# Patient Record
Sex: FEMALE | Race: WHITE | NOT HISPANIC OR LATINO | Employment: OTHER | URBAN - METROPOLITAN AREA
[De-identification: names, ages, dates, MRNs, and addresses within clinical notes are randomized per-mention and may not be internally consistent; named-entity substitution may affect disease eponyms.]

---

## 2017-01-05 ENCOUNTER — COMMUNICATION - HEALTHEAST (OUTPATIENT)
Dept: SLEEP MEDICINE | Facility: CLINIC | Age: 67
End: 2017-01-05

## 2017-01-31 ENCOUNTER — COMMUNICATION - HEALTHEAST (OUTPATIENT)
Dept: INTERNAL MEDICINE | Facility: CLINIC | Age: 67
End: 2017-01-31

## 2017-02-15 ENCOUNTER — RECORDS - HEALTHEAST (OUTPATIENT)
Dept: ADMINISTRATIVE | Facility: OTHER | Age: 67
End: 2017-02-15

## 2017-03-29 ENCOUNTER — COMMUNICATION - HEALTHEAST (OUTPATIENT)
Dept: INTERNAL MEDICINE | Facility: CLINIC | Age: 67
End: 2017-03-29

## 2017-04-06 ENCOUNTER — OFFICE VISIT - HEALTHEAST (OUTPATIENT)
Dept: INTERNAL MEDICINE | Facility: CLINIC | Age: 67
End: 2017-04-06

## 2017-04-06 DIAGNOSIS — I67.89 ACUTE, BUT ILL-DEFINED, CEREBROVASCULAR DISEASE: ICD-10-CM

## 2017-04-06 DIAGNOSIS — E78.5 HYPERLIPIDEMIA: ICD-10-CM

## 2017-04-06 DIAGNOSIS — Z01.818 PRE-OP EXAMINATION: ICD-10-CM

## 2017-04-06 DIAGNOSIS — G56.00 CARPAL TUNNEL SYNDROME: ICD-10-CM

## 2017-04-06 DIAGNOSIS — I10 ESSENTIAL HYPERTENSION: ICD-10-CM

## 2017-04-06 DIAGNOSIS — G47.33 OBSTRUCTIVE SLEEP APNEA (ADULT) (PEDIATRIC): ICD-10-CM

## 2017-04-06 ASSESSMENT — MIFFLIN-ST. JEOR: SCORE: 1340.86

## 2017-04-10 LAB
ATRIAL RATE - MUSE: 66 BPM
DIASTOLIC BLOOD PRESSURE - MUSE: NORMAL MMHG
INTERPRETATION ECG - MUSE: NORMAL
P AXIS - MUSE: 32 DEGREES
PR INTERVAL - MUSE: 152 MS
QRS DURATION - MUSE: 90 MS
QT - MUSE: 390 MS
QTC - MUSE: 408 MS
R AXIS - MUSE: 10 DEGREES
SYSTOLIC BLOOD PRESSURE - MUSE: NORMAL MMHG
T AXIS - MUSE: 15 DEGREES
VENTRICULAR RATE- MUSE: 66 BPM

## 2017-04-11 ENCOUNTER — RECORDS - HEALTHEAST (OUTPATIENT)
Dept: ADMINISTRATIVE | Facility: OTHER | Age: 67
End: 2017-04-11

## 2017-04-18 ENCOUNTER — ANESTHESIA - HEALTHEAST (OUTPATIENT)
Dept: SURGERY | Facility: CLINIC | Age: 67
End: 2017-04-18

## 2017-04-19 ENCOUNTER — SURGERY - HEALTHEAST (OUTPATIENT)
Dept: SURGERY | Facility: CLINIC | Age: 67
End: 2017-04-19

## 2017-04-19 ASSESSMENT — MIFFLIN-ST. JEOR: SCORE: 1341.42

## 2017-04-24 ENCOUNTER — COMMUNICATION - HEALTHEAST (OUTPATIENT)
Dept: INTERNAL MEDICINE | Facility: CLINIC | Age: 67
End: 2017-04-24

## 2017-04-24 DIAGNOSIS — I10 UNSPECIFIED ESSENTIAL HYPERTENSION: ICD-10-CM

## 2017-04-26 ENCOUNTER — RECORDS - HEALTHEAST (OUTPATIENT)
Dept: ADMINISTRATIVE | Facility: OTHER | Age: 67
End: 2017-04-26

## 2017-05-24 ENCOUNTER — RECORDS - HEALTHEAST (OUTPATIENT)
Dept: ADMINISTRATIVE | Facility: OTHER | Age: 67
End: 2017-05-24

## 2017-06-24 ENCOUNTER — COMMUNICATION - HEALTHEAST (OUTPATIENT)
Dept: INTERNAL MEDICINE | Facility: CLINIC | Age: 67
End: 2017-06-24

## 2017-06-24 DIAGNOSIS — G47.00 INSOMNIA: ICD-10-CM

## 2017-07-13 ENCOUNTER — RECORDS - HEALTHEAST (OUTPATIENT)
Dept: ADMINISTRATIVE | Facility: OTHER | Age: 67
End: 2017-07-13

## 2017-07-14 ENCOUNTER — COMMUNICATION - HEALTHEAST (OUTPATIENT)
Dept: INTERNAL MEDICINE | Facility: CLINIC | Age: 67
End: 2017-07-14

## 2017-07-14 DIAGNOSIS — G47.00 INSOMNIA: ICD-10-CM

## 2017-07-14 DIAGNOSIS — E78.5 HYPERLIPIDEMIA: ICD-10-CM

## 2017-08-14 ENCOUNTER — COMMUNICATION - HEALTHEAST (OUTPATIENT)
Dept: SCHEDULING | Facility: CLINIC | Age: 67
End: 2017-08-14

## 2017-08-14 ENCOUNTER — OFFICE VISIT - HEALTHEAST (OUTPATIENT)
Dept: INTERNAL MEDICINE | Facility: CLINIC | Age: 67
End: 2017-08-14

## 2017-08-14 DIAGNOSIS — K59.00 CONSTIPATION: ICD-10-CM

## 2017-08-14 ASSESSMENT — MIFFLIN-ST. JEOR: SCORE: 1348.23

## 2017-10-06 ENCOUNTER — RECORDS - HEALTHEAST (OUTPATIENT)
Dept: ADMINISTRATIVE | Facility: OTHER | Age: 67
End: 2017-10-06

## 2017-10-18 ENCOUNTER — OFFICE VISIT - HEALTHEAST (OUTPATIENT)
Dept: INTERNAL MEDICINE | Facility: CLINIC | Age: 67
End: 2017-10-18

## 2017-10-18 DIAGNOSIS — M21.612 BUNION OF LEFT FOOT: ICD-10-CM

## 2017-10-18 DIAGNOSIS — Z80.41 FAMILY HISTORY OF OVARIAN CANCER: ICD-10-CM

## 2017-10-18 DIAGNOSIS — I67.89 ACUTE, BUT ILL-DEFINED, CEREBROVASCULAR DISEASE: ICD-10-CM

## 2017-10-18 DIAGNOSIS — Z78.0 MENOPAUSE: ICD-10-CM

## 2017-10-18 DIAGNOSIS — I10 ESSENTIAL HYPERTENSION: ICD-10-CM

## 2017-10-18 DIAGNOSIS — Z00.00 ROUTINE GENERAL MEDICAL EXAMINATION AT A HEALTH CARE FACILITY: ICD-10-CM

## 2017-10-18 DIAGNOSIS — G47.33 OBSTRUCTIVE SLEEP APNEA: ICD-10-CM

## 2017-10-18 DIAGNOSIS — Z12.31 SCREENING MAMMOGRAM, ENCOUNTER FOR: ICD-10-CM

## 2017-10-18 DIAGNOSIS — E78.5 HYPERLIPIDEMIA: ICD-10-CM

## 2017-10-18 LAB
CHOLEST SERPL-MCNC: 170 MG/DL
FASTING STATUS PATIENT QL REPORTED: YES
HDLC SERPL-MCNC: 83 MG/DL
LDLC SERPL CALC-MCNC: 78 MG/DL
TRIGL SERPL-MCNC: 46 MG/DL

## 2017-10-18 ASSESSMENT — MIFFLIN-ST. JEOR: SCORE: 1330.08

## 2017-10-19 ENCOUNTER — HOSPITAL ENCOUNTER (OUTPATIENT)
Dept: ULTRASOUND IMAGING | Facility: HOSPITAL | Age: 67
Discharge: HOME | End: 2017-10-19
Attending: INTERNAL MEDICINE

## 2017-10-19 DIAGNOSIS — Z80.41 FAMILY HISTORY OF OVARIAN CANCER: ICD-10-CM

## 2017-11-02 ENCOUNTER — RECORDS - HEALTHEAST (OUTPATIENT)
Dept: ADMINISTRATIVE | Facility: OTHER | Age: 67
End: 2017-11-02

## 2017-11-06 ENCOUNTER — HOSPITAL ENCOUNTER (OUTPATIENT)
Dept: MAMMOGRAPHY | Facility: CLINIC | Age: 67
Discharge: HOME | End: 2017-11-06
Attending: INTERNAL MEDICINE

## 2017-11-06 DIAGNOSIS — Z12.31 SCREENING MAMMOGRAM, ENCOUNTER FOR: ICD-10-CM

## 2017-11-09 ENCOUNTER — COMMUNICATION - HEALTHEAST (OUTPATIENT)
Dept: INTERNAL MEDICINE | Facility: CLINIC | Age: 67
End: 2017-11-09

## 2017-11-09 DIAGNOSIS — I10 ESSENTIAL HYPERTENSION: ICD-10-CM

## 2017-11-20 ENCOUNTER — RECORDS - HEALTHEAST (OUTPATIENT)
Dept: BONE DENSITY | Facility: CLINIC | Age: 67
End: 2017-11-20

## 2017-11-20 ENCOUNTER — RECORDS - HEALTHEAST (OUTPATIENT)
Dept: ADMINISTRATIVE | Facility: OTHER | Age: 67
End: 2017-11-20

## 2017-11-20 DIAGNOSIS — Z78.0 ASYMPTOMATIC MENOPAUSAL STATE: ICD-10-CM

## 2017-11-29 ENCOUNTER — OFFICE VISIT - HEALTHEAST (OUTPATIENT)
Dept: PODIATRY | Facility: CLINIC | Age: 67
End: 2017-11-29

## 2017-11-29 DIAGNOSIS — M20.5X2 HALLUX LIMITUS OF LEFT FOOT: ICD-10-CM

## 2017-12-14 ENCOUNTER — COMMUNICATION - HEALTHEAST (OUTPATIENT)
Dept: INTERNAL MEDICINE | Facility: CLINIC | Age: 67
End: 2017-12-14

## 2017-12-14 DIAGNOSIS — G47.00 INSOMNIA: ICD-10-CM

## 2018-02-19 ENCOUNTER — COMMUNICATION - HEALTHEAST (OUTPATIENT)
Dept: ADMINISTRATIVE | Facility: CLINIC | Age: 68
End: 2018-02-19

## 2018-04-03 ENCOUNTER — COMMUNICATION - HEALTHEAST (OUTPATIENT)
Dept: SLEEP MEDICINE | Facility: CLINIC | Age: 68
End: 2018-04-03

## 2018-04-03 ENCOUNTER — OFFICE VISIT - HEALTHEAST (OUTPATIENT)
Dept: INTERNAL MEDICINE | Facility: CLINIC | Age: 68
End: 2018-04-03

## 2018-04-03 DIAGNOSIS — M20.5X2 HALLUX LIMITUS OF LEFT FOOT: ICD-10-CM

## 2018-04-03 DIAGNOSIS — G47.33 OBSTRUCTIVE SLEEP APNEA: ICD-10-CM

## 2018-04-03 DIAGNOSIS — G47.00 INSOMNIA, UNSPECIFIED TYPE: ICD-10-CM

## 2018-04-03 DIAGNOSIS — I67.89 ACUTE, BUT ILL-DEFINED, CEREBROVASCULAR DISEASE: ICD-10-CM

## 2018-04-03 DIAGNOSIS — I10 ESSENTIAL HYPERTENSION: ICD-10-CM

## 2018-04-03 DIAGNOSIS — Z01.810 PREOP CARDIOVASCULAR EXAM: ICD-10-CM

## 2018-04-03 LAB
ANION GAP SERPL CALCULATED.3IONS-SCNC: 12 MMOL/L (ref 5–18)
BUN SERPL-MCNC: 15 MG/DL (ref 8–22)
CALCIUM SERPL-MCNC: 9.5 MG/DL (ref 8.5–10.5)
CHLORIDE BLD-SCNC: 100 MMOL/L (ref 98–107)
CO2 SERPL-SCNC: 26 MMOL/L (ref 22–31)
CREAT SERPL-MCNC: 0.64 MG/DL (ref 0.6–1.1)
GFR SERPL CREATININE-BSD FRML MDRD: >60 ML/MIN/1.73M2
GLUCOSE BLD-MCNC: 87 MG/DL (ref 70–125)
POTASSIUM BLD-SCNC: 4.2 MMOL/L (ref 3.5–5)
SODIUM SERPL-SCNC: 138 MMOL/L (ref 136–145)

## 2018-04-03 ASSESSMENT — MIFFLIN-ST. JEOR: SCORE: 1315.34

## 2018-04-04 ENCOUNTER — COMMUNICATION - HEALTHEAST (OUTPATIENT)
Dept: PODIATRY | Facility: CLINIC | Age: 68
End: 2018-04-04

## 2018-04-04 LAB
ATRIAL RATE - MUSE: 60 BPM
DIASTOLIC BLOOD PRESSURE - MUSE: NORMAL MMHG
INTERPRETATION ECG - MUSE: NORMAL
P AXIS - MUSE: 33 DEGREES
PR INTERVAL - MUSE: 156 MS
QRS DURATION - MUSE: 80 MS
QT - MUSE: 414 MS
QTC - MUSE: 414 MS
R AXIS - MUSE: 50 DEGREES
SYSTOLIC BLOOD PRESSURE - MUSE: NORMAL MMHG
T AXIS - MUSE: 43 DEGREES
VENTRICULAR RATE- MUSE: 60 BPM

## 2018-04-11 ENCOUNTER — OFFICE VISIT - HEALTHEAST (OUTPATIENT)
Dept: SLEEP MEDICINE | Facility: CLINIC | Age: 68
End: 2018-04-11

## 2018-04-11 DIAGNOSIS — G47.00 PERSISTENT INSOMNIA: ICD-10-CM

## 2018-04-11 DIAGNOSIS — G47.33 OSA ON CPAP: ICD-10-CM

## 2018-04-11 ASSESSMENT — MIFFLIN-ST. JEOR: SCORE: 1305.7

## 2018-04-19 ENCOUNTER — COMMUNICATION - HEALTHEAST (OUTPATIENT)
Dept: ADMINISTRATIVE | Facility: CLINIC | Age: 68
End: 2018-04-19

## 2018-04-20 ENCOUNTER — SURGERY - HEALTHEAST (OUTPATIENT)
Dept: SURGERY | Facility: CLINIC | Age: 68
End: 2018-04-20

## 2018-04-20 ENCOUNTER — ANESTHESIA - HEALTHEAST (OUTPATIENT)
Dept: SURGERY | Facility: CLINIC | Age: 68
End: 2018-04-20

## 2018-04-20 ASSESSMENT — MIFFLIN-ST. JEOR: SCORE: 1289.71

## 2018-04-23 ENCOUNTER — COMMUNICATION - HEALTHEAST (OUTPATIENT)
Dept: VASCULAR SURGERY | Facility: CLINIC | Age: 68
End: 2018-04-23

## 2018-04-25 ENCOUNTER — OFFICE VISIT - HEALTHEAST (OUTPATIENT)
Dept: PODIATRY | Facility: CLINIC | Age: 68
End: 2018-04-25

## 2018-04-25 DIAGNOSIS — M20.5X2 HALLUX LIMITUS, LEFT: ICD-10-CM

## 2018-05-02 ENCOUNTER — OFFICE VISIT - HEALTHEAST (OUTPATIENT)
Dept: PODIATRY | Facility: CLINIC | Age: 68
End: 2018-05-02

## 2018-05-02 DIAGNOSIS — M20.5X2 HALLUX LIMITUS OF LEFT FOOT: ICD-10-CM

## 2018-05-22 ENCOUNTER — RECORDS - HEALTHEAST (OUTPATIENT)
Dept: ADMINISTRATIVE | Facility: OTHER | Age: 68
End: 2018-05-22

## 2018-06-04 ENCOUNTER — COMMUNICATION - HEALTHEAST (OUTPATIENT)
Dept: INTERNAL MEDICINE | Facility: CLINIC | Age: 68
End: 2018-06-04

## 2018-06-04 DIAGNOSIS — G47.00 INSOMNIA: ICD-10-CM

## 2018-06-04 DIAGNOSIS — I10 ESSENTIAL HYPERTENSION: ICD-10-CM

## 2018-07-03 ENCOUNTER — COMMUNICATION - HEALTHEAST (OUTPATIENT)
Dept: INTERNAL MEDICINE | Facility: CLINIC | Age: 68
End: 2018-07-03

## 2018-07-03 DIAGNOSIS — G47.00 INSOMNIA: ICD-10-CM

## 2018-09-12 ENCOUNTER — COMMUNICATION - HEALTHEAST (OUTPATIENT)
Dept: INTERNAL MEDICINE | Facility: CLINIC | Age: 68
End: 2018-09-12

## 2018-09-12 DIAGNOSIS — G47.00 INSOMNIA: ICD-10-CM

## 2018-09-17 ENCOUNTER — COMMUNICATION - HEALTHEAST (OUTPATIENT)
Dept: INTERNAL MEDICINE | Facility: CLINIC | Age: 68
End: 2018-09-17

## 2018-09-17 DIAGNOSIS — E78.5 HYPERLIPIDEMIA: ICD-10-CM

## 2018-09-17 DIAGNOSIS — I10 ESSENTIAL HYPERTENSION: ICD-10-CM

## 2018-09-17 DIAGNOSIS — I67.89 ACUTE, BUT ILL-DEFINED, CEREBROVASCULAR DISEASE: ICD-10-CM

## 2018-10-05 ENCOUNTER — OFFICE VISIT - HEALTHEAST (OUTPATIENT)
Dept: FAMILY MEDICINE | Facility: CLINIC | Age: 68
End: 2018-10-05

## 2018-10-05 ENCOUNTER — COMMUNICATION - HEALTHEAST (OUTPATIENT)
Dept: SCHEDULING | Facility: CLINIC | Age: 68
End: 2018-10-05

## 2018-10-05 DIAGNOSIS — L50.9 URTICARIA: ICD-10-CM

## 2018-10-22 ENCOUNTER — COMMUNICATION - HEALTHEAST (OUTPATIENT)
Dept: ADMINISTRATIVE | Facility: CLINIC | Age: 68
End: 2018-10-22

## 2018-10-22 ENCOUNTER — AMBULATORY - HEALTHEAST (OUTPATIENT)
Dept: PODIATRY | Facility: CLINIC | Age: 68
End: 2018-10-22

## 2018-10-23 ENCOUNTER — OFFICE VISIT - HEALTHEAST (OUTPATIENT)
Dept: INTERNAL MEDICINE | Facility: CLINIC | Age: 68
End: 2018-10-23

## 2018-10-23 DIAGNOSIS — G47.00 INSOMNIA: ICD-10-CM

## 2018-10-23 DIAGNOSIS — I67.89 ACUTE, BUT ILL-DEFINED, CEREBROVASCULAR DISEASE: ICD-10-CM

## 2018-10-23 DIAGNOSIS — E78.5 HYPERLIPIDEMIA: ICD-10-CM

## 2018-10-23 DIAGNOSIS — Z80.41 FAMILY HISTORY OF OVARIAN CANCER: ICD-10-CM

## 2018-10-23 DIAGNOSIS — I10 ESSENTIAL HYPERTENSION: ICD-10-CM

## 2018-10-23 DIAGNOSIS — Z00.00 ROUTINE GENERAL MEDICAL EXAMINATION AT A HEALTH CARE FACILITY: ICD-10-CM

## 2018-10-23 LAB
ALBUMIN SERPL-MCNC: 4.3 G/DL (ref 3.5–5)
ALP SERPL-CCNC: 75 U/L (ref 45–120)
ALT SERPL W P-5'-P-CCNC: 44 U/L (ref 0–45)
ANION GAP SERPL CALCULATED.3IONS-SCNC: 13 MMOL/L (ref 5–18)
AST SERPL W P-5'-P-CCNC: 31 U/L (ref 0–40)
BILIRUB SERPL-MCNC: 0.8 MG/DL (ref 0–1)
BUN SERPL-MCNC: 13 MG/DL (ref 8–22)
CALCIUM SERPL-MCNC: 10.1 MG/DL (ref 8.5–10.5)
CANCER AG125 SERPL-ACNC: 11.8 U/ML (ref 0–35)
CHLORIDE BLD-SCNC: 100 MMOL/L (ref 98–107)
CHOLEST SERPL-MCNC: 178 MG/DL
CO2 SERPL-SCNC: 27 MMOL/L (ref 22–31)
CREAT SERPL-MCNC: 0.63 MG/DL (ref 0.6–1.1)
FASTING STATUS PATIENT QL REPORTED: YES
GFR SERPL CREATININE-BSD FRML MDRD: >60 ML/MIN/1.73M2
GLUCOSE BLD-MCNC: 90 MG/DL (ref 70–125)
HDLC SERPL-MCNC: 79 MG/DL
LDLC SERPL CALC-MCNC: 85 MG/DL
POTASSIUM BLD-SCNC: 4 MMOL/L (ref 3.5–5)
PROT SERPL-MCNC: 7.1 G/DL (ref 6–8)
SODIUM SERPL-SCNC: 140 MMOL/L (ref 136–145)
TRIGL SERPL-MCNC: 70 MG/DL

## 2018-10-23 ASSESSMENT — MIFFLIN-ST. JEOR: SCORE: 1295.61

## 2018-10-24 ENCOUNTER — COMMUNICATION - HEALTHEAST (OUTPATIENT)
Dept: INTERNAL MEDICINE | Facility: CLINIC | Age: 68
End: 2018-10-24

## 2018-11-13 ENCOUNTER — HOSPITAL ENCOUNTER (OUTPATIENT)
Dept: ULTRASOUND IMAGING | Facility: CLINIC | Age: 68
Discharge: HOME | End: 2018-11-13
Attending: INTERNAL MEDICINE

## 2018-11-13 ENCOUNTER — HOSPITAL ENCOUNTER (OUTPATIENT)
Dept: MAMMOGRAPHY | Facility: CLINIC | Age: 68
Discharge: HOME | End: 2018-11-13
Attending: OBSTETRICS & GYNECOLOGY

## 2018-11-13 DIAGNOSIS — Z80.41 FAMILY HISTORY OF OVARIAN CANCER: ICD-10-CM

## 2018-11-13 DIAGNOSIS — Z12.31 VISIT FOR SCREENING MAMMOGRAM: ICD-10-CM

## 2018-11-30 ENCOUNTER — OFFICE VISIT - HEALTHEAST (OUTPATIENT)
Dept: FAMILY MEDICINE | Facility: CLINIC | Age: 68
End: 2018-11-30

## 2018-11-30 ENCOUNTER — COMMUNICATION - HEALTHEAST (OUTPATIENT)
Dept: INTERNAL MEDICINE | Facility: CLINIC | Age: 68
End: 2018-11-30

## 2018-11-30 DIAGNOSIS — K59.01 SLOW TRANSIT CONSTIPATION: ICD-10-CM

## 2018-11-30 DIAGNOSIS — R10.32 ABDOMINAL PAIN, LEFT LOWER QUADRANT: ICD-10-CM

## 2018-12-03 ENCOUNTER — COMMUNICATION - HEALTHEAST (OUTPATIENT)
Dept: INTERNAL MEDICINE | Facility: CLINIC | Age: 68
End: 2018-12-03

## 2018-12-27 ENCOUNTER — OFFICE VISIT - HEALTHEAST (OUTPATIENT)
Dept: FAMILY MEDICINE | Facility: CLINIC | Age: 68
End: 2018-12-27

## 2018-12-27 ENCOUNTER — COMMUNICATION - HEALTHEAST (OUTPATIENT)
Dept: INTERNAL MEDICINE | Facility: CLINIC | Age: 68
End: 2018-12-27

## 2018-12-27 DIAGNOSIS — R10.11 ABDOMINAL PAIN, RIGHT UPPER QUADRANT: ICD-10-CM

## 2018-12-27 DIAGNOSIS — R11.0 NAUSEA: ICD-10-CM

## 2018-12-27 DIAGNOSIS — K59.01 SLOW TRANSIT CONSTIPATION: ICD-10-CM

## 2018-12-27 DIAGNOSIS — R10.32 LEFT LOWER QUADRANT PAIN: ICD-10-CM

## 2018-12-27 LAB
ALBUMIN UR-MCNC: NEGATIVE MG/DL
ANION GAP SERPL CALCULATED.3IONS-SCNC: 10 MMOL/L (ref 5–18)
APPEARANCE UR: CLEAR
BASOPHILS # BLD AUTO: 0 THOU/UL (ref 0–0.2)
BASOPHILS NFR BLD AUTO: 1 % (ref 0–2)
BILIRUB UR QL STRIP: NEGATIVE
BUN SERPL-MCNC: 15 MG/DL (ref 8–22)
C REACTIVE PROTEIN LHE: 0.2 MG/DL (ref 0–0.8)
CHLORIDE BLD-SCNC: 103 MMOL/L (ref 98–107)
CO2 SERPL-SCNC: 27 MMOL/L (ref 22–31)
COLOR UR AUTO: YELLOW
CREAT SERPL-MCNC: 0.6 MG/DL (ref 0.7–1.3)
EOSINOPHIL # BLD AUTO: 0.2 THOU/UL (ref 0–0.4)
EOSINOPHIL NFR BLD AUTO: 3 % (ref 0–6)
ERYTHROCYTE [DISTWIDTH] IN BLOOD BY AUTOMATED COUNT: 11.6 % (ref 11–14.5)
GLUCOSE BLD-MCNC: 87 MG/DL (ref 70–125)
GLUCOSE UR STRIP-MCNC: NEGATIVE MG/DL
HCT VFR BLD AUTO: 37.6 % (ref 35–47)
HGB BLD-MCNC: 13.3 G/DL (ref 12–16)
HGB UR QL STRIP: NEGATIVE
KETONES UR STRIP-MCNC: NEGATIVE MG/DL
LEUKOCYTE ESTERASE UR QL STRIP: NEGATIVE
LYMPHOCYTES # BLD AUTO: 2.4 THOU/UL (ref 0.8–4.4)
LYMPHOCYTES NFR BLD AUTO: 35 % (ref 20–40)
MCH RBC QN AUTO: 32.4 PG (ref 27–34)
MCHC RBC AUTO-ENTMCNC: 35.5 G/DL (ref 32–36)
MCV RBC AUTO: 91 FL (ref 80–100)
MONOCYTES # BLD AUTO: 0.5 THOU/UL (ref 0–0.9)
MONOCYTES NFR BLD AUTO: 7 % (ref 2–10)
NEUTROPHILS # BLD AUTO: 3.9 THOU/UL (ref 2–7.7)
NEUTROPHILS NFR BLD AUTO: 55 % (ref 50–70)
NITRATE UR QL: NEGATIVE
PH UR STRIP: 5 [PH] (ref 5–8)
PLATELET # BLD AUTO: 243 THOU/UL (ref 140–440)
PMV BLD AUTO: 7.6 FL (ref 7–10)
POTASSIUM BLD-SCNC: 3.8 MMOL/L (ref 3.5–5.5)
RBC # BLD AUTO: 4.12 MILL/UL (ref 3.8–5.4)
SODIUM SERPL-SCNC: 140 MMOL/L (ref 136–145)
SP GR UR STRIP: 1.01 (ref 1–1.03)
UROBILINOGEN UR STRIP-ACNC: NORMAL
WBC: 7 THOU/UL (ref 4–11)

## 2018-12-31 ENCOUNTER — HOSPITAL ENCOUNTER (OUTPATIENT)
Dept: CT IMAGING | Facility: CLINIC | Age: 68
Discharge: HOME | End: 2018-12-31
Attending: INTERNAL MEDICINE

## 2018-12-31 DIAGNOSIS — R10.11 ABDOMINAL PAIN, RIGHT UPPER QUADRANT: ICD-10-CM

## 2018-12-31 LAB
CREAT BLD-MCNC: 0.6 MG/DL
POC GFR AMER AF HE - HISTORICAL: >60 ML/MIN/1.73M2
POC GFR NON AMER AF HE - HISTORICAL: >60 ML/MIN/1.73M2

## 2019-01-17 ENCOUNTER — OFFICE VISIT - HEALTHEAST (OUTPATIENT)
Dept: INTERNAL MEDICINE | Facility: CLINIC | Age: 69
End: 2019-01-17

## 2019-01-17 DIAGNOSIS — Z12.11 SPECIAL SCREENING FOR MALIGNANT NEOPLASMS, COLON: ICD-10-CM

## 2019-01-17 DIAGNOSIS — R06.2 WHEEZING: ICD-10-CM

## 2019-01-17 DIAGNOSIS — R10.32 ABDOMINAL PAIN, LEFT LOWER QUADRANT: ICD-10-CM

## 2019-04-06 ENCOUNTER — COMMUNICATION - HEALTHEAST (OUTPATIENT)
Dept: INTERNAL MEDICINE | Facility: CLINIC | Age: 69
End: 2019-04-06

## 2019-04-06 DIAGNOSIS — G47.00 INSOMNIA: ICD-10-CM

## 2019-04-10 ENCOUNTER — COMMUNICATION - HEALTHEAST (OUTPATIENT)
Dept: INTERNAL MEDICINE | Facility: CLINIC | Age: 69
End: 2019-04-10

## 2019-04-10 DIAGNOSIS — G47.00 INSOMNIA: ICD-10-CM

## 2019-04-18 ENCOUNTER — COMMUNICATION - HEALTHEAST (OUTPATIENT)
Dept: SLEEP MEDICINE | Facility: CLINIC | Age: 69
End: 2019-04-18

## 2019-04-18 ENCOUNTER — OFFICE VISIT - HEALTHEAST (OUTPATIENT)
Dept: SLEEP MEDICINE | Facility: CLINIC | Age: 69
End: 2019-04-18

## 2019-04-18 DIAGNOSIS — G47.33 OBSTRUCTIVE SLEEP APNEA: ICD-10-CM

## 2019-04-18 ASSESSMENT — MIFFLIN-ST. JEOR: SCORE: 1323.28

## 2019-04-19 ENCOUNTER — AMBULATORY - HEALTHEAST (OUTPATIENT)
Dept: SLEEP MEDICINE | Facility: CLINIC | Age: 69
End: 2019-04-19

## 2019-04-30 ENCOUNTER — RECORDS - HEALTHEAST (OUTPATIENT)
Dept: ADMINISTRATIVE | Facility: OTHER | Age: 69
End: 2019-04-30

## 2019-05-02 ENCOUNTER — SURGERY - HEALTHEAST (OUTPATIENT)
Dept: SURGERY | Facility: CLINIC | Age: 69
End: 2019-05-02

## 2019-05-02 ENCOUNTER — ANESTHESIA - HEALTHEAST (OUTPATIENT)
Dept: SURGERY | Facility: CLINIC | Age: 69
End: 2019-05-02

## 2019-05-03 ENCOUNTER — AMBULATORY - HEALTHEAST (OUTPATIENT)
Dept: SURGERY | Facility: CLINIC | Age: 69
End: 2019-05-03

## 2019-05-03 DIAGNOSIS — K46.0 INCARCERATED HERNIA: ICD-10-CM

## 2019-05-03 ASSESSMENT — MIFFLIN-ST. JEOR: SCORE: 1303.77

## 2019-05-06 ENCOUNTER — RECORDS - HEALTHEAST (OUTPATIENT)
Dept: ADMINISTRATIVE | Facility: OTHER | Age: 69
End: 2019-05-06

## 2019-05-17 ENCOUNTER — COMMUNICATION - HEALTHEAST (OUTPATIENT)
Dept: INTERNAL MEDICINE | Facility: CLINIC | Age: 69
End: 2019-05-17

## 2019-05-17 ENCOUNTER — OFFICE VISIT - HEALTHEAST (OUTPATIENT)
Dept: SURGERY | Facility: CLINIC | Age: 69
End: 2019-05-17

## 2019-05-17 DIAGNOSIS — Z98.890 POST-OPERATIVE STATE: ICD-10-CM

## 2019-05-17 DIAGNOSIS — G47.00 INSOMNIA: ICD-10-CM

## 2019-05-20 ENCOUNTER — COMMUNICATION - HEALTHEAST (OUTPATIENT)
Dept: INTERNAL MEDICINE | Facility: CLINIC | Age: 69
End: 2019-05-20

## 2019-09-19 ENCOUNTER — OFFICE VISIT - HEALTHEAST (OUTPATIENT)
Dept: FAMILY MEDICINE | Facility: CLINIC | Age: 69
End: 2019-09-19

## 2019-09-19 DIAGNOSIS — Z87.19 HISTORY OF ABDOMINAL HERNIA: ICD-10-CM

## 2019-09-19 DIAGNOSIS — R10.32 ABDOMINAL PAIN, LEFT LOWER QUADRANT: ICD-10-CM

## 2019-10-31 ENCOUNTER — OFFICE VISIT - HEALTHEAST (OUTPATIENT)
Dept: INTERNAL MEDICINE | Facility: CLINIC | Age: 69
End: 2019-10-31

## 2019-10-31 DIAGNOSIS — Z00.00 ROUTINE GENERAL MEDICAL EXAMINATION AT A HEALTH CARE FACILITY: ICD-10-CM

## 2019-10-31 DIAGNOSIS — Z78.0 MENOPAUSE: ICD-10-CM

## 2019-10-31 DIAGNOSIS — E78.5 HYPERLIPIDEMIA: ICD-10-CM

## 2019-10-31 DIAGNOSIS — R06.2 WHEEZING: ICD-10-CM

## 2019-10-31 DIAGNOSIS — G47.00 INSOMNIA: ICD-10-CM

## 2019-10-31 DIAGNOSIS — I10 ESSENTIAL HYPERTENSION: ICD-10-CM

## 2019-10-31 DIAGNOSIS — Z80.41 FAMILY HISTORY OF MALIGNANT NEOPLASM OF OVARY: ICD-10-CM

## 2019-10-31 DIAGNOSIS — I67.89 ACUTE, BUT ILL-DEFINED, CEREBROVASCULAR DISEASE: ICD-10-CM

## 2019-10-31 DIAGNOSIS — Z00.01 ENCOUNTER FOR GENERAL ADULT MEDICAL EXAMINATION WITH ABNORMAL FINDINGS: ICD-10-CM

## 2019-10-31 LAB
ALBUMIN SERPL-MCNC: 4.3 G/DL (ref 3.5–5)
ALP SERPL-CCNC: 65 U/L (ref 45–120)
ALT SERPL W P-5'-P-CCNC: 33 U/L (ref 0–45)
ANION GAP SERPL CALCULATED.3IONS-SCNC: 14 MMOL/L (ref 5–18)
AST SERPL W P-5'-P-CCNC: 27 U/L (ref 0–40)
BILIRUB SERPL-MCNC: 0.7 MG/DL (ref 0–1)
BUN SERPL-MCNC: 13 MG/DL (ref 8–22)
CALCIUM SERPL-MCNC: 10 MG/DL (ref 8.5–10.5)
CHLORIDE BLD-SCNC: 103 MMOL/L (ref 98–107)
CHOLEST SERPL-MCNC: 170 MG/DL
CO2 SERPL-SCNC: 24 MMOL/L (ref 22–31)
CREAT SERPL-MCNC: 0.64 MG/DL (ref 0.6–1.1)
FASTING STATUS PATIENT QL REPORTED: YES
GFR SERPL CREATININE-BSD FRML MDRD: >60 ML/MIN/1.73M2
GLUCOSE BLD-MCNC: 92 MG/DL (ref 70–125)
HDLC SERPL-MCNC: 99 MG/DL
LDLC SERPL CALC-MCNC: 60 MG/DL
POTASSIUM BLD-SCNC: 4.2 MMOL/L (ref 3.5–5)
PROT SERPL-MCNC: 7.1 G/DL (ref 6–8)
SODIUM SERPL-SCNC: 141 MMOL/L (ref 136–145)
TRIGL SERPL-MCNC: 57 MG/DL

## 2019-10-31 ASSESSMENT — MIFFLIN-ST. JEOR: SCORE: 1339.15

## 2019-11-01 LAB
25(OH)D3 SERPL-MCNC: 46.6 NG/ML (ref 30–80)
25(OH)D3 SERPL-MCNC: 46.6 NG/ML (ref 30–80)
HCV AB SERPL QL IA: NEGATIVE

## 2019-12-06 ENCOUNTER — OFFICE VISIT - HEALTHEAST (OUTPATIENT)
Dept: FAMILY MEDICINE | Facility: CLINIC | Age: 69
End: 2019-12-06

## 2019-12-06 ENCOUNTER — RECORDS - HEALTHEAST (OUTPATIENT)
Dept: ADMINISTRATIVE | Facility: OTHER | Age: 69
End: 2019-12-06

## 2019-12-06 DIAGNOSIS — J20.9 ACUTE BRONCHITIS, UNSPECIFIED ORGANISM: ICD-10-CM

## 2019-12-06 LAB — PAP SMEAR - HIM PATIENT REPORTED: NORMAL

## 2019-12-12 ENCOUNTER — COMMUNICATION - HEALTHEAST (OUTPATIENT)
Dept: SCHEDULING | Facility: CLINIC | Age: 69
End: 2019-12-12

## 2019-12-13 ENCOUNTER — OFFICE VISIT - HEALTHEAST (OUTPATIENT)
Dept: FAMILY MEDICINE | Facility: CLINIC | Age: 69
End: 2019-12-13

## 2019-12-13 DIAGNOSIS — J20.9 ACUTE BRONCHITIS WITH SYMPTOMS > 10 DAYS: ICD-10-CM

## 2019-12-19 ENCOUNTER — OFFICE VISIT - HEALTHEAST (OUTPATIENT)
Dept: FAMILY MEDICINE | Facility: CLINIC | Age: 69
End: 2019-12-19

## 2019-12-19 ENCOUNTER — COMMUNICATION - HEALTHEAST (OUTPATIENT)
Dept: SCHEDULING | Facility: CLINIC | Age: 69
End: 2019-12-19

## 2019-12-19 DIAGNOSIS — R06.02 SOB (SHORTNESS OF BREATH): ICD-10-CM

## 2019-12-19 DIAGNOSIS — J01.90 ACUTE NON-RECURRENT SINUSITIS, UNSPECIFIED LOCATION: ICD-10-CM

## 2019-12-19 DIAGNOSIS — J20.9 ACUTE BRONCHITIS, UNSPECIFIED ORGANISM: ICD-10-CM

## 2019-12-19 ASSESSMENT — MIFFLIN-ST. JEOR: SCORE: 1349.93

## 2019-12-20 ENCOUNTER — COMMUNICATION - HEALTHEAST (OUTPATIENT)
Dept: SCHEDULING | Facility: CLINIC | Age: 69
End: 2019-12-20

## 2019-12-23 ENCOUNTER — OFFICE VISIT - HEALTHEAST (OUTPATIENT)
Dept: INTERNAL MEDICINE | Facility: CLINIC | Age: 69
End: 2019-12-23

## 2019-12-23 DIAGNOSIS — Z15.89 MTHFR MUTATION: ICD-10-CM

## 2019-12-23 DIAGNOSIS — J40 BRONCHITIS: ICD-10-CM

## 2019-12-26 ENCOUNTER — RECORDS - HEALTHEAST (OUTPATIENT)
Dept: HEALTH INFORMATION MANAGEMENT | Facility: CLINIC | Age: 69
End: 2019-12-26

## 2019-12-29 ENCOUNTER — COMMUNICATION - HEALTHEAST (OUTPATIENT)
Dept: INTERNAL MEDICINE | Facility: CLINIC | Age: 69
End: 2019-12-29

## 2019-12-29 DIAGNOSIS — G47.00 INSOMNIA: ICD-10-CM

## 2019-12-30 ENCOUNTER — HOSPITAL ENCOUNTER (OUTPATIENT)
Dept: MAMMOGRAPHY | Facility: CLINIC | Age: 69
Discharge: HOME | End: 2019-12-30
Attending: OBSTETRICS & GYNECOLOGY

## 2019-12-30 DIAGNOSIS — Z12.31 VISIT FOR SCREENING MAMMOGRAM: ICD-10-CM

## 2019-12-31 ENCOUNTER — COMMUNICATION - HEALTHEAST (OUTPATIENT)
Dept: INTERNAL MEDICINE | Facility: CLINIC | Age: 69
End: 2019-12-31

## 2020-01-20 ENCOUNTER — COMMUNICATION - HEALTHEAST (OUTPATIENT)
Dept: SURGERY | Facility: CLINIC | Age: 70
End: 2020-01-20

## 2020-01-21 ENCOUNTER — COMMUNICATION - HEALTHEAST (OUTPATIENT)
Dept: INTERNAL MEDICINE | Facility: CLINIC | Age: 70
End: 2020-01-21

## 2020-01-22 ENCOUNTER — COMMUNICATION - HEALTHEAST (OUTPATIENT)
Dept: INTERNAL MEDICINE | Facility: CLINIC | Age: 70
End: 2020-01-22

## 2020-01-27 ENCOUNTER — OFFICE VISIT - HEALTHEAST (OUTPATIENT)
Dept: SURGERY | Facility: CLINIC | Age: 70
End: 2020-01-27

## 2020-01-27 DIAGNOSIS — K40.91 UNILATERAL RECURRENT INGUINAL HERNIA WITHOUT OBSTRUCTION OR GANGRENE: ICD-10-CM

## 2020-01-27 ASSESSMENT — MIFFLIN-ST. JEOR: SCORE: 1348.23

## 2020-01-29 ENCOUNTER — SURGERY - HEALTHEAST (OUTPATIENT)
Dept: SURGERY | Facility: CLINIC | Age: 70
End: 2020-01-29

## 2020-01-29 ENCOUNTER — OFFICE VISIT - HEALTHEAST (OUTPATIENT)
Dept: SURGERY | Facility: CLINIC | Age: 70
End: 2020-01-29

## 2020-01-29 DIAGNOSIS — K43.2 INCISIONAL HERNIA, WITHOUT OBSTRUCTION OR GANGRENE: ICD-10-CM

## 2020-02-03 ENCOUNTER — OFFICE VISIT - HEALTHEAST (OUTPATIENT)
Dept: INTERNAL MEDICINE | Facility: CLINIC | Age: 70
End: 2020-02-03

## 2020-02-03 DIAGNOSIS — K43.2 INCISIONAL HERNIA, WITHOUT OBSTRUCTION OR GANGRENE: ICD-10-CM

## 2020-02-03 DIAGNOSIS — Z01.818 PRE-OP EXAM: ICD-10-CM

## 2020-02-03 LAB
ANION GAP SERPL CALCULATED.3IONS-SCNC: 9 MMOL/L (ref 5–18)
ATRIAL RATE - MUSE: 69 BPM
BUN SERPL-MCNC: 14 MG/DL (ref 8–22)
CALCIUM SERPL-MCNC: 9.7 MG/DL (ref 8.5–10.5)
CHLORIDE BLD-SCNC: 103 MMOL/L (ref 98–107)
CO2 SERPL-SCNC: 28 MMOL/L (ref 22–31)
CREAT SERPL-MCNC: 0.83 MG/DL (ref 0.6–1.1)
DIASTOLIC BLOOD PRESSURE - MUSE: NORMAL
GFR SERPL CREATININE-BSD FRML MDRD: >60 ML/MIN/1.73M2
GLUCOSE BLD-MCNC: 110 MG/DL (ref 70–125)
INTERPRETATION ECG - MUSE: NORMAL
P AXIS - MUSE: 28 DEGREES
POTASSIUM BLD-SCNC: 4.1 MMOL/L (ref 3.5–5)
PR INTERVAL - MUSE: 152 MS
QRS DURATION - MUSE: 88 MS
QT - MUSE: 386 MS
QTC - MUSE: 413 MS
R AXIS - MUSE: 21 DEGREES
SODIUM SERPL-SCNC: 140 MMOL/L (ref 136–145)
SYSTOLIC BLOOD PRESSURE - MUSE: NORMAL
T AXIS - MUSE: 29 DEGREES
VENTRICULAR RATE- MUSE: 69 BPM

## 2020-02-13 ENCOUNTER — SURGERY - HEALTHEAST (OUTPATIENT)
Dept: SURGERY | Facility: HOSPITAL | Age: 70
End: 2020-02-13

## 2020-02-13 ENCOUNTER — ANESTHESIA - HEALTHEAST (OUTPATIENT)
Dept: SURGERY | Facility: HOSPITAL | Age: 70
End: 2020-02-13

## 2020-02-26 ENCOUNTER — OFFICE VISIT - HEALTHEAST (OUTPATIENT)
Dept: SURGERY | Facility: CLINIC | Age: 70
End: 2020-02-26

## 2020-02-26 DIAGNOSIS — Z98.890 POST-OPERATIVE STATE: ICD-10-CM

## 2020-04-07 ENCOUNTER — COMMUNICATION - HEALTHEAST (OUTPATIENT)
Dept: INTERNAL MEDICINE | Facility: CLINIC | Age: 70
End: 2020-04-07

## 2020-04-07 DIAGNOSIS — G47.00 INSOMNIA: ICD-10-CM

## 2020-08-17 ENCOUNTER — COMMUNICATION - HEALTHEAST (OUTPATIENT)
Dept: INTERNAL MEDICINE | Facility: CLINIC | Age: 70
End: 2020-08-17

## 2020-08-17 DIAGNOSIS — G47.00 INSOMNIA: ICD-10-CM

## 2020-09-17 ENCOUNTER — OFFICE VISIT - HEALTHEAST (OUTPATIENT)
Dept: INTERNAL MEDICINE | Facility: CLINIC | Age: 70
End: 2020-09-17

## 2020-09-17 DIAGNOSIS — H26.9 CATARACT OF BOTH EYES, UNSPECIFIED CATARACT TYPE: ICD-10-CM

## 2020-09-17 DIAGNOSIS — Z01.818 PREOP GENERAL PHYSICAL EXAM: ICD-10-CM

## 2020-09-17 DIAGNOSIS — I10 ESSENTIAL HYPERTENSION: ICD-10-CM

## 2020-09-22 ENCOUNTER — RECORDS - HEALTHEAST (OUTPATIENT)
Dept: ADMINISTRATIVE | Facility: OTHER | Age: 70
End: 2020-09-22

## 2020-10-27 ENCOUNTER — COMMUNICATION - HEALTHEAST (OUTPATIENT)
Dept: INTERNAL MEDICINE | Facility: CLINIC | Age: 70
End: 2020-10-27

## 2020-10-27 DIAGNOSIS — G47.00 INSOMNIA: ICD-10-CM

## 2020-11-18 ENCOUNTER — COMMUNICATION - HEALTHEAST (OUTPATIENT)
Dept: INTERNAL MEDICINE | Facility: CLINIC | Age: 70
End: 2020-11-18

## 2020-11-18 DIAGNOSIS — I10 ESSENTIAL HYPERTENSION: ICD-10-CM

## 2020-11-18 DIAGNOSIS — I67.89 ACUTE, BUT ILL-DEFINED, CEREBROVASCULAR DISEASE: ICD-10-CM

## 2020-11-18 DIAGNOSIS — E78.5 HYPERLIPIDEMIA: ICD-10-CM

## 2020-11-23 ENCOUNTER — AMBULATORY - HEALTHEAST (OUTPATIENT)
Dept: LAB | Facility: CLINIC | Age: 70
End: 2020-11-23

## 2020-11-23 ENCOUNTER — OFFICE VISIT - HEALTHEAST (OUTPATIENT)
Dept: INTERNAL MEDICINE | Facility: CLINIC | Age: 70
End: 2020-11-23

## 2020-11-23 DIAGNOSIS — Z80.41 FAMILY HISTORY OF MALIGNANT NEOPLASM OF OVARY: ICD-10-CM

## 2020-11-23 DIAGNOSIS — I67.89 ACUTE, BUT ILL-DEFINED, CEREBROVASCULAR DISEASE: ICD-10-CM

## 2020-11-23 DIAGNOSIS — I10 ESSENTIAL HYPERTENSION: ICD-10-CM

## 2020-11-23 DIAGNOSIS — G47.00 INSOMNIA: ICD-10-CM

## 2020-11-23 DIAGNOSIS — R06.2 WHEEZING: ICD-10-CM

## 2020-11-23 DIAGNOSIS — G47.33 OBSTRUCTIVE SLEEP APNEA: ICD-10-CM

## 2020-11-23 DIAGNOSIS — Z78.0 ASYMPTOMATIC MENOPAUSAL STATE: ICD-10-CM

## 2020-11-23 DIAGNOSIS — Z00.00 ROUTINE GENERAL MEDICAL EXAMINATION AT A HEALTH CARE FACILITY: ICD-10-CM

## 2020-11-23 DIAGNOSIS — Z13.820 SCREENING FOR OSTEOPOROSIS: ICD-10-CM

## 2020-11-23 DIAGNOSIS — Z12.31 ENCOUNTER FOR SCREENING MAMMOGRAM FOR MALIGNANT NEOPLASM OF BREAST: ICD-10-CM

## 2020-11-23 DIAGNOSIS — E56.9 VITAMIN DEFICIENCY: ICD-10-CM

## 2020-11-23 DIAGNOSIS — Z15.89 MTHFR MUTATION: ICD-10-CM

## 2020-11-23 DIAGNOSIS — E78.5 HYPERLIPIDEMIA: ICD-10-CM

## 2020-11-23 DIAGNOSIS — Z13.220 ENCOUNTER FOR SCREENING FOR LIPOID DISORDERS: ICD-10-CM

## 2020-11-23 LAB
ANION GAP SERPL CALCULATED.3IONS-SCNC: 11 MMOL/L (ref 5–18)
BUN SERPL-MCNC: 15 MG/DL (ref 8–28)
CALCIUM SERPL-MCNC: 9.6 MG/DL (ref 8.5–10.5)
CHLORIDE BLD-SCNC: 100 MMOL/L (ref 98–107)
CHOLEST SERPL-MCNC: 197 MG/DL
CO2 SERPL-SCNC: 27 MMOL/L (ref 22–31)
CREAT SERPL-MCNC: 0.68 MG/DL (ref 0.6–1.1)
FASTING STATUS PATIENT QL REPORTED: NO
GFR SERPL CREATININE-BSD FRML MDRD: >60 ML/MIN/1.73M2
GLUCOSE BLD-MCNC: 96 MG/DL (ref 70–125)
HDLC SERPL-MCNC: 97 MG/DL
LDLC SERPL CALC-MCNC: 88 MG/DL
POTASSIUM BLD-SCNC: 4.2 MMOL/L (ref 3.5–5)
SODIUM SERPL-SCNC: 138 MMOL/L (ref 136–145)
TRIGL SERPL-MCNC: 61 MG/DL

## 2020-11-24 LAB
25(OH)D3 SERPL-MCNC: 59 NG/ML (ref 30–80)
25(OH)D3 SERPL-MCNC: 59 NG/ML (ref 30–80)

## 2020-11-25 ENCOUNTER — COMMUNICATION - HEALTHEAST (OUTPATIENT)
Dept: INTERNAL MEDICINE | Facility: CLINIC | Age: 70
End: 2020-11-25

## 2020-12-21 PROBLEM — I63.9 RIGHT HEMISPHERE, CEREBRAL INFARCTION (H): Status: ACTIVE | Noted: 2020-12-21

## 2020-12-21 PROBLEM — M54.16 LUMBAR RADICULOPATHY: Chronic | Status: ACTIVE | Noted: 2020-12-21

## 2020-12-21 PROBLEM — I63.311 CEREBRAL INFARCTION DUE TO THROMBOSIS OF RIGHT MIDDLE CEREBRAL ARTERY (H): Chronic | Status: ACTIVE | Noted: 2020-12-21

## 2020-12-21 PROBLEM — M79.606 PAIN OF LOWER EXTREMITY: Status: ACTIVE | Noted: 2020-12-21

## 2020-12-21 PROBLEM — E72.12 HOMOZYGOUS FOR C677T POLYMORPHISM OF MTHFR (H): Chronic | Status: ACTIVE | Noted: 2020-12-21

## 2020-12-21 PROBLEM — M54.10 RADICULAR PAIN: Status: ACTIVE | Noted: 2020-12-21

## 2020-12-21 SDOH — HEALTH STABILITY: MENTAL HEALTH: HOW OFTEN DO YOU HAVE 6 OR MORE DRINKS ON ONE OCCASION?: NOT ASKED

## 2020-12-21 SDOH — HEALTH STABILITY: MENTAL HEALTH: HOW OFTEN DO YOU HAVE A DRINK CONTAINING ALCOHOL?: 2-4 TIMES A MONTH

## 2020-12-21 SDOH — HEALTH STABILITY: MENTAL HEALTH: HOW MANY STANDARD DRINKS CONTAINING ALCOHOL DO YOU HAVE ON A TYPICAL DAY?: NOT ASKED

## 2020-12-21 NOTE — PROGRESS NOTES
"Telephone follow-up visit requested by patient  Telephone follow-up visit due to the COVID-19 pandemic  Telephone number [phone]  Patient identified    .  The patient has been notified of following:     \"This telephone visit will be conducted via a call between you and your physician/provider. We have found that certain health care needs can be provided without the need for a physical exam. This service lets us provide the care you need with a short phone conversation. If a prescription is necessary we can send it directly to your pharmacy. If lab work is needed we can place an order for that and you can then stop by our lab to have the test done at a later time.    If during the course of the call the physician/provider feels a telephone visit is not appropriate, you will not be charged for this service.\"     Patient has given verbal consent for Telephone visit? Yes  Consent has been obtained for this service by 1 care team member: yes.              Chief Complaint   Follow for Baclofen and Gabapentin refills. Leg pain the same. Walking a little more which increases leg pain.     History of Present Illness    follow-up visit 12/22/2020  Last visit 4/6/2020 phone call    Diagnosis  Homozygous MTHFR  History of stroke  Lumbar radiculopathy chronic    Patient is on the aspirin Crestor and B complex for her stroke prevention reviewed today    Her back problems and leg problems are still there  Last injections were done by Dr. Sunil Pemberton  and he is not around now    She does okay walking on the flat even can walk a mile or more  If she is on an incline or rough road she tries use a walking stick  [place] moving around it is probably a lot more aggravating on her back    We talked about different ways to avoid aggravating the back using the walking stick using the medicines with things flareup the baclofen 1 tablet most the time at night and increase to 2/day when she is " "having a flareup          April 2020 visit review  Last year had difficulty with the abdominal pain originally had surgery May 2, 2019 for the hernia then in the fall 2019 had more abdominal pain had a CT scan that was \"okay\"  Later she saw the surgeons again at the end of January 2020 and they said that there was a slight hole the need to be repaired  February 2020 underwent repair of her hernia again and the abdominal pain has improved  Eating okay no bowel or bladder difficulty    She was down at [facility] in [place] and walking and doing fairly well her leg symptoms are well controlled  She uses the gabapentin 300 mg capsule at least 3 times per day with 1 capsule as a rescue  Use of the baclofen 10 mg at nighttime  We will try to maybe taper down    Does not need to use a cane or any assistive device  Still on the B complex for her homozygous MTHFR factor  Takes the full aspirin  Is currently now on Crestor        She has the difficulty of:    1. Past history of stroke.  2. History of some lumbar canal stenosis.  3. Had some left foot surgery back in April 2018.  4. had a colonoscopy, had severe pain, underwent a hernia surgery on the left, did not have a perforation. For surgery May 2, 2019, second repeat surgery February 2020.        Current diagnoses:    1. Centrum semiovale stroke in October 01, 2014, 2 cm in size, increased reflexes on the left compared to the right, chronic in nature.  2. History of hypertension for risk factors of stroke.  3. Homozygous MTHFR factor.  4. Obstructive sleep apnea, does have a CPAP.  5. Echo showed dilated left atrium. Long-term heart monitoring showed no atrial fibrillation in the past.  6. MRA of [place] and neck vessels were adequate.  7. LDL 98, came down to 66 in 2014.  8. Nonsmoker.  9. CPKs were normal when we worked up some of her leg pain.    Workup for leg pain included:    1. CPK normal.  2. MRI scan of the lumbar spine on May 05, 2015 showing:  a. " Moderate paraspinal muscle loss consistent with chronic difficulties.  b. L4-L5 moderate lumbar canal stenosis on the left L5 nerve root more so than the right.  3. May 24, 2016 L4-L5 steroid injection bilaterally.  4. Injection by Dr. Pemberton in November 2017 showed improvement.  5. Previous injections in the back on December 10, 2015, which gave her some relief.    She has also had some cervical pain problems with MRI scan of the cervical spine on August 25, 2016 compared to May 01, 2015:    a. Mild-to-moderate central stenosis at C5-C6 with severe bilateral foraminal stenosis.  b. C3-C4 severe right and mild left foraminal stenosis.  c. C4-C5 severe left foraminal stenosis.  d. C6-C7 moderate right and mild-to-moderate left foraminal stenosis.      Family history  Sister with stroke with patent foramen ovale          Review of Systems         No headache no chest pain no shortness of breath no nausea vomiting no diarrhea no fever chills no cough or sore throat     No bowel or bladder difficulties   No urgency     Leg pain well controlled with current medications   Walking more     No diplopia dysarthria dysphasia   No eighth Alda   Arms and legs work fairly well          Physical Exam       Exam is stable per patient    No aphasia over the phone   Memory and recall are good   No dysarthria     In the past on exam she had a little bit of blepharospasm     She says she get out of the chair fairly well and ambulate without difficulty     In the past when examined she had brisk reflexes on the left than the right          Assessment/Plan     Chronic lumbar radiculopathy (M54.16)   Neurontin 300 mg capsule 4 times daily (holds back 1 dose as a rescue)  Baclofen 10 mg tablet nightly we will consider using every other night or tapering down      CVA (Cerebral Vascular Accident) (I63.311)   Aspirin 325 mg daily   B complex, folate, B6, B12 for her MTHFR   Crestor through her primary   Risk factor reduction per  primary    Homozygous MTHFR mutation C677T (E72.12)   B complex as above       Recent laboratory data November 23, 2020  HDL 97/LDL 88  Sodium 138 potassium 4.2  BUN 15 creatinine 0.68  Glucose 96    Current plan:    1. Continue gabapentin 300 mg four per day for her back discomfort.  2. Baclofen 10 mg one tablet twice daily, takes mainly just at night see if the other tablet as a rescue  3. Has had injections by Dr. Sunil Pemberton in the past for her low back which helped in the past.  4. Had a carpal tunnel syndrome fixed by Dr. Rodriguez.  5. Is on a B-Complex, folate, B6, and B12 for MTHFR difficulties.  6. Should stay on the aspirin 325 mg tablet and the Crestor for her stroke prevention.  7. Follow-up in 1 year sooner if there is problems (summer 2021 so we can do an in person eval)        21 minutes discussion time today discussing the above medication side effects benefits.

## 2020-12-22 ENCOUNTER — RECORDS - HEALTHEAST (OUTPATIENT)
Dept: ADMINISTRATIVE | Facility: OTHER | Age: 70
End: 2020-12-22

## 2020-12-22 ENCOUNTER — VIRTUAL VISIT (OUTPATIENT)
Dept: NEUROLOGY | Facility: CLINIC | Age: 70
End: 2020-12-22
Payer: COMMERCIAL

## 2020-12-22 VITALS — HEIGHT: 64 IN | WEIGHT: 185 LBS | BODY MASS INDEX: 31.58 KG/M2

## 2020-12-22 DIAGNOSIS — I63.311 CEREBRAL INFARCTION DUE TO THROMBOSIS OF RIGHT MIDDLE CEREBRAL ARTERY (H): Chronic | ICD-10-CM

## 2020-12-22 DIAGNOSIS — E72.12 HOMOZYGOUS FOR C677T POLYMORPHISM OF MTHFR (H): Chronic | ICD-10-CM

## 2020-12-22 DIAGNOSIS — M54.16 LUMBAR RADICULOPATHY: Primary | Chronic | ICD-10-CM

## 2020-12-22 PROCEDURE — 99214 OFFICE O/P EST MOD 30 MIN: CPT | Mod: TEL | Performed by: PSYCHIATRY & NEUROLOGY

## 2020-12-22 RX ORDER — GABAPENTIN 300 MG/1
CAPSULE ORAL
COMMUNITY
Start: 2020-10-09 | End: 2020-12-22

## 2020-12-22 RX ORDER — GABAPENTIN 300 MG/1
300 CAPSULE ORAL 4 TIMES DAILY
Qty: 360 CAPSULE | Refills: 3 | Status: SHIPPED | OUTPATIENT
Start: 2020-12-22 | End: 2021-06-21

## 2020-12-22 RX ORDER — ALBUTEROL SULFATE 90 UG/1
2 AEROSOL, METERED RESPIRATORY (INHALATION)
COMMUNITY
Start: 2020-11-23 | End: 2021-11-10

## 2020-12-22 RX ORDER — ZOLPIDEM TARTRATE 5 MG/1
TABLET ORAL PRN
COMMUNITY
Start: 2020-11-23 | End: 2021-10-08

## 2020-12-22 RX ORDER — FLUTICASONE PROPIONATE 50 MCG
1 SPRAY, SUSPENSION (ML) NASAL PRN
COMMUNITY
End: 2021-11-10

## 2020-12-22 RX ORDER — LANOLIN ALCOHOL/MO/W.PET/CERES
3 CREAM (GRAM) TOPICAL
COMMUNITY

## 2020-12-22 RX ORDER — BACLOFEN 10 MG/1
10 TABLET ORAL 2 TIMES DAILY
Qty: 180 TABLET | Refills: 3 | Status: SHIPPED | OUTPATIENT
Start: 2020-12-22 | End: 2021-06-21

## 2020-12-22 RX ORDER — TRIAMTERENE AND HYDROCHLOROTHIAZIDE 37.5; 25 MG/1; MG/1
1 CAPSULE ORAL
COMMUNITY
Start: 2020-11-23 | End: 2021-11-10

## 2020-12-22 RX ORDER — ROSUVASTATIN CALCIUM 20 MG/1
20 TABLET, COATED ORAL
COMMUNITY
Start: 2019-05-06 | End: 2021-11-10

## 2020-12-22 RX ORDER — BACLOFEN 10 MG/1
10 TABLET ORAL AT BEDTIME
COMMUNITY
Start: 2020-04-06 | End: 2020-12-22

## 2020-12-22 ASSESSMENT — MIFFLIN-ST. JEOR: SCORE: 1344.15

## 2020-12-22 NOTE — LETTER
"    12/22/2020         RE: Maria Elena Sprague  [address] [place] [address]        Dear Colleague,    Thank you for referring your patient, Maria Elena Sprague, to the [facility] [place]. Please see a copy of my visit note below.    Telephone follow-up visit requested by patient  Telephone follow-up visit due to the COVID-19 pandemic  Telephone number [phone]  Patient identified    .  The patient has been notified of following:     \"This telephone visit will be conducted via a call between you and your physician/provider. We have found that certain health care needs can be provided without the need for a physical exam. This service lets us provide the care you need with a short phone conversation. If a prescription is necessary we can send it directly to your pharmacy. If lab work is needed we can place an order for that and you can then stop by our lab to have the test done at a later time.    If during the course of the call the physician/provider feels a telephone visit is not appropriate, you will not be charged for this service.\"     Patient has given verbal consent for Telephone visit? Yes  Consent has been obtained for this service by 1 care team member: yes.              Chief Complaint   Follow for Baclofen and Gabapentin refills. Leg pain the same. Walking a little more which increases leg pain.     History of Present Illness    follow-up visit 12/22/2020  Last visit 4/6/2020 phone call    Diagnosis  Homozygous MTHFR  History of stroke  Lumbar radiculopathy chronic    Patient is on the aspirin Crestor and B complex for her stroke prevention reviewed today    Her back problems and leg problems are still there  Last injections were done by Dr. Sunil Pemberton  and he is not around now    She does okay walking on the flat even can walk a mile or more  If she is on an incline or rough road she tries use a walking stick  [place] and [place] she [place] there moving around it is " "probably a lot more aggravating on her back    We talked about different ways to avoid aggravating the back using the walking stick using the medicines with things flareup the baclofen 1 tablet most the time at night and increase to 2/day when she is having a flareup          April 2020 visit review  Last year had difficulty with the abdominal pain originally had surgery May 2, 2019 for the hernia then in the fall 2019 had more abdominal pain had a CT scan that was \"okay\"  Later she saw the surgeons again at the end of January 2020 and they said that there was a slight hole the need to be repaired  February 2020 underwent repair of her hernia again and the abdominal pain has improved  Eating okay no bowel or bladder difficulty    She was down at [facility] in [place] and walking and doing fairly well her leg symptoms are well controlled  She uses the gabapentin 300 mg capsule at least 3 times per day with 1 capsule as a rescue  Use of the baclofen 10 mg at nighttime  We will try to maybe taper down    Does not need to use a cane or any assistive device  Still on the B complex for her homozygous MTHFR factor  Takes the full aspirin  Is currently now on Crestor        She has the difficulty of:    1. Past history of stroke.  2. History of some lumbar canal stenosis.  3. Had some left foot surgery back in April 2018.  4. had a colonoscopy, had severe pain, underwent a hernia surgery on the left, did not have a perforation. For surgery May 2, 2019, second repeat surgery February 2020.        Current diagnoses:    1. Centrum semiovale stroke in October 01, 2014, 2 cm in size, increased reflexes on the left compared to the right, chronic in nature.  2. History of hypertension for risk factors of stroke.  3. Homozygous MTHFR factor.  4. Obstructive sleep apnea, does have a CPAP.  5. Echo showed dilated left atrium. Long-term heart monitoring showed no atrial fibrillation in the past.  6. MRA of [place] and neck " vessels were adequate.  7. LDL 98, came down to 66 in 2014.  8. Nonsmoker.  9. CPKs were normal when we worked up some of her leg pain.    Workup for leg pain included:    1. CPK normal.  2. MRI scan of the lumbar spine on May 05, 2015 showing:  a. Moderate paraspinal muscle loss consistent with chronic difficulties.  b. L4-L5 moderate lumbar canal stenosis on the left L5 nerve root more so than the right.  3. May 24, 2016 L4-L5 steroid injection bilaterally.  4. Injection by Dr. Pemberton in November 2017 showed improvement.  5. Previous injections in the back on December 10, 2015, which gave her some relief.    She has also had some cervical pain problems with MRI scan of the cervical spine on August 25, 2016 compared to May 01, 2015:    a. Mild-to-moderate central stenosis at C5-C6 with severe bilateral foraminal stenosis.  b. C3-C4 severe right and mild left foraminal stenosis.  c. C4-C5 severe left foraminal stenosis.  d. C6-C7 moderate right and mild-to-moderate left foraminal stenosis.      Family history  Sister with stroke with patent foramen ovale          Review of Systems         No headache no chest pain no shortness of breath no nausea vomiting no diarrhea no fever chills no cough or sore throat     No bowel or bladder difficulties   No urgency     Leg pain well controlled with current medications   Walking more     No diplopia dysarthria dysphasia   No eighth Alda   Arms and legs work fairly well          Physical Exam       Exam is stable per patient    No aphasia over the phone   Memory and recall are good   No dysarthria     In the past on exam she had a little bit of blepharospasm     She says she get out of the chair fairly well and ambulate without difficulty     In the past when examined she had brisk reflexes on the left than the right          Assessment/Plan     Chronic lumbar radiculopathy (M54.16)   Neurontin 300 mg capsule 4 times daily (holds back 1 dose as a rescue)  Baclofen 10 mg tablet  nightly we will consider using every other night or tapering down      CVA (Cerebral Vascular Accident) (I63.311)   Aspirin 325 mg daily   B complex, folate, B6, B12 for her MTHFR   Crestor through her primary   Risk factor reduction per primary    Homozygous MTHFR mutation C677T (E72.12)   B complex as above       Recent laboratory data November 23, 2020  HDL 97/LDL 88  Sodium 138 potassium 4.2  BUN 15 creatinine 0.68  Glucose 96    Current plan:    1. Continue gabapentin 300 mg four per day for her back discomfort.  2. Baclofen 10 mg one tablet twice daily, takes mainly just at night see if the other tablet as a rescue  3. Has had injections by Dr. Sunil Pemberton in the past for her low back which helped in the past.  4. Had a carpal tunnel syndrome fixed by Dr. Rodriguez.  5. Is on a B-Complex, folate, B6, and B12 for MTHFR difficulties.  6. Should stay on the aspirin 325 mg tablet and the Crestor for her stroke prevention.  7. Follow-up in 1 year sooner if there is problems (summer 2021 so we can do an in person eval)        21 minutes discussion time today discussing the above medication side effects benefits.            Again, thank you for allowing me to participate in the care of your patient.        Sincerely,        Julito Silvestre MD

## 2020-12-22 NOTE — NURSING NOTE
Chief Complaint   Patient presents with     Follow Up     Pt states she is still having bilat leg pain, using both gabapentin and baclofen. Pt needing med refills.     Phone visit     [phone]     Annette Watts LPN on 12/22/2020 at 11:09 AM

## 2021-01-08 ENCOUNTER — COMMUNICATION - HEALTHEAST (OUTPATIENT)
Dept: INTERNAL MEDICINE | Facility: CLINIC | Age: 71
End: 2021-01-08

## 2021-01-15 ENCOUNTER — HOSPITAL ENCOUNTER (OUTPATIENT)
Dept: MAMMOGRAPHY | Facility: CLINIC | Age: 71
Discharge: HOME | End: 2021-01-15
Attending: PEDIATRICS

## 2021-01-15 DIAGNOSIS — Z12.31 ENCOUNTER FOR SCREENING MAMMOGRAM FOR MALIGNANT NEOPLASM OF BREAST: ICD-10-CM

## 2021-03-19 ENCOUNTER — COMMUNICATION - HEALTHEAST (OUTPATIENT)
Dept: INTERNAL MEDICINE | Facility: CLINIC | Age: 71
End: 2021-03-19

## 2021-03-19 DIAGNOSIS — G47.00 INSOMNIA: ICD-10-CM

## 2021-05-04 ENCOUNTER — COMMUNICATION - HEALTHEAST (OUTPATIENT)
Dept: INTERNAL MEDICINE | Facility: CLINIC | Age: 71
End: 2021-05-04

## 2021-05-04 DIAGNOSIS — I10 ESSENTIAL HYPERTENSION: ICD-10-CM

## 2021-05-24 ENCOUNTER — RECORDS - HEALTHEAST (OUTPATIENT)
Dept: ADMINISTRATIVE | Facility: CLINIC | Age: 71
End: 2021-05-24

## 2021-05-25 ENCOUNTER — RECORDS - HEALTHEAST (OUTPATIENT)
Dept: ADMINISTRATIVE | Facility: CLINIC | Age: 71
End: 2021-05-25

## 2021-05-26 ENCOUNTER — RECORDS - HEALTHEAST (OUTPATIENT)
Dept: ADMINISTRATIVE | Facility: CLINIC | Age: 71
End: 2021-05-26

## 2021-05-26 VITALS — DIASTOLIC BLOOD PRESSURE: 82 MMHG | SYSTOLIC BLOOD PRESSURE: 130 MMHG

## 2021-05-27 ENCOUNTER — RECORDS - HEALTHEAST (OUTPATIENT)
Dept: ADMINISTRATIVE | Facility: CLINIC | Age: 71
End: 2021-05-27

## 2021-05-27 NOTE — TELEPHONE ENCOUNTER
Dr. Shira Arredondo called and she said that she would like her CPAP Rx sent over to [facility]. Would you be able to give her a call back at [phone]? Thank you.

## 2021-05-27 NOTE — TELEPHONE ENCOUNTER
Medication Question or Clarification  Who is calling: Patient  What medication are you calling about? (include dose and sig)    Disp Refills Start End    zolpidem (AMBIEN) 5 MG tablet 45 tablet 0 4/8/2019     Sig: TAKE 1 TABLET BY MOUTH ONCE DAILY AT BEDTIME AS NEEDED FOR SLEEP    Sent to pharmacy as: zolpidem (AMBIEN) 5 MG tablet    E-Prescribing Status: Receipt confirmed by pharmacy (4/8/2019  8:09 AM CDT)      Who prescribed the medication?: Maverick Arreola CNP   What is your question/concern?: Patient stated she would like to know why she only received #45. Patient stated she usually gets #90.  Pharmacy: [facility]  Jb to leave a detailed message?: Yes  [phone]  Site CMT - Please call the pharmacy to obtain any additional needed information.

## 2021-05-27 NOTE — PROGRESS NOTES
"She is a 68 y.o. y/o female patient who comes to the sleep medicine clinic for PAP therapy follow up.    [facility], DME: [facility]    She was diagnosed with moderate POLLY (AHI 28.9/hr in 7/28/2011) and has been using nPAP therapy.  Most recent PAP settings: P= 10 cwp     Current PAP settings: same    Her symptoms are improved since she started using CPAP. She feels more refreshed when she wakes up.    She denies any PAP intolerance. She is using the device and tolerates the pressure.   Having some issues with mask slipping off and wants new style of mask.    30-Days Efficacy and Compliance Data  Residual AHI: 0.6/hr  Leak: 31.6 LPM (95%)  Days used > 4 hours: 28/30  Average usage per night: 7:10 hours  Mask Tolerance: Ok  Skin irritation: None    Physical Exam:  /64   Pulse 74   Ht 5' 4\" (1.626 m)   Wt 181 lb 8 oz (82.3 kg)   SpO2 97%   BMI 31.15 kg/m    General appearance: No apparent distress, well groomed.  Head: Normocephalic, atraumatic.  Musculoskeletal: No edema noted.  No clubbing or cyanosis.  Skin: Warm, dry, intact.  Neurologic: Alert and oriented to person, place and time   Gait is normal.  Psychiatric: Affect pleasant.  Mood good.     Labs/Studies:  I reviewed the efficacy and compliance report from his device. Data summarized on the HPI.     Assessment and Plan:  1. Obstructive Sleep Apnea.  Residual AHI is good  Compliance is good  Patient is benefiting from using PAP therapy.    Continue with P = same.    We counseled the patient on the importance of using her PAP device every night and the risks of not treating sleep apnea.      Patient verbalized understanding of these issues, agrees with the plan and all questions were answered today. Patient was given an opportuntity to voice any other symptoms or concerns not listed above. Patient did not have any other symptoms or concerns.      Patient told to return in 12 months.     Luciano HALEY Board Certified in Internal Medicine and " Sleep Medicine  [facility].    We spent a total of 15 minutes of face-to-face encounter and more than 50% of the encounter was used for counseling or coordination of care.

## 2021-05-27 NOTE — TELEPHONE ENCOUNTER
Controlled Substance Refill Request  Medication:   Requested Prescriptions     Pending Prescriptions Disp Refills     zolpidem (AMBIEN) 5 MG tablet [Pharmacy Med Name: ZOLPIDEM 5MG        TAB] 45 tablet 0     Sig: TAKE 1 TABLET BY MOUTH ONCE DAILY AT BEDTIME AS NEEDED FOR SLEEP     Date Last Fill: unknown  Pharmacy:[id number]    Submit electronically to pharmacy  Controlled Substance Agreement on File:   Encounter-Level CSA Scan Date - 10/18/2017:    Scan on 10/20/2017  2:39 PM (below)             Encounter-Level CSA Scan Date - 08/17/2016:    Scan on 8/17/2016: HE (below)         Last office visit: Last office visit pertaining to requested medication was 1/17/19.

## 2021-05-28 ENCOUNTER — RECORDS - HEALTHEAST (OUTPATIENT)
Dept: ADMINISTRATIVE | Facility: CLINIC | Age: 71
End: 2021-05-28

## 2021-05-28 NOTE — ANESTHESIA CARE TRANSFER NOTE
Last vitals:   Vitals:    05/02/19 2321   BP: 149/79   Pulse: (!) 101   Resp: 20   Temp: 37.2  C (99  F)   SpO2: 100%     Patient's level of consciousness is awake  Spontaneous respirations: yes  Maintains airway independently: yes  Dentition unchanged: yes  Oropharynx: oropharynx clear of all foreign objects    QCDR Measures:  ASA# 20 - Surgical Safety Checklist: WHO surgical safety checklist completed prior to induction    PQRS# 430 - Adult PONV Prevention: 4558F - Pt received => 2 anti-emetic agents (different classes) preop & intraop  ASA# 8 - Peds PONV Prevention: NA - Not pediatric patient, not GA or 2 or more risk factors NOT present  PQRS# 424 - Jeannette-op Temp Management: 4559F - At least one body temp DOCUMENTED => 35.5C or 95.9F within required timeframe  PQRS# 426 - PACU Transfer Protocol: - Transfer of care checklist used  ASA# 14 - Acute Post-op Pain: ASA14B - Patient did NOT experience pain >= 7 out of 10

## 2021-05-28 NOTE — PROGRESS NOTES
Order for Durable Medical Equipment was processed and equipment ordered.     DME provider: [facility]    Date Faxed: 4/19/2019    Ordering Provider: Luciano Silva MD    PAP Order Type: Mask/CPAP supplies    Fax Number: [phone]

## 2021-05-28 NOTE — TELEPHONE ENCOUNTER
Fax received from [facility] pharmacy requesting Prior Authorization    Medication Name:   zolpidem (AMBIEN) 5 MG tablet 90 tablet 0 5/17/2019     Sig: TAKE 1 TABLET BY MOUTH ONCE DAILY AT BEDTIME AS NEEDED FOR SLEEP          Insurance Plan: BCBS Part D   PBM:    Patient ID Number: [id number]    Please start PA process

## 2021-05-28 NOTE — PROGRESS NOTES
HISTORY:  Maria Elena Sprague is s/p open left inguinal hernia repair on May 2.  She is doing well.  She had a sensitivity to hydrocodone when she was in the hospital.  Since discharge, she has not really been taking any medications.  Some of her Steri-Strips have fallen off.  The area does feel more sore when she spends more time on her feet.    PHYSICAL EXAM:  Vitals:    05/17/19 0858   BP: 132/70   Patient Site: Right Arm   Patient Position: Sitting   Cuff Size: Adult Small   Pulse: 73   Temp: 96.6  F (35.9  C)   TempSrc: Tympanic   SpO2: 95%   Weight: 181 lb (82.1 kg)     GEN: No acute distress, comfortable  LUNGS: CTA bilaterally  CV: RRR  ABD: Left inguinal incision healing well.  No evidence of underlying infection or recurrence with Valsalva maneuver.  EXT: No cyanosis, edema or obvious abnormalities    ASSESSMENT:  Maria Elena Sprague is s/p left inguinal hernia repair    PLAN:  Intraoperative findings discussed with the patient.  Wait 2 more weeks before removing lifting restrictions  Okay for bathtub  Return to clinic as needed    Bonnie Khalil DO  [facility]  [phone]

## 2021-05-28 NOTE — ANESTHESIA POSTPROCEDURE EVALUATION
Patient: Maria Elena DUKES Peldonavon  REPAIR, HERNIA, INGUINAL, OPEN  Anesthesia type: general    Patient location: PACU  Last vitals:   Vitals Value Taken Time   /70 5/2/2019 11:30 PM   Temp 37.2  C (99  F) 5/2/2019 11:21 PM   Pulse 98 5/2/2019 11:38 PM   Resp 13 5/2/2019 11:38 PM   SpO2 94 % 5/2/2019 11:38 PM   Vitals shown include unvalidated device data.  Post vital signs: stable  Level of consciousness: awake and responds to simple questions  Post-anesthesia pain: pain controlled  Post-anesthesia nausea and vomiting: no  Pulmonary: unassisted, return to baseline  Cardiovascular: stable and blood pressure at baseline  Hydration: adequate  Anesthetic events: no    QCDR Measures:  ASA# 11 - Jeannette-op Cardiac Arrest: ASA11B - Patient did NOT experience unanticipated cardiac arrest  ASA# 12 - Jeannette-op Mortality Rate: ASA12B - Patient did NOT die  ASA# 13 - PACU Re-Intubation Rate: ASA13B - Patient did NOT require a new airway mgmt  ASA# 10 - Composite Anes Safety: ASA10A - No serious adverse event    Additional Notes:

## 2021-05-28 NOTE — ANESTHESIA PREPROCEDURE EVALUATION
Anesthesia Evaluation      Patient summary reviewed     Airway   Mallampati: II  Neck ROM: full   Pulmonary     breath sounds clear to auscultation  (+) shortness of breath, sleep apnea on CPAP, ,                          Cardiovascular   (+) hypertension, ,     Rhythm: regular  Rate: normal,         Neuro/Psych    (+) CVA ,     Endo/Other       GI/Hepatic/Renal - negative ROS           Dental                         Anesthesia Plan  Planned anesthetic: general LMA    ASA 3   Induction: intravenous   Anesthetic plan and risks discussed with: patient and spouse  Anesthesia plan special considerations: antiemetics,   Post-op plan: routine recovery

## 2021-05-28 NOTE — TELEPHONE ENCOUNTER
Controlled Substance Refill Request  Medication Name:   Requested Prescriptions     Pending Prescriptions Disp Refills     zolpidem (AMBIEN) 5 MG tablet 90 tablet 0     Sig: TAKE 1 TABLET BY MOUTH ONCE DAILY AT BEDTIME AS NEEDED FOR SLEEP     Date Last Fill: 4/1/19  Pharmacy: [facility]      Submit electronically to pharmacy  Controlled Substance Agreement Date Scanned:   Encounter-Level CSA Scan Date - 10/18/2017:    Scan on 10/20/2017  2:39 PM (below)             Encounter-Level CSA Scan Date - 08/17/2016:    Scan on 8/17/2016: HE (below)         Last office visit with prescriber/PCP: 8/14/2017 Jeremy Rhodes MD OR same dept: 1/17/2019 Zee Rhodes MD OR same specialty: 1/17/2019 Zee Rhodes MD  Last physical: Visit date not found Last MTM visit: Visit date not found

## 2021-05-29 ENCOUNTER — RECORDS - HEALTHEAST (OUTPATIENT)
Dept: ADMINISTRATIVE | Facility: CLINIC | Age: 71
End: 2021-05-29

## 2021-05-29 NOTE — TELEPHONE ENCOUNTER
Central PA team  [phone]  Pool: [facility] PA MED ([id number])          PA has been initiated.       PA form completed and faxed insurance via Cover My Meds     Key:  [id number]     Medication:  Zolpidem Tartrate 5MG tablets    Insurance:  [employer] [place] Medicare        Response will be received via fax and may take up to 5-10 business days depending on plan

## 2021-05-30 ENCOUNTER — RECORDS - HEALTHEAST (OUTPATIENT)
Dept: ADMINISTRATIVE | Facility: CLINIC | Age: 71
End: 2021-05-30

## 2021-05-30 VITALS — HEIGHT: 64 IN | BODY MASS INDEX: 31.5 KG/M2 | WEIGHT: 184.5 LBS

## 2021-05-30 VITALS — WEIGHT: 185.5 LBS | HEIGHT: 64 IN | BODY MASS INDEX: 31.67 KG/M2

## 2021-05-31 ENCOUNTER — RECORDS - HEALTHEAST (OUTPATIENT)
Dept: ADMINISTRATIVE | Facility: CLINIC | Age: 71
End: 2021-05-31

## 2021-05-31 VITALS — HEIGHT: 64 IN | BODY MASS INDEX: 31.24 KG/M2 | WEIGHT: 183 LBS

## 2021-05-31 VITALS — BODY MASS INDEX: 31.92 KG/M2 | WEIGHT: 187 LBS | HEIGHT: 64 IN

## 2021-06-01 ENCOUNTER — RECORDS - HEALTHEAST (OUTPATIENT)
Dept: ADMINISTRATIVE | Facility: CLINIC | Age: 71
End: 2021-06-01

## 2021-06-01 VITALS — HEIGHT: 65 IN | BODY MASS INDEX: 29.51 KG/M2 | WEIGHT: 177.13 LBS

## 2021-06-01 VITALS — WEIGHT: 175 LBS | BODY MASS INDEX: 29.16 KG/M2 | HEIGHT: 65 IN

## 2021-06-01 VITALS — WEIGHT: 174.1 LBS | BODY MASS INDEX: 29.72 KG/M2 | HEIGHT: 64 IN

## 2021-06-01 NOTE — PROGRESS NOTES
Chief Complaint   Patient presents with     Abdominal Pain       HPI:  Maria Elena Sprague is a 69 y.o. female who presents today complaining of left lower quadrant abdominal pain that started 4 hours ago.  Patient has a past medical history of previous incarcerated hernia that was surgically repaired with a mesh.  This pain is in a very similar location.  Patient has previously had a colonoscopy which did not show any signs of diverticulosis.  She has not had any fevers, constipation, blood in the stool, or vomiting.  She has felt nauseous.    History obtained from the patient.    Problem List:  2019: Incarcerated hernia  2018: Hallux limitus of left foot  Obstructive sleep apnea  Lump or mass in breast  Osteopenia  Hyperlipidemia  Hypertension  History of stroke  Insomnia  Overweight  Eczema  Obesity  Anxiety      Past Medical History:   Diagnosis Date     Arthritis     neck and back     Hallux limitus of left foot      Hyperlipidemia      Hypertension      Insomnia      Leg pain     since stroke     Low back pain      MTHFR mutation (H)     per patient report, tested after stroke in       Obesity      Osteopenia      Shortness of breath     with exertion     Sleep apnea     cpap     Stroke (H) 2013    balance issue       Social History     Tobacco Use     Smoking status: Former Smoker     Packs/day: 1.00     Years: 2.00     Pack years: 2.00     Last attempt to quit: 10/27/1974     Years since quittin.9     Smokeless tobacco: Never Used   Substance Use Topics     Alcohol use: Yes     Alcohol/week: 6.0 standard drinks     Types: 6 Glasses of wine per week       Review of Systems   Constitutional: Negative for appetite change and fever.   Gastrointestinal: Positive for abdominal pain and nausea. Negative for blood in stool, constipation, diarrhea and vomiting.       Vitals:    19 1804   BP: 151/81   Patient Site: Right Arm   Patient Position: Sitting   Cuff Size: Adult Large   Pulse: 68   Resp: 18    Temp: 97.6  F (36.4  C)   TempSrc: Oral   SpO2: 99%   Weight: 186 lb (84.4 kg)       Physical Exam  Constitutional:       General: She is not in acute distress.     Appearance: She is well-developed. She is not diaphoretic.   HENT:      Head: Normocephalic and atraumatic.      Right Ear: External ear normal.      Left Ear: External ear normal.   Eyes:      General:         Right eye: No discharge.         Left eye: No discharge.      Conjunctiva/sclera: Conjunctivae normal.   Cardiovascular:      Rate and Rhythm: Normal rate and regular rhythm.      Heart sounds: Normal heart sounds.   Pulmonary:      Effort: Pulmonary effort is normal. No respiratory distress.      Breath sounds: Normal breath sounds.   Abdominal:      Palpations: There is no mass.      Tenderness: There is tenderness in the left lower quadrant. There is guarding.   Psychiatric:         Behavior: Behavior normal.         Thought Content: Thought content normal.         Judgement: Judgment normal.         Clinical Decision Making:  Considering the patient's past medical history of hernia repair surgery in the similar location of her pain I recommend that she be seen in the emergency department for CT evaluation.  Diverticulitis and abdominal abscess is present on differential diagnosis.  Patient is agreeable to plan of getting CT evaluation at the ED tonight.  At the end of the encounter, I discussed results, diagnosis, medications. Discussed red flags for immediate return to clinic/ER, as well as indications for follow up if no improvement. Patient understood and agreed to plan. Patient was stable for discharge.    1. Abdominal pain, left lower quadrant     2. History of abdominal hernia           Patient Instructions   Complete work-up at [facility] emergency department

## 2021-06-02 ENCOUNTER — RECORDS - HEALTHEAST (OUTPATIENT)
Dept: ADMINISTRATIVE | Facility: CLINIC | Age: 71
End: 2021-06-02

## 2021-06-02 VITALS — WEIGHT: 179.6 LBS | BODY MASS INDEX: 30.83 KG/M2

## 2021-06-02 VITALS — BODY MASS INDEX: 30.48 KG/M2 | WEIGHT: 177.6 LBS

## 2021-06-02 VITALS — WEIGHT: 177 LBS | BODY MASS INDEX: 30.38 KG/M2

## 2021-06-02 VITALS — BODY MASS INDEX: 29.94 KG/M2 | WEIGHT: 175.4 LBS | HEIGHT: 64 IN

## 2021-06-02 VITALS — WEIGHT: 176 LBS | BODY MASS INDEX: 30.21 KG/M2

## 2021-06-02 NOTE — PROGRESS NOTES
Assessment and Plan:     1. Insomnia    Will continue as she has been on this chronically and has tried and failed other alternatives (trazodone, etc)    - zolpidem (AMBIEN) 5 MG tablet; TAKE 1 TABLET BY MOUTH ONCE DAILY AT BEDTIME AS NEEDED FOR SLEEP  Dispense: 90 tablet; Refill: 0    2. Essential hypertension    Stable, well controlled    - triamterene-hydrochlorothiazide (DYAZIDE) 37.5-25 mg per capsule; Take 1 capsule by mouth daily.  Dispense: 90 capsule; Refill: 3  - rosuvastatin (CRESTOR) 20 MG tablet; Take 1 tablet (20 mg total) by mouth daily.  Dispense: 90 tablet; Refill: 3  - Comprehensive Metabolic Panel    3. Hyperlipidemia    - rosuvastatin (CRESTOR) 20 MG tablet; Take 1 tablet (20 mg total) by mouth daily.  Dispense: 90 tablet; Refill: 3  - Lipid Cascade    4. Hypertension    - triamterene-hydrochlorothiazide (DYAZIDE) 37.5-25 mg per capsule; Take 1 capsule by mouth daily.  Dispense: 90 capsule; Refill: 3  - rosuvastatin (CRESTOR) 20 MG tablet; Take 1 tablet (20 mg total) by mouth daily.  Dispense: 90 tablet; Refill: 3  - Comprehensive Metabolic Panel    5. Stroke Syndrome    - rosuvastatin (CRESTOR) 20 MG tablet; Take 1 tablet (20 mg total) by mouth daily.  Dispense: 90 tablet; Refill: 3    6. Wheezing    - albuterol (PROAIR HFA;PROVENTIL HFA;VENTOLIN HFA) 90 mcg/actuation inhaler; Inhale 2 puffs every 6 (six) hours as needed for wheezing.  Dispense: 8.5 g; Refill: 11    7. Family history of malignant neoplasm of ovary    Has been getting routine pelvic ultrasounds and CA-125 levels.  Given the controversy surrounding this, will defer to GYN  - Ambulatory referral to Gynecology    8. Routine general medical examination at a health care facility    - Hepatitis C Antibody (Anti-HCV)    9. Menopause    - Vitamin D, Total (25-Hydroxy)    10. Body mass index (bmi) 31.0-31.9, adult     - Vitamin D, Total (25-Hydroxy)     The patient's current medical problems were reviewed.    The following health  maintenance schedule was reviewed with the patient and provided in printed form in the after visit summary:   Health Maintenance   Topic Date Due     HEPATITIS C SCREENING  1950     ZOSTER VACCINES (2 of 3) 02/14/2011     FALL RISK ASSESSMENT  10/23/2019     MEDICARE ANNUAL WELLNESS VISIT  10/23/2019     TD 18+ HE  12/16/2019     MAMMOGRAM  11/13/2020     DXA SCAN  11/20/2020     ADVANCE CARE PLANNING  10/23/2023     COLONOSCOPY  04/30/2029     PNEUMOCOCCAL IMMUNIZATION 65+ LOW/MEDIUM RISK  Completed        Subjective:   Chief Complaint: Maria Elena Sprague is an 69 y.o. female here for an Annual Wellness visit.   HPI: Maria Elena comes in today to establish care as well as for a physical exam.  She has been having some left-sided groin pain over the last 6 months, since her left inguinal hernia surgery.  I told her that we could conceivably evaluate this at another visit and she is going to make an appointment at her convenience.  She is otherwise feeling well.  Blood pressure is under control.  We will need to check her lipids today.  She is looking to get a referral to gynecology as she has a family history of ovarian cancer and wants to discuss monitoring this further.    Past medical and surgical history reviewed.  Medications and allergies were reviewed and reconciled.  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  She is , has 2 children, and 5 grandchildren.  Remote history of smoking.    Review of Systems:    Please see above.  The rest of the review of systems are negative for all systems.    Patient Care Team:  Jeremy Rhodes MD as PCP - General (Internal Medicine)  Zee Rhodes MD as Assigned PCP     Patient Active Problem List   Diagnosis     Obstructive sleep apnea     Hyperlipidemia     Hypertension     History of stroke     Insomnia     Family history of malignant neoplasm of ovary     ILLNESSES, HOSPITALIZATIONS, AND OPERATIONS:    #1.  History of a CVA in 2010, she has residual  back from this.    2.  Hypertension.    3.  Hyperlipidemia.    4.  Obstructive sleep apnea.    5.  Status post left inguinal hernia repair in 2019.    6.  Status post appendectomy in .    7.  Status post right carpal tunnel release.  Family History   Problem Relation Age of Onset     Ovarian cancer Mother         late 70s     Stroke Mother      Colon cancer Father         late 60s     Prostate cancer Brother 56     Pancreatic cancer Maternal Aunt 82     Stomach cancer Maternal Uncle         80s     Cancer Paternal Uncle         melanoma and bladder in 80s     Skin cancer Maternal Grandmother      Colon cancer Maternal Grandfather         early 80s     Stomach cancer Paternal Grandfather         60s     Stomach cancer Other 81        paternal-maternal great-aunt     Kidney cancer Other         paternal side,  at 57     Colon cancer Other 52        paternal side     Stomach cancer Other         paternal side,  at 54     Leukemia Other         paternal side, late 50s     Breast cancer Other         maternal first-cousin, late 40s     Lymphoma Other 74        paternal side, MALT     Lymphoma Other         paternal side,  at 50     Stomach cancer Other 84        paternal side     Cancer Other         paternal side, breast and stomach cancer     Uterine cancer Other 78        paternal side     Cancer Other 83        paternal side, prostate and bone cancer     Leukemia Other         paternal side, 80s     Lung cancer Other         paternal side, 90s     Lung cancer Maternal Aunt         80s     Skin cancer Maternal Uncle      Cancer Maternal Uncle         stomach and colon cancer in 60s     Cancer Maternal Uncle         pituitary tumor 50s     Breast cancer Paternal Aunt         Age in 70's     Breast cancer Paternal Aunt         Age in 70's     Stroke Sister       Social History     Socioeconomic History     Marital status:      Spouse name: Not on file     Number of children: Not on file     Years  of education: Not on file     Highest education level: Not on file   Occupational History     Not on file   Social Needs     Financial resource strain: Not on file     Food insecurity:     Worry: Not on file     Inability: Not on file     Transportation needs:     Medical: Not on file     Non-medical: Not on file   Tobacco Use     Smoking status: Former Smoker     Packs/day: 1.00     Years: 2.00     Pack years: 2.00     Last attempt to quit: 10/27/1974     Years since quittin.0     Smokeless tobacco: Never Used   Substance and Sexual Activity     Alcohol use: Yes     Alcohol/week: 6.0 standard drinks     Types: 6 Glasses of wine per week     Drug use: No     Sexual activity: Not on file   Lifestyle     Physical activity:     Days per week: Not on file     Minutes per session: Not on file     Stress: Not on file   Relationships     Social connections:     Talks on phone: Not on file     Gets together: Not on file     Attends Jainism service: Not on file     Active member of club or organization: Not on file     Attends meetings of clubs or organizations: Not on file     Relationship status: Not on file     Intimate partner violence:     Fear of current or ex partner: Not on file     Emotionally abused: Not on file     Physically abused: Not on file     Forced sexual activity: Not on file   Other Topics Concern     Not on file   Social History Narrative     40+ years. Several grown kids and grandkids      Current Outpatient Medications   Medication Sig Dispense Refill     albuterol (PROAIR HFA;PROVENTIL HFA;VENTOLIN HFA) 90 mcg/actuation inhaler Inhale 2 puffs every 6 (six) hours as needed for wheezing. 8.5 g 11     aspirin 325 MG tablet Take 325 mg by mouth at bedtime.        b complex vitamins tablet Take 1 tablet by mouth daily.       baclofen (LIORESAL) 10 MG tablet Take 10 mg by mouth every evening.              cholecalciferol, vitamin D3, (VITAMIN D3) 2,000 unit cap Take 2,000 Units by mouth  "daily.        docosahexanoic acid/epa (FISH OIL ORAL) Take by mouth.       gabapentin (NEURONTIN) 300 MG capsule Take 300 mg by mouth 4 (four) times a day.       magnesium 250 mg Tab Take 5 tablets by mouth daily.       MAGNESIUM CITRATE ORAL Take 400 mg by mouth at bedtime.              melatonin 3 mg Tab tablet Take 3 mg by mouth at bedtime as needed.       multivitamin with minerals (THERA-M) 9 mg iron-400 mcg Tab tablet Take 1 tablet by mouth daily.       omega 3-dha-epa-fish oil 1,200 (144-216) mg cap Take 1,200 mg by mouth daily.        rosuvastatin (CRESTOR) 20 MG tablet Take 1 tablet (20 mg total) by mouth daily. 90 tablet 3     triamterene-hydrochlorothiazide (DYAZIDE) 37.5-25 mg per capsule Take 1 capsule by mouth daily. 90 capsule 3     vitamin B complex (B COMPLEX ORAL) Take by mouth.       zolpidem (AMBIEN) 5 MG tablet TAKE 1 TABLET BY MOUTH ONCE DAILY AT BEDTIME AS NEEDED FOR SLEEP 90 tablet 0     No current facility-administered medications for this visit.       Objective:   Vital Signs:   Visit Vitals  /62   Pulse 64   Ht 5' 4\" (1.626 m)   Wt 185 lb (83.9 kg)   BMI 31.76 kg/m         VisionScreening:  No exam data present     PHYSICAL EXAM  OBJECTIVE:    In general the patient is alert pleasant and in no acute distress.    HEENT: Pupils equal round reactive light.  Oropharynx is clear.  Lymphatic shows no anterior posterior cervical lymphadenopathy.  No thyromegaly or other masses noted.    Cardiovascular: S1-S2 regular in rhythm no murmurs gallops rubs    Lungs are clear    Abdomen is soft nontender nondistended.  No HSM.    Extremities show no pedal edema present bilaterally.  DP pulses are 2+ and normal bilaterally.    Skin exam shows no concerning skin lesions.    Assessment Results 10/31/2019   Activities of Daily Living No help needed   Instrumental Activities of Daily Living No help needed   Mini Cog Total Score 5   Some recent data might be hidden     A Mini-Cog score of 0-2 suggests " the possibility of dementia, score of 3-5 suggests no dementia    Identified Health Risks:     Information on urinary incontinence and treatment options given to patient.

## 2021-06-02 NOTE — PATIENT INSTRUCTIONS - HE
Patient Education   Urinary Incontinence, Female (Adult)  Urinary incontinence means loss of control of the bladder. This problem affects many women, especially as they get older. If you have incontinence, you may be embarrassed to ask for help. But know that this problem can be treated.  Types of Incontinence  There are different types of incontinence. Two of the main types are described here. You can have more than one type.    Stress incontinence. With this type, urine leaks when pressure (stress) is put on the bladder. This may happen when you cough, sneeze, or laugh. Stress incontinence most often occurs because the pelvic floor muscles that support the bladder and urethra are weak. This can happen after pregnancy and vaginal childbirth or a hysterectomy. It can also be due to excess body weight or hormone changes.    Urge incontinence (also called overactive bladder). With this type, a sudden urge to urinate is felt often. This may happen even though there may not be much urine in the bladder. The need to urinate often during the night is common. Urge incontinence most often occurs because of bladder spasms. This may be due to bladder irritation or infection. Damage to bladder nerves or pelvic muscles, constipation, and certain medicines can also lead to urge incontinence.  Treatment of urinary incontinence depends on the cause. Further evaluation is needed to find the type you have. This will likely include an exam and certain tests. Based on the results, you and your healthcare provider can then plan treatment. Until a diagnosis is made, the home care tips below can help relieve symptoms.  Home care    Do pelvic floor muscle exercises, if they are prescribed. The pelvic floor muscles help support the bladder and urethra. Many women find that their symptoms improve when doing special exercises that strengthen these muscles. To do the exercises contract the muscles you would use to stop your stream of urine,  but do this when you re not urinating. Hold for 10 seconds, then relax. Repeat 10 to 20 times in a row, at least 3 times a day. Your provider may give you other instructions for how to do the exercises and how often.    Keep a bladder diary. This helps track how often and how much you urinate over a set period of time. Bring this diary with you to your next visit with the provider. The information can help your provider learn more about your bladder problem.    Lose weight, if advised to by your provider. Excess weight puts pressure on the bladder. Your provider can help you create a weight-loss plan that s right for you. This may include exercising more and making certain diet changes.    Don't consume foods and drinks that may irritate the bladder. These can include alcohol and caffeinated drinks.    Quit smoking. Smoking and other tobacco use can lead to chronic cough that strains the pelvic floor muscles. Smoking may also damage the bladder and urethra. Talk with your provider about treatments or methods you can use to quit smoking.    If drinking large amounts of fluid causes you to have symptoms, you may be advised to limit your fluid intake. You may also be advised to drink most of your fluids during the day and to limit fluids at night.    If you re worried about urine leakage or accidents, you may wear absorbent pads to catch urine. Change the pads often. This helps reduce discomfort. It may also reduce the risk of skin or bladder infections.  Follow-up care  Follow up with your healthcare provider, or as directed. It may take some to find the right treatment for your problem. Your treatment plan may include special therapies or medicines. Certain procedures or surgery may also be options. Be sure to discuss any questions you have with your provider.  When to seek medical advice  Call the healthcare provider right away if any of these occur:    Fever of 100.4 F (38 C) or higher, or as directed by your  provider    Bladder pain or fullness    Abdominal swelling    Nausea or vomiting    Back pain    Weakness, dizziness or fainting  Date Last Reviewed: 10/1/2017    5069-6833 [employer]. [address] [place] [address], [place] [address]. All rights reserved. This information is not intended as a substitute for professional medical care. Always follow your healthcare professional's instructions.       Advance Directive  Patient s advance directive was discussed and I am comfortable with the patient s wishes.  Patient Education   Personalized Prevention Plan  You are due for the preventive services outlined below.  Your care team is available to assist you in scheduling these services.  If you have already completed any of these items, please share that information with your care team to update in your medical record.  Health Maintenance   Topic Date Due     HEPATITIS C SCREENING  1950     ZOSTER VACCINES (2 of 3) 02/14/2011     FALL RISK ASSESSMENT  10/23/2019     MEDICARE ANNUAL WELLNESS VISIT  10/23/2019     TD 18+ HE  12/16/2019     MAMMOGRAM  11/13/2020     DXA SCAN  11/20/2020     ADVANCE CARE PLANNING  10/23/2023     COLONOSCOPY  04/30/2029     PNEUMOCOCCAL IMMUNIZATION 65+ LOW/MEDIUM RISK  Completed

## 2021-06-03 VITALS
DIASTOLIC BLOOD PRESSURE: 81 MMHG | BODY MASS INDEX: 31.93 KG/M2 | HEART RATE: 68 BPM | OXYGEN SATURATION: 99 % | WEIGHT: 186 LBS | SYSTOLIC BLOOD PRESSURE: 151 MMHG | TEMPERATURE: 97.6 F | RESPIRATION RATE: 18 BRPM

## 2021-06-03 VITALS
DIASTOLIC BLOOD PRESSURE: 62 MMHG | SYSTOLIC BLOOD PRESSURE: 132 MMHG | WEIGHT: 185 LBS | HEIGHT: 64 IN | HEART RATE: 64 BPM | BODY MASS INDEX: 31.58 KG/M2

## 2021-06-03 VITALS — WEIGHT: 181 LBS | BODY MASS INDEX: 31.07 KG/M2

## 2021-06-03 VITALS — HEIGHT: 64 IN | BODY MASS INDEX: 30.25 KG/M2 | WEIGHT: 177.2 LBS

## 2021-06-03 VITALS — HEIGHT: 64 IN | BODY MASS INDEX: 30.99 KG/M2 | WEIGHT: 181.5 LBS

## 2021-06-04 VITALS
OXYGEN SATURATION: 96 % | TEMPERATURE: 97.8 F | WEIGHT: 187.38 LBS | SYSTOLIC BLOOD PRESSURE: 127 MMHG | HEART RATE: 69 BPM | DIASTOLIC BLOOD PRESSURE: 79 MMHG | BODY MASS INDEX: 31.99 KG/M2 | HEIGHT: 64 IN | RESPIRATION RATE: 16 BRPM

## 2021-06-04 VITALS
WEIGHT: 187 LBS | DIASTOLIC BLOOD PRESSURE: 78 MMHG | HEIGHT: 64 IN | SYSTOLIC BLOOD PRESSURE: 146 MMHG | BODY MASS INDEX: 31.92 KG/M2

## 2021-06-04 VITALS
WEIGHT: 190 LBS | SYSTOLIC BLOOD PRESSURE: 120 MMHG | DIASTOLIC BLOOD PRESSURE: 60 MMHG | BODY MASS INDEX: 32.61 KG/M2 | HEART RATE: 76 BPM

## 2021-06-04 VITALS
BODY MASS INDEX: 32.16 KG/M2 | HEART RATE: 68 BPM | SYSTOLIC BLOOD PRESSURE: 145 MMHG | OXYGEN SATURATION: 97 % | DIASTOLIC BLOOD PRESSURE: 84 MMHG | WEIGHT: 187.38 LBS

## 2021-06-04 VITALS
DIASTOLIC BLOOD PRESSURE: 86 MMHG | RESPIRATION RATE: 16 BRPM | HEART RATE: 69 BPM | TEMPERATURE: 97.5 F | BODY MASS INDEX: 31.7 KG/M2 | OXYGEN SATURATION: 97 % | SYSTOLIC BLOOD PRESSURE: 143 MMHG | WEIGHT: 184.7 LBS

## 2021-06-04 VITALS
BODY MASS INDEX: 31.64 KG/M2 | OXYGEN SATURATION: 99 % | WEIGHT: 184.3 LBS | HEART RATE: 83 BPM | DIASTOLIC BLOOD PRESSURE: 89 MMHG | RESPIRATION RATE: 16 BRPM | TEMPERATURE: 98.3 F | SYSTOLIC BLOOD PRESSURE: 157 MMHG

## 2021-06-04 NOTE — PROGRESS NOTES
SUBJECTIVE:  Maria Elena is a 69 y.o. female who presents to urgent care with about 3 weeks of cough. It has worsened over the last week  Has started wheeze, cough now productive, chest tightness, chills, ear pain, and sinus pressure. Some fatigue.    No fever, muscle aches, ear pain, sore throat, chest pain, n/v/d. She has been using mucinex which helps her expel the phlegm    Chief Complaint   Patient presents with     Cough     trouble sleeping, congestion, runny nose,      ROS: as stated in HPI, otherwise negative    Past Medical History:   Diagnosis Date     Hyperlipidemia      Obesity      Osteopenia      Stroke (H) 2013    balance issue      Social History     Socioeconomic History     Marital status:      Spouse name: Not on file     Number of children: Not on file     Years of education: Not on file     Highest education level: Not on file   Occupational History     Not on file   Social Needs     Financial resource strain: Not on file     Food insecurity:     Worry: Not on file     Inability: Not on file     Transportation needs:     Medical: Not on file     Non-medical: Not on file   Tobacco Use     Smoking status: Former Smoker     Packs/day: 1.00     Years: 2.00     Pack years: 2.00     Last attempt to quit: 10/27/1974     Years since quittin.1     Smokeless tobacco: Never Used   Substance and Sexual Activity     Alcohol use: Yes     Alcohol/week: 6.0 standard drinks     Types: 6 Glasses of wine per week     Drug use: No     Sexual activity: Not on file   Lifestyle     Physical activity:     Days per week: Not on file     Minutes per session: Not on file     Stress: Not on file   Relationships     Social connections:     Talks on phone: Not on file     Gets together: Not on file     Attends Gnosticism service: Not on file     Active member of club or organization: Not on file     Attends meetings of clubs or organizations: Not on file     Relationship status: Not on file     Intimate partner  violence:     Fear of current or ex partner: Not on file     Emotionally abused: Not on file     Physically abused: Not on file     Forced sexual activity: Not on file   Other Topics Concern     Not on file   Social History Narrative     40+ years. Several grown kids and grandkids          Current Outpatient Medications:      albuterol (PROAIR HFA;PROVENTIL HFA;VENTOLIN HFA) 90 mcg/actuation inhaler, Inhale 2 puffs every 6 (six) hours as needed for wheezing., Disp: 8.5 g, Rfl: 11     aspirin 325 MG tablet, Take 325 mg by mouth at bedtime. , Disp: , Rfl:      b complex vitamins tablet, Take 1 tablet by mouth daily., Disp: , Rfl:      baclofen (LIORESAL) 10 MG tablet, Take 10 mg by mouth every evening.    , Disp: , Rfl:      cholecalciferol, vitamin D3, (VITAMIN D3) 2,000 unit cap, Take 2,000 Units by mouth daily. , Disp: , Rfl:      docosahexanoic acid/epa (FISH OIL ORAL), Take by mouth., Disp: , Rfl:      gabapentin (NEURONTIN) 300 MG capsule, Take 300 mg by mouth 4 (four) times a day., Disp: , Rfl:      magnesium 250 mg Tab, Take 5 tablets by mouth daily., Disp: , Rfl:      MAGNESIUM CITRATE ORAL, Take 400 mg by mouth at bedtime.    , Disp: , Rfl:      melatonin 3 mg Tab tablet, Take 3 mg by mouth at bedtime as needed., Disp: , Rfl:      multivitamin with minerals (THERA-M) 9 mg iron-400 mcg Tab tablet, Take 1 tablet by mouth daily., Disp: , Rfl:      rosuvastatin (CRESTOR) 20 MG tablet, Take 1 tablet (20 mg total) by mouth daily., Disp: 90 tablet, Rfl: 3     triamterene-hydrochlorothiazide (DYAZIDE) 37.5-25 mg per capsule, Take 1 capsule by mouth daily., Disp: 90 capsule, Rfl: 3     zolpidem (AMBIEN) 5 MG tablet, TAKE 1 TABLET BY MOUTH ONCE DAILY AT BEDTIME AS NEEDED FOR SLEEP, Disp: 90 tablet, Rfl: 0     omega 3-dha-epa-fish oil 1,200 (144-216) mg cap, Take 1,200 mg by mouth daily. , Disp: , Rfl:      vitamin B complex (B COMPLEX ORAL), Take by mouth., Disp: , Rfl:      OBJECTIVE:  /89   Pulse 83    Temp 98.3  F (36.8  C) (Oral)   Resp 16   Wt 184 lb 4.8 oz (83.6 kg)   SpO2 99%   BMI 31.64 kg/m     GENERAL APPEARANCE: Appears well and in no acute distress  HEENT: HEAD: ATNC  EYES: Conjunctiva clear, EOMI  EARS: TM's pearly bilaterally with intact landmarks bilaterally. No effusion or erythema  NOSE: Nares Patent.  sinuses nontender  THROAT: no Posterior oropharynx erythema, tonsils 0+ bilaterally, no exudate, uvula midline, non occluded  NECK: Supple, non tender, no cervical adenopathy  LUNGS: No respiratory distress, diffuse wheeze over R bronchi.  CV: RRR, no murmurs, rubs or gallops, no cyanosis  NUERO: AOx3, normal mentation  PSYCH: normal mood and affect        ASSESSMENT/PLAN:  Maria Elena was seen today for cough.    Diagnoses and all orders for this visit:    Acute bronchitis, unspecified organism  -     azithromycin (ZITHROMAX Z-JEANNE) 250 MG tablet; Take 2 tablets (500 mg) on  Day 1,  followed by 1 tablet (250 mg) once daily on Days 2 through 5.      1) Exam and history bronchitis and given acute worsening and length of symptoms I want to cover with an antibiotic. SERD. Probiotic recommended  We discussed signs and symptoms that would warrant further evaluation from a health care provider. The plan of care was discussed.They understand and agree with the course of treatments. A printed summary was given including instructions and medications.    Silviano Barcenas PA-C

## 2021-06-04 NOTE — TELEPHONE ENCOUNTER
"RN triage   Call from pt   Pt states she has been seen 2x this month for cough/cold-- finished z elmer --   Still on prednisone   Felt some better for a couple of days -  Now feeling worse   Has shortness of breath w/ activity -- no shortness of breath at rest   Still very congested-- clear phlegm --   Cough much less than before   shortness of breath worse   Feeling very fatigued  Denies chest pain   States has lung pain / burning  With and after cough  Has hx of clot/stroke == per protocol = should go to ED   Pt requesting PCP advice -- pt prefers not to go to ED   Please advise = when and where do you want pt seen ?  Brianne Ramirez RN [facility] RN triage        Reason for Disposition    Any history of prior \"blood clot\" in leg or lungs    Protocols used: BREATHING DIFFICULTY-A-[place]      "

## 2021-06-04 NOTE — PATIENT INSTRUCTIONS - HE
"I think that your symptoms are from bronchitis.    No more antibiotics.    No more prednisone.    I think your symptoms will get better with time.    We could consider d-dimer blood test if symptoms do not continue to improve.    If you are still having significant problems 1 week from now, call the clinic and I can place d-dimer blood test order for \"lab only\" appointment.    If d-dimer were positive, we would have to proceed with CT scan of chest with contrast.    If you develop shortness of breath, chest pain, or pain with breathing, follow-up sooner or seek immediate medical evaluation.    For now, vital signs are stable/normal.  Normal physical exam.  "

## 2021-06-04 NOTE — TELEPHONE ENCOUNTER
FYI - Status Update    Who is Calling: Patient     Update:   Patient is feeling a lot better, however still has increased fatigue, cough is pretty much gone.    Okay to leave a detailed message?:    No return call needed

## 2021-06-04 NOTE — TELEPHONE ENCOUNTER
"Pt reports she was seen at [facility] 12/6/19 for a cough, given a z-elmer and \"not improving\". No fever. Pt reports she continues to wheeze when lying down or sitting in a chair. Coughing \"really frequently\". Productive of white phlegm \"quite a bit\". Pt reports she feels chest congestion and denies dyspnea but states harder to breath when lying down (not a new symptom). Nasal congestion persists as well.     Reviewed Care Advice for cough/nasal congestion with pt and advised pt to be seen in clinic tomorrow for evaluation per protocol.    Pt verbalizes understanding and agrees to plan.       Reason for Disposition    [1] Nasal discharge AND [2] present > 10 days    Protocols used: COUGH - CHRONIC-A-AH      "

## 2021-06-04 NOTE — TELEPHONE ENCOUNTER
Controlled Substance Refill Request  Medication:   Requested Prescriptions     Pending Prescriptions Disp Refills     zolpidem (AMBIEN) 5 MG tablet [Pharmacy Med Name: Zolpidem Tartrate 5 MG Oral Tablet] 90 tablet 0     Sig: TAKE 1 TABLET BY MOUTH ONCE DAILY AT BEDTIME AS NEEDED FOR SLEEP     Date Last Fill: 10/31/19  Pharmacy: [id number]   Submit electronically to pharmacy  Controlled Substance Agreement on File:   Encounter-Level CSA Scan Date - 10/23/2018:    Scan on 10/26/2018 11:21 AM           Encounter-Level CSA Scan Date - 10/18/2017:    Scan on 10/20/2017  2:39 PM           Encounter-Level CSA Scan Date - 08/17/2016:    Scan on 8/17/2016: HE       Last office visit: Last office visit pertaining to requested medication was 12/23/19.

## 2021-06-04 NOTE — PROGRESS NOTES
"Clinic Note    Assessment:     Assessment and Plan:  1. Bronchitis  We had a long discussion today about whether or not she needed to have a CTA to rule out PE.  I explained to her that her symptoms and presentation are more consistent with viral bronchitis.  I offered her CT imaging and d-dimer testing today but she would like to hold off for the next week and see if her symptoms continue to improve.  See patient instructions below for plan of care.    2. MTHFR mutation (H)  Chart updated today       Patient Instructions   I think that your symptoms are from bronchitis.    No more antibiotics.    No more prednisone.    I think your symptoms will get better with time.    We could consider d-dimer blood test if symptoms do not continue to improve.    If you are still having significant problems 1 week from now, call the clinic and I can place d-dimer blood test order for \"lab only\" appointment.    If d-dimer were positive, we would have to proceed with CT scan of chest with contrast.    If you develop shortness of breath, chest pain, or pain with breathing, follow-up sooner or seek immediate medical evaluation.    For now, vital signs are stable/normal.  Normal physical exam.    Return in about 1 week (around 12/30/2019).         Subjective:      Maria Elena Sprague is a 69 y.o. female who comes the clinic today for follow-up.    She is been dealing with a cough for essentially 1 month.    Initially evaluated by [facility] on 12/6.  Diagnosed with bronchitis and given azithromycin at that time.  Patient thinks she may have had sinus infection at that time which seemed to clear up with Z-Gavin.    She was evaluated 1 week later at [facility] with persistent cough symptoms.  She had wheezing on exam and was given 12-day prednisone taper.    She was evaluated at [facility] again 1 week later stating that her cough symptoms were essentially the same after using prednisone.  She had a chest x-ray at that time which revealed no evidence for " pneumonia.    She called into the nurse triage line on  stating that she was still feeling sick.  Scheduled with me today.    Today, patient states that she continues to feel ill but admits that her symptoms have gradually improved somewhat over the last month or so.  No longer having wheezing.  No sinus pain or pressure.  No nasal congestion.  No sore throat.  No fevers.    She denies any chest pain.  No pain with breathing.  No hemoptysis.  No dizziness or lightheadedness.  She feels weak and rundown.    She has a history of MTHFR mutation, with history of stroke.  Although she feels like her illness symptoms are improving, she is worried about possible pulmonary embolism given her history.  She is on full-strength aspirin daily.    The following portions of the patient's history were reviewed and updated as appropriate: Allergies, medications, problem list, prior note.     Review of Systems:    Review is otherwise negative except for what is mentioned above.     Social Hx:    Social History     Tobacco Use   Smoking Status Former Smoker     Packs/day: 1.00     Years: 2.00     Pack years: 2.00     Last attempt to quit: 10/27/1974     Years since quittin.1   Smokeless Tobacco Never Used         Objective:     Vitals:    19 1111   BP: 145/84   Pulse: 68   SpO2: 97%   Weight: 187 lb 6 oz (85 kg)       Exam:    General: No apparent distress. Calm. Alert and Oriented X3. Pt behavior is appropriate.  Head:Atraumatic. Normocephalic, non-tender to palpation  Neck: Supple. No JVD. Full ROM. No adenopathy  Eyes: PERRL, No discharge. No strabismus. No nystagmus.  Ears: TMs pearly gray with landmarks visible.   Nose/Mouth/Throat: Patent nares, no oral lesions, pharynx clear and without exudate. Uvula mid-line. Nasal septum mid-line. Clear turbinates.   Lymph: No axillar or cervical adenopathy.   Chest/Lungs: Normal chest wall, clear to auscultation, normal respiratory effort and rate.   Heart/Pulses: Regular  rate and rhythm, strong and equal radial pulses, no murmurs. Capillary refill <2 seconds. No edema.   Abdomen: Soft, no palpable masses. No hepatosplenomegaly, no tenderness with palpation noted. Bowel sounds active in all quadrants. No increased tympany.   Genitalia: Not examined.   Musculoskeletal: No CVA tenderness with palpation. Good ROM with extremities.   Neurologic: Interactive, alert, no focal findings, CNs intact.   Skin: Warm, dry. Normal hair pattern. Free of lesions. Normal skin turgor.       Patient Active Problem List   Diagnosis     Obstructive sleep apnea     Hyperlipidemia     Hypertension     History of stroke     Insomnia     Family history of malignant neoplasm of ovary     MTHFR mutation (H)     Current Outpatient Medications   Medication Sig Dispense Refill     albuterol (PROAIR HFA;PROVENTIL HFA;VENTOLIN HFA) 90 mcg/actuation inhaler Inhale 2 puffs every 6 (six) hours as needed for wheezing. 8.5 g 11     aspirin 325 MG tablet Take 325 mg by mouth at bedtime.        b complex vitamins tablet Take 1 tablet by mouth daily.       baclofen (LIORESAL) 10 MG tablet Take 10 mg by mouth every evening.              cholecalciferol, vitamin D3, (VITAMIN D3) 2,000 unit cap Take 2,000 Units by mouth daily.        docosahexanoic acid/epa (FISH OIL ORAL) Take by mouth.       fluticasone propionate (FLONASE) 50 mcg/actuation nasal spray Apply 1 spray into each nostril daily.       gabapentin (NEURONTIN) 300 MG capsule Take 300 mg by mouth 4 (four) times a day.       magnesium 250 mg Tab Take 5 tablets by mouth daily.       MAGNESIUM CITRATE ORAL Take 400 mg by mouth at bedtime.              melatonin 3 mg Tab tablet Take 3 mg by mouth at bedtime as needed.       multivitamin with minerals (THERA-M) 9 mg iron-400 mcg Tab tablet Take 1 tablet by mouth daily.       omega 3-dha-epa-fish oil 1,200 (144-216) mg cap Take 1,200 mg by mouth daily.        predniSONE (DELTASONE) 20 MG tablet Take 40 mg by mouth daily  for 4 days, THEN 20 mg daily for 4 days, THEN 10 mg daily for 4 days. 14 tablet 0     rosuvastatin (CRESTOR) 20 MG tablet Take 1 tablet (20 mg total) by mouth daily. 90 tablet 3     triamterene-hydrochlorothiazide (DYAZIDE) 37.5-25 mg per capsule Take 1 capsule by mouth daily. 90 capsule 3     vitamin B complex (B COMPLEX ORAL) Take by mouth.       zolpidem (AMBIEN) 5 MG tablet TAKE 1 TABLET BY MOUTH ONCE DAILY AT BEDTIME AS NEEDED FOR SLEEP 90 tablet 0     No current facility-administered medications for this visit.        I spent 25 minutes with patient face to face, of which >50% was counseling regarding the above plan       Maverick Arreola (Rob), LEAH    12/23/2019

## 2021-06-04 NOTE — TELEPHONE ENCOUNTER
RN triage   Call from pt   Pt states she called yesterday -- and went to [facility] yesterday -- pt not happy w/ outcome of [facility] visit   Pt says they did a CXR and told it did not show anything   Pt prescribed levoquin -- and told to watch for achilles tendon pain   Pt took one dose of levoquin = got shooting pains in front of leg -- pain passed   Pt has many concerns -- does not want to take anymore levoquin- due to her age and nerve pain and that she is taking magnesium and still on prednisone   Pt wants PCP advice   1.  Clarify CXR results = does she still have bronchitis?  Does she have a lung blood clot?  2.  Does she need any antibiotic?  Or other medication?  3.  What to do to feel better -- still feeling sick   4.  When should she be seen again?  Brianne Ramirez RN [facility] RN triage      Reason for Disposition    Nursing judgment    Protocols used: NO PROTOCOL AVAILABLE - INFORMATION ONLY-A-[place]

## 2021-06-04 NOTE — TELEPHONE ENCOUNTER
A chest xray cannot determine a blood clot in the lungs nor bronchitis.  Otherwise with the number of her concerns she will need to come in to discuss them

## 2021-06-04 NOTE — TELEPHONE ENCOUNTER
Patient notified of clinician's message and verbalized understanding. No further questions at this time.   Elina Anton CMA ............... 3:18 PM, 12/19/19

## 2021-06-05 VITALS — BODY MASS INDEX: 32.96 KG/M2 | WEIGHT: 192 LBS | DIASTOLIC BLOOD PRESSURE: 78 MMHG | SYSTOLIC BLOOD PRESSURE: 139 MMHG

## 2021-06-05 VITALS
WEIGHT: 191.44 LBS | DIASTOLIC BLOOD PRESSURE: 80 MMHG | OXYGEN SATURATION: 97 % | BODY MASS INDEX: 32.86 KG/M2 | SYSTOLIC BLOOD PRESSURE: 138 MMHG | HEART RATE: 70 BPM

## 2021-06-05 NOTE — PROGRESS NOTES
"History:  Maria Elena is a 69 y.o. year old female who presents with complaints of pain near her left inguinal incision.  She had undergone an inpatient open left inguinal hernia repair on May 2, 2019 for what appeared to be an incarcerated hernia.  She was doing well at her postop appointment.  Since then, she has had increasing discomfort and increasing frequency in the left inguinal region.  She also notes a fullness in the left lower abdominal wall.  It all tends to be worse after she spends more time on her feet.  Yet she also notices worsening in the morning when she gets out of bed.  Usually she can lay down and after about an hour her symptoms have improved.    Exam:  /78 (Patient Site: Right Arm, Patient Position: Sitting, Cuff Size: Adult Regular)   Ht 5' 4\" (1.626 m)   Wt 187 lb (84.8 kg)   Breastfeeding No   BMI 32.10 kg/m    Body mass index is 32.1 kg/m .  General: Alert, cooperative, appears stated age   Lungs: Normal respiratory effort, breath sounds equal bilaterally  Heart: Regular rate and rhythm  Abdomen: Soft and nontender to palpation.  Left inguinal incision has healed well.  Superior and lateral to the incision is a bulge that is easily reducible, consistent with recurrent hernia.    Assessment/Plan:   Maria Elena Sprague is a 69 y.o. female who underwent open left inguinal hernia repair last year.  Unfortunately, it does appear that her hernia has recurred.  It is symptomatic, so I think she would benefit from another repair.  I reviewed my prior notes, she did have a blown out floor and very lax tissues.  Unfortunately, I am not surprised that she recurred based off of what we had to start with.  Ideally, since she failed an open repair with mesh, the next repair would be fixed from a different approach, whether it be laparoscopically or robotically.  Her and her  are heading out of town later this week for the next 2 months, but they are interested in meeting with another surgeon who " can do laparoscopic or robotic repair as soon as possible.      Bonnie Khalil, DO  [facility]  [phone]

## 2021-06-05 NOTE — TELEPHONE ENCOUNTER
Patient is having pain where hernia was. Episodes of pain started late summer but have continued to worsen over the months. Pain is intermittent but becoming more painful and occurring more often. No other symptoms except fatigue after pain episode. Patient has a follow up appointment with Dr. Khalil on Monday and recommended patient get a CT done before appointment and requested that PCP place the order.  Elina Anton CMA ............... 4:30 PM, 01/22/20

## 2021-06-05 NOTE — TELEPHONE ENCOUNTER
If Dr. Khalil wants the scan prior to her appointment, then Dr. Khalil should be placing this order as she knows what she is looking for with the scan and has the proper context for the order

## 2021-06-05 NOTE — TELEPHONE ENCOUNTER
Who is requesting this?  The patient?  Is she having pain at all?  Any other symptoms?  More information is needed, please.

## 2021-06-05 NOTE — PROGRESS NOTES
HPI:  Maria Elena Sprague is a 69 y.o. female who was referred to me by Jeremy Rhodes MD for a recurrent left flank incisional hernia.  She had a repair several months ago in the inguinal space but is noted a recurrent bulge.  She had a recent CT scan which shows the recurrence as well.  She has had pain in that side with intermittent bulging which was reducible.  She denies any fevers, chills, nausea or vomiting.    Allergies:Oxaprozin; Chlorhexidine towelette; Influenza virus vaccine whole; Hydrocodone; and Itraconazole    Past Medical History:   Diagnosis Date     Hyperlipidemia      Obesity      Osteopenia      Stroke (H) 2013    balance issue       Past Surgical History:   Procedure Laterality Date     APPENDECTOMY  2005     big toe rt side Right      INGUINAL HERNIA REPAIR Left 5/2/2019    Procedure: REPAIR, HERNIA, INGUINAL, OPEN;  Surgeon: Bonnie Khalil DO;  Location: [facility];  Service: General     NM REVISE MEDIAN N/CARPAL TUNNEL SURG Right 4/19/2017    Procedure: OPEN RIGHT CARPAL TUNNEL RELEASE WITH FLEXOR SYNOVECTOMY;  Surgeon: Kaushik Rodriguez MD;  Location: [facility];  Service: Hand       CURRENT MEDS:  Current Outpatient Medications   Medication Sig Dispense Refill     albuterol (PROAIR HFA;PROVENTIL HFA;VENTOLIN HFA) 90 mcg/actuation inhaler Inhale 2 puffs every 6 (six) hours as needed for wheezing. 8.5 g 11     aspirin 325 MG tablet Take 325 mg by mouth at bedtime.        b complex vitamins tablet Take 1 tablet by mouth daily.       baclofen (LIORESAL) 10 MG tablet Take 10 mg by mouth every evening.              cholecalciferol, vitamin D3, (VITAMIN D3) 2,000 unit cap Take 2,000 Units by mouth daily.        docosahexanoic acid/epa (FISH OIL ORAL) Take by mouth.       fluticasone propionate (FLONASE) 50 mcg/actuation nasal spray Apply 1 spray into each nostril daily.       gabapentin (NEURONTIN) 300 MG capsule Take 300 mg by mouth 4 (four) times a day.       magnesium  250 mg Tab Take 5 tablets by mouth daily.       MAGNESIUM CITRATE ORAL Take 400 mg by mouth at bedtime.              melatonin 3 mg Tab tablet Take 3 mg by mouth at bedtime as needed.       multivitamin with minerals (THERA-M) 9 mg iron-400 mcg Tab tablet Take 1 tablet by mouth daily.       omega 3-dha-epa-fish oil 1,200 (144-216) mg cap Take 1,200 mg by mouth daily.        rosuvastatin (CRESTOR) 20 MG tablet Take 1 tablet (20 mg total) by mouth daily. 90 tablet 3     triamterene-hydrochlorothiazide (DYAZIDE) 37.5-25 mg per capsule Take 1 capsule by mouth daily. 90 capsule 3     vitamin B complex (B COMPLEX ORAL) Take by mouth.       zolpidem (AMBIEN) 5 MG tablet TAKE 1 TABLET BY MOUTH ONCE DAILY AT BEDTIME AS NEEDED FOR SLEEP 90 tablet 0     No current facility-administered medications for this visit.        Family History   Problem Relation Age of Onset     Ovarian cancer Mother         late 70s     Stroke Mother      Colon cancer Father         late 60s     Prostate cancer Brother 56     Pancreatic cancer Maternal Aunt 82     Stomach cancer Maternal Uncle         80s     Cancer Paternal Uncle         melanoma and bladder in 80s     Skin cancer Maternal Grandmother      Colon cancer Maternal Grandfather         early 80s     Stomach cancer Paternal Grandfather         60s     Stomach cancer Other 81        paternal-maternal great-aunt     Kidney cancer Other         paternal side,  at 57     Colon cancer Other 52        paternal side     Stomach cancer Other         paternal side,  at 54     Leukemia Other         paternal side, late 50s     Breast cancer Other         maternal first-cousin, late 40s     Lymphoma Other 74        paternal side, MALT     Lymphoma Other         paternal side,  at 50     Stomach cancer Other 84        paternal side     Cancer Other         paternal side, breast and stomach cancer     Uterine cancer Other 78        paternal side     Cancer Other 83        paternal side,  prostate and bone cancer     Leukemia Other         paternal side, 80s     Lung cancer Other         paternal side, 90s     Lung cancer Maternal Aunt         80s     Skin cancer Maternal Uncle      Cancer Maternal Uncle         stomach and colon cancer in 60s     Cancer Maternal Uncle         pituitary tumor 50s     Breast cancer Paternal Aunt         Age in 70's     Breast cancer Paternal Aunt         Age in 70's     Stroke Sister      Breast cancer Sister 65        reports that she quit smoking about 45 years ago. She has a 2.00 pack-year smoking history. She has never used smokeless tobacco. She reports current alcohol use of about 6.0 standard drinks of alcohol per week. She reports that she does not use drugs.    Review of Systems -   The 12 point review of systems  is within normal limits except for as mentioned above in the HPI.  General ROS: No complaints or constitutional symptoms  Ophthalmic ROS: No complaints of visual changes  Skin: No complaints or symptoms   Endocrine: No complaints or symptoms  Hematologic/Lymphatic: No symptoms or complaints  Psychiatric: No symptoms or complaints  Respiratory ROS: no cough, shortness of breath, or wheezing  Cardiovascular ROS: no chest pain or dyspnea on exertion  Gastrointestinal ROS: As per HPI  Genito-Urinary ROS: no dysuria, trouble voiding, or hematuria  Musculoskeletal ROS: no joint or muscle pain  Neurological ROS: no TIA or stroke symptoms      /82   There is no height or weight on file to calculate BMI.    EXAM:  GENERAL: Well developed female  HEENT: Extra ocular muscles intact, pupils are round and reactive, sclera is anicteric,   NECK:  No obvious masses or deformities  CARDIAC: RRR w/out murmur   CHEST/LUNG: Clear to auscultation bilaterally  ABDOMEN: Left flank bulge, mild tenderness palpation reducible  NEURO: No obvious defects noted.  EXT: No edema, no obvious deformities or any other abnormalities    IMAGES:   EXAM: CT ABDOMEN PELVIS WO ORAL  W IV CONTRAST  LOCATION: [facility]  DATE/TIME: 9/19/2019 10:40 PM     INDICATION: Abdominal pain. Left lower quadrant pain.  COMPARISON: 5/2/2019.  TECHNIQUE: Helical enhanced thin-section CT scan of the abdomen and pelvis was performed following injection of IV contrast. Multiplanar reformats were obtained. Dose reduction techniques were used.  CONTRAST: Iohexol (Omni) 100 mL     FINDINGS:   LUNG BASES: Calcified mitral annulus. Minimal amount of bilateral lower lung dependent atelectatic stranding.     ABDOMEN: Hepatic cysts again noted. Spleen is normal in size. Minimal splenic artery calcifications. Pancreas normal. Stomach partially filled gastric contents. Gallbladder visualized. No adrenal nodule. Aorta normal in caliber.  Mild atherosclerosis. The lower abdomen demonstrates mild mesenteric swirling similar to prior study.     PELVIS: Bilateral iliac atherosclerosis. Previous appendectomy. Previous left hernia repair. Bladder partially filled. There is mild thickening of the bladder anteriorly and a small amount of adjacent stranding. Phleboliths in pelvis. Uterus and adnexal   regions unremarkable. Trace free fluid in pelvis.     MUSCULOSKELETAL: Thoracolumbar degenerative changes. Facet right as lower lumbar spine.     IMPRESSION:   CONCLUSION:   1.  Previous hernia repair.  2.  There is mild mesenteric swirling similar to prior studies.  3.  No evidence of obstruction.  4.  Recommend correlation to exclude cystitis.  5.  No distal obstructing ureteral calculi.  6.  No CT evidence of diverticulitis.  7.  Previous appendectomy.       Assessment/Plan:    Maria Elena Sprague is a 69 y.o. female with a recurrent left incisional hernia.  The pathophysiology of these hernias was discussed as were the surgical and non-operative management strategies.      The risks of surgery were discussed which include, but are not limited to, bleeding, infection, recurrent hernia, chronic pain, poor cosmesis, blood  clots, stroke, heart attack and death.  Additionally, the risks of observation were discussed which include, but are not limited to, enlargement of the hernia, incarceration, strangulation, pain and death.  Surgical options including open, laparoscopic and robotic hernia repair were discussed in detail.      She understands everything which was discussed and has consented to proceed with surgery.  We will therefore schedule robotic  repair of the hernia accordingly.      Teo Hicks D.O. [facility]  [phone]  [facility] Department of Surgery

## 2021-06-05 NOTE — TELEPHONE ENCOUNTER
Orders being requested:   CT Abdomen and Pelvis without oral with IV contrast    Reason service is needed/diagnosis: Hernia Repair with post abdominal pain.    When are orders needed by:   01/24/2020    Where to send Orders:   [facility]     Okay to leave detailed message?    Yes    Please place CT order and notify patient of the outcome to schedule.

## 2021-06-05 NOTE — TELEPHONE ENCOUNTER
Pt calling with complaint of LLQ pain. She is s/p left open inguinal hernia repair on 5/20/19. She was doing fine for several months after surgery until 9/19/19 when she was seen in the ED. CT of her abdomen was normal at that time. Pt states since then, she has continued to have intermittent left groin pain. She notices it is worse in the morning. State her abdomen feels very hard and bloated. Pt tells me she has had troubles with constipation for years and manages with Miralax. Her bowel movements are soft.     Pt reports the pain has worsened to the point she would like to discuss with Dr. Khalil. She has been scheduled for the next available appointment on 1/27/19, however wanted to update Dr. Khalil with her symptoms. Advised pt I would speak with Dr. Khalil and call her back as soon as I am able.

## 2021-06-05 NOTE — PROGRESS NOTES
Preoperative Exam    Scheduled Procedure: incisional hernia repair  Surgery Date:  2/13/2020  Surgery Location: [facility], fax [phone]    Surgeon:  Dr. Hicks    Assessment/Plan:     1. Pre-op exam    - Electrocardiogram Perform and Read  - Basic Metabolic Panel    2. Incisional hernia, without obstruction or gangrene    - Basic Metabolic Panel     3. Hypertension, stable    4.  POLLY, stable    5.  HL, stable    Surgical Procedure Risk: Intermediate (reported cardiac risk generally 1-5%)  Have you had prior anesthesia?: Yes  Have you or any family members had a previous anesthesia reaction:  No  Do you or any family members have a history of a clotting or bleeding disorder?: Yes:  MTHFR mutation  Cardiac Risk Assessment: no increased risk for major cardiac complications    APPROVAL GIVEN to proceed with proposed procedure, without further diagnostic evaluation.  No aspirin/NSAIDs/fish oil 7 days prior to the procedure.     Please Note:  Patient uses a CPAP machine.    Functional Status: Independent  Patient plans to recover at home with family.     Subjective:      Maria Elena Sprague is a 69 y.o. female who presents for a preoperative consultation.  Maria Elena has a field incisional hernia repair that will need to undergo revision for definitive treatment.  She is feeling well today.  Blood pressure is under excellent control.  Lipids were last checked in October last year and were excellent.  She does have a history of sleep apnea and wears a CPAP on a regular basis.    All other systems reviewed and are negative, other than those listed in the HPI.    Pertinent History  Do you have difficulty breathing or chest pain after walking up a flight of stairs: No  History of obstructive sleep apnea: Yes: noted  Steroid use in the last 6 months: Yes: noted  Frequent Aspirin/NSAID use: Yes: noted  Prior Blood Transfusion: No  Prior Blood Transfusion Reaction: No  If for some reason prior to, during or after the  procedure, if it is medically indicated, would you be willing to have a blood transfusion?:  There is no transfusion refusal.    Current Outpatient Medications   Medication Sig Dispense Refill     albuterol (PROAIR HFA;PROVENTIL HFA;VENTOLIN HFA) 90 mcg/actuation inhaler Inhale 2 puffs every 6 (six) hours as needed for wheezing. 8.5 g 11     aspirin 325 MG tablet Take 325 mg by mouth at bedtime.        b complex vitamins tablet Take 1 tablet by mouth daily.       baclofen (LIORESAL) 10 MG tablet Take 10 mg by mouth every evening.              cholecalciferol, vitamin D3, (VITAMIN D3) 2,000 unit cap Take 2,000 Units by mouth daily.        docosahexanoic acid/epa (FISH OIL ORAL) Take by mouth.       fluticasone propionate (FLONASE) 50 mcg/actuation nasal spray Apply 1 spray into each nostril daily.       gabapentin (NEURONTIN) 300 MG capsule Take 300 mg by mouth 4 (four) times a day.       magnesium 250 mg Tab Take 5 tablets by mouth daily.       MAGNESIUM CITRATE ORAL Take 400 mg by mouth at bedtime.              melatonin 3 mg Tab tablet Take 3 mg by mouth at bedtime as needed.       multivitamin with minerals (THERA-M) 9 mg iron-400 mcg Tab tablet Take 1 tablet by mouth daily.       omega 3-dha-epa-fish oil 1,200 (144-216) mg cap Take 1,200 mg by mouth daily.        rosuvastatin (CRESTOR) 20 MG tablet Take 1 tablet (20 mg total) by mouth daily. 90 tablet 3     triamterene-hydrochlorothiazide (DYAZIDE) 37.5-25 mg per capsule Take 1 capsule by mouth daily. 90 capsule 3     vitamin B complex (B COMPLEX ORAL) Take by mouth.       zolpidem (AMBIEN) 5 MG tablet TAKE 1 TABLET BY MOUTH ONCE DAILY AT BEDTIME AS NEEDED FOR SLEEP 90 tablet 0     No current facility-administered medications for this visit.         Allergies   Allergen Reactions     Oxaprozin Swelling     Hands and feet, itching (daypro), trouble breathing     Chlorhexidine Towelette Itching     Patient started itching all over after straight cath performed and  used Chlorhexidine swabs.       Influenza Virus Vaccine Whole Dizziness     Hydrocodone Itching     Per pt she had reaction when she took this meds the second time.     Itraconazole Hives and Itching       Patient Active Problem List   Diagnosis     Obstructive sleep apnea     Hyperlipidemia     Hypertension     History of stroke     Insomnia     Family history of malignant neoplasm of ovary     MTHFR mutation (H)     Incisional hernia, without obstruction or gangrene       Past Medical History:   Diagnosis Date     Hyperlipidemia      Obesity      Osteopenia      Stroke (H)     balance issue       Past Surgical History:   Procedure Laterality Date     APPENDECTOMY       big toe rt side Right      INGUINAL HERNIA REPAIR Left 2019    Procedure: REPAIR, HERNIA, INGUINAL, OPEN;  Surgeon: Bonnie Khalil DO;  Location: [facility];  Service: General     IL REVISE MEDIAN N/CARPAL TUNNEL SURG Right 2017    Procedure: OPEN RIGHT CARPAL TUNNEL RELEASE WITH FLEXOR SYNOVECTOMY;  Surgeon: Kaushik Rodriguez MD;  Location: [facility];  Service: Hand       Social History     Socioeconomic History     Marital status:      Spouse name: Not on file     Number of children: Not on file     Years of education: Not on file     Highest education level: Not on file   Occupational History     Not on file   Social Needs     Financial resource strain: Not on file     Food insecurity:     Worry: Not on file     Inability: Not on file     Transportation needs:     Medical: Not on file     Non-medical: Not on file   Tobacco Use     Smoking status: Former Smoker     Packs/day: 1.00     Years: 2.00     Pack years: 2.00     Last attempt to quit: 10/27/1974     Years since quittin.3     Smokeless tobacco: Never Used   Substance and Sexual Activity     Alcohol use: Yes     Alcohol/week: 6.0 standard drinks     Types: 6 Glasses of wine per week     Drug use: No     Sexual activity: Not on file    Lifestyle     Physical activity:     Days per week: Not on file     Minutes per session: Not on file     Stress: Not on file   Relationships     Social connections:     Talks on phone: Not on file     Gets together: Not on file     Attends Church service: Not on file     Active member of club or organization: Not on file     Attends meetings of clubs or organizations: Not on file     Relationship status: Not on file     Intimate partner violence:     Fear of current or ex partner: Not on file     Emotionally abused: Not on file     Physically abused: Not on file     Forced sexual activity: Not on file   Other Topics Concern     Not on file   Social History Narrative     40+ years. Several grown kids and grandkids       Patient Care Team:  Jeremy Rhodes MD as PCP - General (Internal Medicine)  Jeremy Rhodes MD as Assigned PCP          Objective:     There were no vitals filed for this visit.      Physical Exam:  OBJECTIVE:    In general the patient is alert pleasant and in no acute distress.    HEENT: Pupils equal round reactive light.  Oropharynx is clear.  Lymphatic shows no anterior posterior cervical lymphadenopathy.  No thyromegaly or other masses noted.    Cardiovascular: S1-S2 regular in rhythm no murmurs gallops rubs    Lungs are clear    Abdomen is soft nontender nondistended.  No HSM.    Extremities show no pedal edema present bilaterally.  DP pulses are 2+ and normal bilaterally.    Skin exam shows no concerning skin lesions.    There are no Patient Instructions on file for this visit.    EKG was ordered and personally reviewed by me today.  Normal sinus rhythm, no ST or T wave changes.    Labs:  BMP pending today.    Immunization History   Administered Date(s) Administered     DT (pediatric) 07/01/2002     Pneumo Conj 13-V (2010&after) 06/03/2015     Pneumo Polysac 23-V 08/17/2016     Td, adult adsorbed, PF 10/31/2019     Tdap 12/16/2009     ZOSTER, LIVE 12/20/2010            Electronically signed by Jeremy Rhodes MD 02/03/20 1:27 PM

## 2021-06-05 NOTE — PATIENT INSTRUCTIONS - HE
Hernia education & surgery packet provided to pt.    HAN Carranza [facility]  P: [phone] I F: [phone]

## 2021-06-06 NOTE — ANESTHESIA PREPROCEDURE EVALUATION
Anesthesia Evaluation      Patient summary reviewed   No history of anesthetic complications     Airway   Mallampati: II  Neck ROM: full   Pulmonary - normal exam    breath sounds clear to auscultation  (+) sleep apnea on CPAP, severe, a smoker  (-) shortness of breath                         Cardiovascular - normal exam  Exercise tolerance: > or = 4 METS  (+) hypertension, , hypercholesterolemia,     (-) angina  ECG reviewed  Rhythm: regular  Rate: normal,         Neuro/Psych    (+) CVA ,     Endo/Other    (+) obesity,   (-) not pregnant     Comments: Malignant neoplasm of ovary  MTHFR mutation    GI/Hepatic/Renal      Comments: Incisional hernia          Dental - normal exam                        Anesthesia Plan  Planned anesthetic: general endotracheal    ASA 3   Induction: intravenous   Anesthetic plan and risks discussed with: patient and spouse  Anesthesia plan special considerations: antiemetics,   Post-op plan: other (POLLY orders)

## 2021-06-06 NOTE — PROGRESS NOTES
Maria Elena Sprague is status post robotic incisional hernia repair. She is doing well with no post operative incisional pain.  She is tolerating a regular diet, ambulating with minimal pain, denies any recurrent bulges and has no other complaints at present.     EXAM:  There were no vitals taken for this visit.  GENERAL: Well developed female, No acute distress, pleasant and conversant   EYES: Pupils equal, round and reactive, no scleral icterus  ABDOMEN: Well-healed port site incisions  SKIN: Pink, warm and dry, no obvious rashes or lesions   NEURO:No focal deficits, ambulatory  MUSCULOSKELETAL:No obvious deformities, no swelling, normal appearing      ASSESSMENT AND PLAN:  Maria Elena Sprague is doing well postoperatively.  She may continue with the recommended diet and light activities as tolerated. I have encouraged her to refrain from any heavy lifting over 20 pounds and strenuous activities or stretching for a total of 3 weeks from her surgery.   She may now follow up on a prn basis if she has any other questions or concerns.     Teo Hicks D.O. [facility]  [phone]  [facility] Department of Surgery

## 2021-06-06 NOTE — ANESTHESIA POSTPROCEDURE EVALUATION
Patient: Maria Elena Sprague  Procedure(s):  HERNIORRHAPHY, INCISIONAL, ROBOT-ASSISTED, LAPAROSCOPIC, USING DA ANGEL XI (Left)  Anesthesia type: general    Patient location: PACU and phase II  Last vitals:   Vitals Value Taken Time   /68 2/13/2020  4:30 PM   Temp 36.5  C (97.7  F) 2/13/2020  3:50 PM   Pulse 89 2/13/2020  4:42 PM   Resp 16 2/13/2020  4:30 PM   SpO2 95 % 2/13/2020  4:42 PM   Vitals shown include unvalidated device data.  Post vital signs: stable  Level of consciousness: awake and responds to simple questions  Post-anesthesia pain: pain controlled  Post-anesthesia nausea and vomiting: no  Pulmonary: unassisted, return to baseline  Cardiovascular: stable and blood pressure at baseline  Hydration: adequate  Anesthetic events: no    QCDR Measures:  ASA# 11 - Jeannette-op Cardiac Arrest: ASA11B - Patient did NOT experience unanticipated cardiac arrest  ASA# 12 - Jeannette-op Mortality Rate: ASA12B - Patient did NOT die  ASA# 13 - PACU Re-Intubation Rate: ASA13B - Patient did NOT require a new airway mgmt  ASA# 10 - Composite Anes Safety: ASA10A - No serious adverse event    Additional Notes:

## 2021-06-06 NOTE — ANESTHESIA CARE TRANSFER NOTE
Last vitals:   Vitals:    02/13/20 1511   BP: 130/61   Pulse: (!) 108   Resp: 16   Temp: 37.1  C (98.7  F)   SpO2: 100%     Patient spontaneous RR, -400s, suctioned, following commands, extubated to facemask 10LPM, O2 sats 100%. VSS. Report to RN.    Patient's level of consciousness is drowsy  Spontaneous respirations: yes  Maintains airway independently: yes  Dentition unchanged: yes  Oropharynx: oropharynx clear of all foreign objects    QCDR Measures:  ASA# 20 - Surgical Safety Checklist: WHO surgical safety checklist completed prior to induction    PQRS# 430 - Adult PONV Prevention: 4558F - Pt received => 2 anti-emetic agents (different classes) preop & intraop  ASA# 8 - Peds PONV Prevention: NA - Not pediatric patient, not GA or 2 or more risk factors NOT present  PQRS# 424 - Jeannette-op Temp Management: 4559F - At least one body temp DOCUMENTED => 35.5C or 95.9F within required timeframe  PQRS# 426 - PACU Transfer Protocol: - Transfer of care checklist used  ASA# 14 - Acute Post-op Pain: ASA14B - Patient did NOT experience pain >= 7 out of 10

## 2021-06-07 NOTE — TELEPHONE ENCOUNTER
Controlled Substance Refill Request  Medication Name:   Requested Prescriptions     Pending Prescriptions Disp Refills     zolpidem (AMBIEN) 5 MG tablet [Pharmacy Med Name: Zolpidem Tartrate 5 MG Oral Tablet] 90 tablet 0     Sig: TAKE 1 TABLET BY MOUTH AT BEDTIME AS NEEDED FOR SLEEP     Date Last Fill: 12/30/19  Requested Pharmacy: [facility]  Submit electronically to pharmacy  Controlled Substance Agreement on file:   Encounter-Level CSA Scan Date - 10/23/2018:    Scan on 10/26/2018 11:21 AM           Encounter-Level CSA Scan Date - 10/18/2017:    Scan on 10/20/2017  2:39 PM           Encounter-Level CSA Scan Date - 08/17/2016:    Scan on 8/17/2016: HE        Last office visit:  2/3/20

## 2021-06-08 ENCOUNTER — COMMUNICATION - HEALTHEAST (OUTPATIENT)
Dept: INTERNAL MEDICINE | Facility: CLINIC | Age: 71
End: 2021-06-08

## 2021-06-08 DIAGNOSIS — G47.00 INSOMNIA: ICD-10-CM

## 2021-06-09 NOTE — PROGRESS NOTES
Assessment/Plan:      Visit for Preoperative Exam.     Patient approved for planned procedure.  We discussed medication.    Patient Instructions   I agree with staying on aspirin.  Stop fish oil one week before surgery.  No triamterine-hctz on the morning of surgery.  Take care!        Subjective:     Scheduled Procedure: Carpal tunnel surgery on Rt hand  Surgery Date:  4/16/17  Surgery Location:  [facility]   Surgeon:  Dr. Rodriguez    ASSESSED PROBLEMS:  Problem List Items Addressed This Visit     Obstructive Sleep Apnea    Hyperlipidemia    Hypertension    Relevant Orders    Basic Metabolic Panel    Stroke Syndrome      Other Visit Diagnoses     Pre-op examination    -  Primary    Relevant Orders    Electrocardiogram Perform and Read (Completed)    Carpal tunnel syndrome              CHIEF COMPLAINT:  Chief Complaint   Patient presents with     Pre-op Exam       HISTORY OF PRESENT ILLNESS:  Maria Elena Sprague is a 66 y.o. female here for an internal medicine pre-operative consultation. The exam is requested by Dr. Rodriguez  in preparation for Carpal Tunnel Surgery RT Hand to be performed at [facility]  on 4/16/17. Today s examination on 4/6/17 is done to review the underlying surgical condition of Carpal Tunnel, clear for anesthesia, and review medical problems with appropriate changes in medications.    Her stroke was on October 1st of 2013. She is having the procedure done at [facility] for that reason. She was told that she should stay on the aspirin due to the clotting disorder. She is suffering from a cold with a sore throat, dry cough, and nasal congestion. She has had pneumonia before so she is keeping a close watch on the cold.    Maria Elena Sprague has tolerated previous surgeries well without bleeding or anesthesia difficulty.   .............................................................................................................................................  Current Outpatient Prescriptions    Medication Sig Dispense Refill     albuterol (PROVENTIL HFA;VENTOLIN HFA) 90 mcg/actuation inhaler Inhale 2 puffs every 4 (four) hours as needed for wheezing or shortness of breath. 1 Inhaler 1     albuterol (PROVENTIL HFA;VENTOLIN HFA) 90 mcg/actuation inhaler Inhale 2 puffs every 6 (six) hours as needed for wheezing. 8.5 g 11     aspirin 325 MG tablet Take 325 mg by mouth daily.       b complex vitamins tablet Take 1 tablet by mouth daily.       baclofen (LIORESAL) 10 MG tablet Take 10 mg by mouth bedtime.       calcium carbonate (OS-TEREZA) 600 mg (1,500 mg) tablet Take 600 mg by mouth daily.       cholecalciferol, vitamin D3, (VITAMIN D3) 2,000 unit cap Take by mouth.       gabapentin (NEURONTIN) 300 MG capsule Take 300 mg by mouth 4 (four) times a day.       inhalational spacing device Spcr Use with each use of the inhaler. 1 each 0     multivitamin with minerals (THERA-M) 9 mg iron-400 mcg Tab tablet Take 1 tablet by mouth daily.       omega 3-dha-epa-fish oil 1,200 (144-216) mg cap Take by mouth.       simvastatin (ZOCOR) 20 MG tablet Take 1 tablet (20 mg total) by mouth daily. 90 tablet 3     triamterene-hydrochlorothiazide (DYAZIDE) 37.5-25 mg per capsule TAKE 1 CAPSULE BY MOUTH DAILY. 90 capsule 3     tryptophan 500 mg Tab Take by mouth.       zolpidem (AMBIEN) 5 MG tablet TAKE 1 TABLET (5 MG TOTAL) BY MOUTH BEDTIME AS NEEDED FOR SLEEP. 135 tablet 0     budesonide-formoterol (SYMBICORT) 160-4.5 mcg/actuation inhaler Inhale 2 puffs 2 (two) times a day. 1 Inhaler 11     No current facility-administered medications for this visit.        Allergies   Allergen Reactions     Influenza Virus Vaccine Whole      Oxaprozin        Immunization History   Administered Date(s) Administered     DT (pediatric) 07/01/2002     Pneumo Conj 13-V (2010&after) 06/03/2015     Pneumo Polysac 23-V 08/17/2016     Tdap 12/16/2009     ZOSTER 12/20/2010       Patient Active Problem List   Diagnosis     Obstructive Sleep Apnea      Osteopenia     Hyperlipidemia     Hypertension     Stroke Syndrome     Insomnia     Obesity       Past Medical History:   Diagnosis Date     Hyperlipidemia      MTHFR mutation     per patient report, tested after stroke in 2013     Stroke        Social History     Social History     Marital status:      Spouse name: N/A     Number of children: N/A     Years of education: N/A     Occupational History     Not on file.     Social History Main Topics     Smoking status: Former Smoker     Quit date: 10/27/1974     Smokeless tobacco: Never Used     Alcohol use 3.6 oz/week     6 Glasses of wine per week     Drug use: Not on file     Sexual activity: Not on file     Other Topics Concern     Not on file     Social History Narrative       Past Surgical History:   Procedure Laterality Date     big toe rt side Right      COLONOSCOPY  2009     [place] APPENDECTOMY      Description: Appendectomy;  Proc Date: 03/01/2009;         History of Present Illness  Recent Health  Fever: no  Chills: no  Fatigue: no  Chest Pain: no  Cough: yes  Dyspnea: no  Urinary Frequency: no  Nausea: no  Vomiting: no  Diarrhea: no  Abdominal Pain: no  Easy Bruising: no  Lower Extremity Swelling: no  Poor Exercise Tolerance: no    Most recent Health Maintenance Visit:  8 month(s) ago    Pertinent History  Prior Anesthesia: yes  Previous Anesthesia Reaction:  no  Diabetes: no  Cardiovascular Disease: no  Pulmonary Disease: no  Renal Disease: no  GI Disease: no  Sleep Apnea: yes, pt uses cpap machine at home  Thromboembolic Problems: no  Clotting Disorder: yes, MTHFR  Bleeding Disorder: no  Transfusion Reaction: no  Impaired Immunity: no  Steroid use in the last 6 months: no  Frequent Aspirin use: yes, instructed not to stop because of clotting disorder    Family history of anesthesia reaction, Stroke and clotting disorder    Social history of patient does not wear denture or partial plates, there is no transfusion refusal and there are no concerns  regarding care after surgery    After surgery, the patient plans to recover at home with family.    Review of Systems  She denies fevers, chest pain, and chills. She pulled something in her right shoulder and since then it has been painful, but the ROM in her right shoulder is normal. Pertinent items are noted in HPI. A 12 point comprehensive review of systems was negative except as noted.      Objective:      Vitals:    04/06/17 1042   BP: 142/82   Pulse: 69   SpO2: 98%        Physical Exam:  General Appearance: Alert, cooperative, no distress, appears stated age   Head: Normocephalic, without obvious abnormality, atraumatic   Eyes: PERRL, conjunctiva/corneas clear, EOM's intact   Throat: Lips, mucosa, and tongue normal; teeth and gums normal, cobblestoning   Neck: Normal ROM, no carotid bruits, no thyromegaly   Lungs: Clear to auscultation bilaterally, respirations unlabored.   Heart: Regular rate and rhythm, S1 and S2 normal, no murmur, rub, or gallop,   Abdomen: Soft, non-tender, bowel sounds active all four quadrants, no masses, no organomegaly   Musculoskeletal: right bicep muscles mildly tender to palpation  Extremities: Extremities normal, atraumatic, no cyanosis or edema  Skin: Skin color, texture, turgor normal, no rashes or lesions   Neurologic: Normal     EKG from 4/6/17: Normal sinus, rate 66     ADDITIONAL HISTORY SUMMARIZED (2): None.  DECISION TO OBTAIN EXTRA INFORMATION (1): None.   RADIOLOGY TESTS (1): None.  LABS (1): Ordered labs.   MEDICINE TESTS (1): Ordered EKG  INDEPENDENT REVIEW (2 each): Reviewed EKG from 4/6/17.     The visit lasted a total of 8 minutes face to face with the patient. Over 50% of the time was spent counseling and educating the patient about Carpal Tunnel Surgery.    I, Cate Perez, am scribing for and in the presence of, Dr. Zee Rhodes.    I, Dr. Zee Rhodes, personally performed the services described in this documentation, as scribed by Cate Perez in my  presence, and it is both accurate and complete.    Total Points: 4

## 2021-06-10 NOTE — ANESTHESIA CARE TRANSFER NOTE
Last vitals:   Vitals:    04/19/17 0829   BP: 136/63   Pulse: 96   Resp: 16   Temp: 36.6  C (97.9  F)   SpO2: 94%     Patient's level of consciousness is awake  Spontaneous respirations: yes  Maintains airway independently: yes  Dentition unchanged: yes  Oropharynx: oropharynx clear of all foreign objects    QCDR Measures:  ASA# 20 - Surgical Safety Checklist: ASA20A - Safety Checks Done  PQRS# 430 - Adult PONV Prevention: NA - Not adult patient, not GA or 3 or more risk factors NOT present  ASA# 8 - Peds PONV Prevention: NA - Not pediatric patient, not GA or 2 or more risk factors NOT present  PQRS# 424 - Jeannette-op Temp Management: 4559F - At least one body temp DOCUMENTED => 35.5C or 95.9F within required timeframe  PQRS# 426 - PACU Transfer Protocol:NA - Patient did not go to PACU  ASA# 14 - Acute Post-op Pain: ASA14B - Patient did NOT experience pain >= 7 out of 10    I completed my SBAR handoff to the receiving nurse per policy and procedure.

## 2021-06-10 NOTE — TELEPHONE ENCOUNTER
Routing to Dr. Tinoco who is covering for Dr. Rhodes.  Elina Anton [facility] ............... 9:04 AM, 08/19/20

## 2021-06-10 NOTE — TELEPHONE ENCOUNTER
Who is calling:  Patient   Reason for Call:  Caller is needing to know if medication can get sent over as soon as possible since she is leaving out of town tomorrow. Caller would really like to pick it up before she leaves.   Date of last appointment with primary care:   Okay to leave a detailed message: Yes

## 2021-06-10 NOTE — TELEPHONE ENCOUNTER
Controlled Substance Refill Request  Medication Name:   Requested Prescriptions     Pending Prescriptions Disp Refills     zolpidem (AMBIEN) 5 MG tablet [Pharmacy Med Name: Zolpidem Tartrate 5 MG Oral Tablet] 90 tablet 0     Sig: TAKE 1 TABLET BY MOUTH ONCE DAILY AT BEDTIME AS NEEDED FOR SLEEP     Date Last Fill: 4/8/20  Requested Pharmacy: [facility]  Submit electronically to pharmacy  Controlled Substance Agreement on file:   Encounter-Level CSA Scan Date - 10/23/2018:    Scan on 10/26/2018 11:21 AM           Encounter-Level CSA Scan Date - 10/18/2017:    Scan on 10/20/2017  2:39 PM           Encounter-Level CSA Scan Date - 08/17/2016:    Scan on 8/17/2016: HE        Last office visit:  1/29/20

## 2021-06-10 NOTE — ANESTHESIA PREPROCEDURE EVALUATION
Anesthesia Evaluation        Airway   Mallampati: II  Neck ROM: full   Pulmonary - normal exam   (+) shortness of breath (with exertion), sleep apnea on CPAP, ,                          Cardiovascular - normal exam  (+) hypertension, , hypercholesterolemia,      Neuro/Psych    (+) CVA (2013.  No residual.) ,     Endo/Other    (+) obesity,      GI/Hepatic/Renal - negative ROS      Other findings: Cervical disc disease.      Dental - normal exam                        Anesthesia Plan  Planned anesthetic: MAC    ASA 2   Induction: intravenous   Anesthetic plan and risks discussed with: patient and spouse    Post-op plan: routine recovery

## 2021-06-10 NOTE — ANESTHESIA POSTPROCEDURE EVALUATION
Patient: Maria Elena Sprague  OPEN RIGHT CARPAL TUNNEL RELEASE WITH FLEXOR SYNOVECTOMY  Anesthesia type: MAC    Patient location: Phase II Recovery  Last vitals:   Vitals:    04/19/17 0850   BP: 143/67   Pulse: 70   Resp:    Temp:    SpO2: 95%     Post vital signs: stable  Level of consciousness: awake and responds to simple questions  Post-anesthesia pain: pain controlled  Post-anesthesia nausea and vomiting: no  Pulmonary: unassisted, return to baseline  Cardiovascular: stable and blood pressure at baseline  Hydration: adequate  Anesthetic events: no    QCDR Measures:  ASA# 11 - Jeannette-op Cardiac Arrest: ASA11B - Patient did NOT experience unanticipated cardiac arrest  ASA# 12 - Jeannette-op Mortality Rate: ASA12B - Patient did NOT die  ASA# 13 - PACU Re-Intubation Rate: NA - No ETT / LMA used for case  ASA# 10 - Composite Anes Safety: ASA10A - No serious adverse event  ASA# 38 - New Corneal Injury: ASA38A - No new exposure keratitis or corneal abrasion in PACU    Additional Notes:

## 2021-06-11 NOTE — PROGRESS NOTES
[facility]  [address] [address]  Dept: [phone]  Dept Fax: [phone]  Primary Provider: Jeremy Rhodes MD  Pre-op Performing Provider: JEREMY RHODES    PREOPERATIVE EVALUATION:  Today's date: 9/17/2020    Maria Elena Sprague is a 70 y.o. female who presents for a preoperative evaluation.    Surgical Information:  Surgery Details 9/17/2020   Surgery/Procedure: caratact surgery   Surgery Location: [facility]   Surgeon: Dr Howard   Surgery Date: 9/30, 10/14   Time of Surgery: na   Where patient plans to recover: at home with family     Type of Anesthesia Anticipated: Local with MAC    Subjective     HPI related to upcoming procedure: Maria Elena has bilateral cataracts and is going to undergo cataract extraction for definitive treatment.  She is otherwise feeling well today.  History of hypertension which is currently at goal.  Also a history of obstructive sleep apnea.    Preop Questions 9/17/2020   Have you ever had a heart attack or stroke? YES -    Have you ever had surgery on your heart or blood vessels, such as a stent placement, a coronary artery bypass, or surgery on an artery in your head, neck, heart, or legs? No   Do you have chest pain with activity? No   Do you have a history of  heart failure? No   Do you currently have a cold, bronchitis or symptoms of other infection? No   Do you have a cough, shortness of breath, or wheezing? No   Do you or anyone in your family have previous history of blood clots? YES -    Do you or does anyone in your family have a serious bleeding problem such as prolonged bleeding following surgeries or cuts? No   Have you ever had problems with anemia or been told to take iron pills? No   Have you had any abnormal blood loss such as black, tarry or bloody stools, or abnormal vaginal bleeding? No   Have you ever had a blood transfusion? No   Are you willing to have a blood transfusion if it is medically needed  before, during, or after your surgery? Yes   Have you or any of your relatives ever had problems with anesthesia? No   Do you have sleep apnea, excessive snoring or daytime drowsiness? YES -    Do you have a CPAP machine? Yes   Do you have any artifical heart valves or other implanted medical devices like a pacemaker, defibrillator, or continuous glucose monitor? No   Do you have artificial joints? YES -    Are you allergic to latex? No   Is there any chance that you may be pregnant? No       Review of Systems  CONSTITUTIONAL: NEGATIVE for fever, chills, change in weight  INTEGUMENTARY/SKIN: NEGATIVE for worrisome rashes, moles or lesions  EYES: NEGATIVE for vision changes or irritation  ENT/MOUTH: NEGATIVE for ear, mouth and throat problems  RESP: NEGATIVE for significant cough or SOB  BREAST: NEGATIVE for masses, tenderness or discharge  CV: NEGATIVE for chest pain, palpitations or peripheral edema  GI: NEGATIVE for nausea, abdominal pain, heartburn, or change in bowel habits  : NEGATIVE for frequency, dysuria, or hematuria  MUSCULOSKELETAL: NEGATIVE for significant arthralgias or myalgia  NEURO: NEGATIVE for weakness, dizziness or paresthesias  ENDOCRINE: NEGATIVE for temperature intolerance, skin/hair changes  HEME: NEGATIVE for bleeding problems  PSYCHIATRIC: NEGATIVE for changes in mood or affect    Patient Active Problem List    Diagnosis Date Noted     Incisional hernia, without obstruction or gangrene 01/29/2020     MTHFR mutation (H) 12/23/2019     Family history of malignant neoplasm of ovary 10/31/2019     Obstructive sleep apnea      Hyperlipidemia      Hypertension      History of stroke      Insomnia      Past Medical History:   Diagnosis Date     Hyperlipidemia      Hypertension      Obesity      Osteopenia      Sleep apnea     uses CPAP     Stroke (H) 2013    balance issue, pretty much resolved     Past Surgical History:   Procedure Laterality Date     APPENDECTOMY  2005     big toe rt side Right       INGUINAL HERNIA REPAIR Left 5/2/2019    Procedure: REPAIR, HERNIA, INGUINAL, OPEN;  Surgeon: Bonnie Khalil DO;  Location: Health system;  Service: General     OH REVISE MEDIAN N/CARPAL TUNNEL SURG Right 4/19/2017    Procedure: OPEN RIGHT CARPAL TUNNEL RELEASE WITH FLEXOR SYNOVECTOMY;  Surgeon: Kaushik Rodriguez MD;  Location: Health system;  Service: Hand     Current Outpatient Medications   Medication Sig Dispense Refill     albuterol (PROAIR HFA;PROVENTIL HFA;VENTOLIN HFA) 90 mcg/actuation inhaler Inhale 2 puffs every 6 (six) hours as needed for wheezing. 8.5 g 11     aspirin 325 MG tablet Take 325 mg by mouth at bedtime.        b complex vitamins tablet Take 1 tablet by mouth daily.       baclofen (LIORESAL) 10 MG tablet Take 10 mg by mouth every evening.              cholecalciferol, vitamin D3, (VITAMIN D3) 2,000 unit cap Take 2,000 Units by mouth every evening.        COQ10, UBIQUINOL, ORAL Take 1 capsule by mouth daily.       fluticasone propionate (FLONASE) 50 mcg/actuation nasal spray Apply 1 spray into each nostril daily as needed.        gabapentin (NEURONTIN) 300 MG capsule Take 300 mg by mouth 4 (four) times a day.       magnesium oxide 200 mg magnesium tablet Take 400 mg by mouth 2 (two) times a day. Pt taking 800mg every morning, and 1200mg every evening.       melatonin 3 mg Tab tablet Take 3 mg by mouth at bedtime as needed.       multivitamin with minerals (THERA-M) 9 mg iron-400 mcg Tab tablet Take 1 tablet by mouth daily.       omega 3-dha-epa-fish oil 1,200 (144-216) mg cap Take 1,200 mg by mouth daily.        rosuvastatin (CRESTOR) 20 MG tablet Take 1 tablet (20 mg total) by mouth daily. 90 tablet 3     triamterene-hydrochlorothiazide (DYAZIDE) 37.5-25 mg per capsule Take 1 capsule by mouth daily. 90 capsule 3     zolpidem (AMBIEN) 5 MG tablet TAKE 1 TABLET BY MOUTH ONCE DAILY AT BEDTIME AS NEEDED FOR SLEEP 30 tablet 0     No current facility-administered medications for  this visit.        Allergies   Allergen Reactions     Oxaprozin Swelling     Hands and feet, itching (daypro), trouble breathing     Hydrocodone Itching     Per pt she had reaction when she took this meds the second time.     Influenza Virus Vaccine Whole Dizziness     Itraconazole Hives and Itching       Social History     Tobacco Use     Smoking status: Former Smoker     Packs/day: 1.00     Years: 2.00     Pack years: 2.00     Last attempt to quit: 10/27/1974     Years since quittin.9     Smokeless tobacco: Never Used   Substance Use Topics     Alcohol use: Yes     Alcohol/week: 6.0 standard drinks     Types: 6 Glasses of wine per week        Social History     Substance and Sexual Activity   Drug Use No           Objective   /78   Wt 192 lb (87.1 kg)   BMI 32.96 kg/m    Physical Exam    GENERAL APPEARANCE: healthy, alert and no distress     EYES: EOMI, PERRL     HENT: ear canals and TM's normal and nose and mouth without ulcers or lesions     NECK: no adenopathy, no asymmetry, masses, or scars and thyroid normal to palpation     RESP: lungs clear to auscultation - no rales, rhonchi or wheezes     BREAST: normal without masses, tenderness or nipple discharge and no palpable axillary masses or adenopathy     CV: regular rates and rhythm, normal S1 S2, no S3 or S4 and no murmur, click or rub     ABDOMEN:  soft, nontender, no HSM or masses and bowel sounds normal     MS: extremities normal- no gross deformities noted, no evidence of inflammation in joints, FROM in all extremities.     SKIN: no suspicious lesions or rashes     NEURO: Normal strength and tone, sensory exam grossly normal, mentation intact and speech normal     PSYCH: mentation appears normal. and affect normal/bright     LYMPHATICS: No cervical adenopathy    Recent Labs   Lab Test 20  1429 10/31/19  1133 19  2059 19  1534   HGB  --   --  13.0 13.9   PLT  --   --  238 254   INR  --   --   --  0.91    141 140 139   K  4.1 4.2 3.7 3.8   CREATININE 0.83 0.64 0.66 0.75        PRE-OP Diagnostics:   No labs were ordered during this visit.  No EKG required for low risk surgery (cataract, skin procedure, breast biopsy, etc).         Assessment & Plan       The proposed surgical procedure is considered LOW risk.    REVISED CARDIAC RISK INDEX   The patient has the following serious cardiovascular risks for perioperative complications:  No serious cardiac risks = 0 points    INTERPRETATION: 0 points: Class I (very low risk - 0.4% complication rate)      ICD-10-CM    1. Preop general physical exam  Z01.818    2. Hypertension  I10    3. Cataract of both eyes, unspecified cataract type  H26.9        The patient has the following additional risks and recommendations for perioperative complications:     - No identified additional risk factors other than previously addressed     MEDICATION INSTRUCTIONS:  Patient is to take all scheduled medications on the day of surgery    RECOMMENDATION:  APPROVAL GIVEN to proceed with proposed procedure, without further diagnostic evaluation.    No follow-ups on file.    Signed Electronically by: Jeremy Rhodes MD    Copy of this evaluation report is provided to requesting physician.    [facility] Guidelines    Revised Cardiac Risk Index

## 2021-06-12 NOTE — TELEPHONE ENCOUNTER
Medication Question or Clarification  Who is calling: Patient  What medication are you calling about (include dose and sig)?:   zolpidem (AMBIEN) 5 MG tablet 30 tablet 0 8/19/2020     Sig: TAKE 1 TABLET BY MOUTH ONCE DAILY AT BEDTIME AS NEEDED FOR SLEEP    Sent to pharmacy as: zolpidem 5 mg tablet (AMBIEN)    E-Prescribing Status: Receipt confirmed by pharmacy (8/19/2020 10:33 AM CDT)        Who prescribed the medication?: Luisana Tinoco MD  What is your question/concern?: I normally get this Rx for 90 days and get a price discount on this.  This was filled by another provider and I am requesting Jeremy Rhodes MD fill this for 90 days going forward as I will be out of this medication soon.   Requested Pharmacy: [facility]  Okay to leave a detailed message?: Yes

## 2021-06-12 NOTE — PROGRESS NOTES
Maria Elena comes in today for evaluation of abdominal pain and bloating.  This is been occurring for approximately the last 2 weeks, but overall has been occurring a bit intermittently since she started simvastatin in 2013.  He states that she was having some constipation over the last couple of weeks and tried MiraLAX which did not help.  She used some Dulcolax approximately 4 days ago which did help significantly.  She has been having regular bowel movements since then.  She is still having some residual low level diffuse abdominal pain as well as some abdominal bloating, but she states that is not significantly interfering with her day-to-day activities.  She denies any fevers and denies any blood in her stool or black tarry stools.  Denies any nausea or vomiting.  Otherwise a complete review of systems is undertaken and was negative.    Objective: Vital signs are as per the EMR.  In general the patient is alert pleasant and in no acute distress.  He appears healthy.    Abdomen is soft, slightly tender diffusely with some mild abdominal distention.    Assessment and plan: Residual abdominal pain and bloating after an episode of constipation.  We discussed increasing her water intake along with using a regular fiber supplement.  Also, in review of the literature, it appears that simvastatin has about a 7% chance of causing constipation.  Given the timing of her symptoms I believe it is reasonable to switch statins, which she is very much open to.  I am going to switch her from 20 mg of simvastatin to 10 mg of Lipitor.  If she is not improved in a week or 2 she should see either myself or Dr. Zee Rhodes back.  Otherwise she has an appointment with Dr. Rhodes in October of this year.

## 2021-06-13 NOTE — PROGRESS NOTES
ASSESSMENT and PLAN:  1. Routine general medical examination at a health care facility  She does not get the flu vaccine due to allergy.  Other immunizations are up to date.  She will update her mammogram.  She will see her gynecologist.    2. Hyperlipidemia  She is having constipation with atorvastatin.  We will try Crestor.  I also discussed adding MiraLAX as a daily supplement.  She is Xavier taking fiber in the morning and a stool softener at night.  - rosuvastatin (CRESTOR) 20 MG tablet; Take 1 tablet (20 mg total) by mouth at bedtime.  Dispense: 90 tablet; Refill: 3  - Comprehensive Metabolic Panel  - Lipid Cascade    3. Hypertension  Well-controlled.  She is on triamterene with hydrochlorothiazide.  - rosuvastatin (CRESTOR) 20 MG tablet; Take 1 tablet (20 mg total) by mouth at bedtime.  Dispense: 90 tablet; Refill: 3  - Comprehensive Metabolic Panel    4. Stroke Syndrome  She has been without evidence of recurrence.  She follows regularly with neurology.  She is on medical management for prevention of secondary events.  We are switching her statin from atorvastatin to Crestor.  She is on an aspirin.  - rosuvastatin (CRESTOR) 20 MG tablet; Take 1 tablet (20 mg total) by mouth at bedtime.  Dispense: 90 tablet; Refill: 3    5. Obstructive sleep apnea  I reviewed the note from the sleep clinic, Dr. Harvey from August 11, 2016.  She has been using CPAP.  She was using the device nightly and tolerating without difficulty.    6. Bunion of left foot  She is given a referral to podiatry.  Dr. Wayne did her first surgery and she was very pleased with the results.  - Ambulatory referral to Podiatry    7. Screening mammogram, encounter for  She will schedule this  - Mammo Screening Bilateral; Future    8. Family history of ovarian cancer  Her gynecologist has had her get annual pelvic ultrasounds.  This was ordered today.  She will follow-up with her gynecologist on November 2 to discuss results.  - US Pelvis With  Transvaginal Non OB; Future    9. Menopause  She is due for bone density test and that was ordered today.  - DXA Bone Density Scan; Future      Patient Instructions   Smooth Move tea might be an option for constipation.  Daily miralax is fine too.    I sent in Crestor in place of atorvastatin.    [facility]  PHONE: [phone]  PROVIDERS: Skyler Wayne DPM; Ronnie Dumont DPM    Please set up your mammogram and pelvic ultrasound:  [phone]- [place] [facility]    Today's labs will be available on [employer].  If you aren't signed up, then we'll mail them out.  If anything needs more immediate follow up, we'll also call.        Orders Placed This Encounter   Procedures     Mammo Screening Bilateral     Standing Status:   Future     Standing Expiration Date:   10/19/2018     Scheduling Instructions:      PATIENT WILL SCHEDULE?  Yes     Order Specific Question:   Reason for Exam (Describe Symptoms):     Answer:   Screening mammogram     Order Specific Question:   Can the procedure be changed per Radiologist protocol?     Answer:   Yes     US Pelvis With Transvaginal Non OB     Standing Status:   Future     Standing Expiration Date:   10/18/2018     Order Specific Question:   Can the procedure be changed per Radiologist protocol?     Answer:   Yes     DXA Bone Density Scan     Standing Status:   Future     Standing Expiration Date:   10/19/2018     Scheduling Instructions:      PATIENT WILL SCHEDULE?  Yes            [facility]       [phone]- [place]       [phone]- [place]       [phone]- [place]      [phone]- [place]     Order Specific Question:   Can the procedure be changed per Radiologist protocol?     Answer:   Yes     Comprehensive Metabolic Panel     Lipid Cascade     Order Specific Question:   Fasting is required?     Answer:   Yes     Ambulatory referral to Podiatry     Referral Priority:   Routine     Referral Type:   Consultation     Referral  Reason:   Evaluation and Treatment     Requested Specialty:   Podiatry     Number of Visits Requested:   1     Medications Discontinued During This Encounter   Medication Reason     atorvastatin (LIPITOR) 10 MG tablet        No Follow-up on file.    ASSESSED PROBLEMS:  Problem List Items Addressed This Visit     Obstructive sleep apnea    Hyperlipidemia    Relevant Medications    rosuvastatin (CRESTOR) 20 MG tablet    Other Relevant Orders    Comprehensive Metabolic Panel    Lipid [place]    Hypertension    Relevant Medications    rosuvastatin (CRESTOR) 20 MG tablet    Other Relevant Orders    Comprehensive Metabolic Panel    Stroke Syndrome    Relevant Medications    rosuvastatin (CRESTOR) 20 MG tablet      Other Visit Diagnoses     Routine general medical examination at a health care facility    -  Primary    Bunion of left foot        Relevant Orders    Ambulatory referral to Podiatry    Screening mammogram, encounter for        Relevant Orders    Mammo Screening Bilateral    Family history of ovarian cancer        Relevant Orders    US Pelvis With Transvaginal Non OB    Menopause        Relevant Orders    DXA Bone Density Scan          CHIEF COMPLAINT:  Chief Complaint   Patient presents with     Annual Exam     fasting       HISTORY OF PRESENT ILLNESS:  Maria Elena Sprague is a 67 y.o. female is presenting to the clinic today for an annual exam.  She is fasting today.  On November 2, she is scheduled to have her Pap smear and pelvic exam as well as a breast exam.    She would like a referral for toe surgery on her left.  She has had a titanium joint replacement in the great toe on the right and was very happy with her results.  Her left foot is becoming painful now.  She would like to hold off until spring for surgery but would like to discuss that.    She is seeing dermatology for treatment for fungus on her thumbnail.  She is soaking her thumbnail in diluted bleach nightly and then using the Penlac polish.    She  just saw her neurologist.  She has been having increasing back spasms.  Her baclofen was increased to twice daily, she is to titrate that based on pain and sedation level.  She is also on gabapentin.  She does not routinely take 2 of the 10 mg tablets right now.    She would like a referral for her mammogram as well as her pelvic ultrasound.  She has been getting pelvic ultrasounds annually due to a family history of ovarian cancer.    She reviewed with me that she had carpal tunnel surgery done in April.  She was very pleased with the results.    She has insomnia.  She continues using zolpidem for sleep and has had no ill side effects of that medication.    She has had no recurrence of her stroke symptoms.  She recently followed up with her neurologist.  She is on atorvastatin and aspirin.    Health Maintenance:  She has her eyes examined regularly and visits the dentist on a regular basis.   Mammogram: August 18, 2016  Colonoscopy: November 11, 2014  Pap Smear: She will be having this done on November 2.  I gave her our card so that the results may be faxed here.    REVIEW OF SYSTEMS:   She denies nipple discharge and drainage.   GI: Constipation, noted since starting statin medication  : Frequent voiding and urge and voiding  MSK: Pain in legs with exercise  RESP: Shortness of breath with stairs   all other systems are negative.    PFSH:  Past Medical History:   Diagnosis Date     Arthritis     neck and back     Hyperlipidemia      Hypertension      Insomnia      Leg pain     since stroke     Low back pain      MTHFR mutation     per patient report, tested after stroke in 2013      Obesity      Osteopenia      Shortness of breath     with exertion     Sleep apnea     cpap     Stroke 2013    balance issue     Past Surgical History:   Procedure Laterality Date     APPENDECTOMY       big toe rt side Right      COLONOSCOPY  2009     IL REVISE MEDIAN N/CARPAL TUNNEL SURG Right 4/19/2017    Procedure: OPEN RIGHT  CARPAL TUNNEL RELEASE WITH FLEXOR SYNOVECTOMY;  Surgeon: Kaushik Rodriguez MD;  [facility]: [facility];  Service: Hand     Family History   Problem Relation Age of Onset     Ovarian cancer Mother      late 70s     Colon cancer Father      late 60s     Prostate cancer Brother 56     Pancreatic cancer Maternal Aunt 82     Stomach cancer Maternal Uncle      80s     Cancer Paternal Uncle      melanoma and bladder in 80s     Skin cancer Maternal Grandmother      Colon cancer Maternal Grandfather      early 80s     Stomach cancer Paternal Grandfather      60s     Stomach cancer Other 81     paternal-maternal great-aunt     Kidney cancer Other      paternal side,  at 57     Colon cancer Other 52     paternal side     Stomach cancer Other      paternal side,  at 54     Leukemia Other      paternal side, late 50s     Breast cancer Other      maternal first-cousin, late 40s     Lymphoma Other 74     paternal side, MALT     Lymphoma Other      paternal side,  at 50     Stomach cancer Other 84     paternal side     Cancer Other      paternal side, breast and stomach cancer     Uterine cancer Other 78     paternal side     Cancer Other 83     paternal side, prostate and bone cancer     Leukemia Other      paternal side, 80s     Lung cancer Other      paternal side, 90s     Lung cancer Maternal Aunt      80s     Skin cancer Maternal Uncle      Cancer Maternal Uncle      stomach and colon cancer in 60s     Cancer Maternal Uncle      pituitary tumor 50s     Breast cancer Paternal Aunt      Age in 70's     Breast cancer Paternal Aunt      Age in 70's     Social History     Social History     Marital status:      Spouse name: N/A     Number of children: N/A     Years of education: N/A     Occupational History     Not on file.     Social History Main Topics     Smoking status: Former Smoker     Packs/day: 1.00     Years: 2.00     Quit date: 10/27/1974     Smokeless tobacco: Never Used     Alcohol use 3.6  "oz/week     6 Glasses of wine per week     Drug use: No     Sexual activity: Not on file     Other Topics Concern     Not on file     Social History Narrative       VITALS:  Vitals:    10/18/17 1039   BP: 134/86   Patient Site: Right Arm   Patient Position: Sitting   Cuff Size: Adult Regular   Pulse: 72   Weight: 183 lb (83 kg)   Height: 5' 4\" (1.626 m)     Wt Readings from Last 3 Encounters:   10/18/17 183 lb (83 kg)   08/14/17 187 lb (84.8 kg)   04/19/17 185 lb 8 oz (84.1 kg)       PHYSICAL EXAM:  Constitutional:  Reveals an alert, pleasant adult female.   Vitals:  Noted.   Head: Normocephalic, without obvious abnormality, atraumatic   Ears: TM's normal bilaterally   Eyes: PERRL, conjunctiva/corneas clear, EOM's intact   Throat: Lips, mucosa, and tongue normal; teeth and gums normal   Neck: Normal ROM, no carotid bruits, no thyromegaly   Lungs: Clear to auscultation bilaterally, respirations unlabored.   Breast exam: Deferred by patient, this will be done by her gynecologist next month  Heart: Regular rate and rhythm, S1 and S2 normal, no murmur, rub, or gallop,   Abdomen: Soft, non-tender, bowel sounds active all four quadrants, no masses, no organomegaly   Extremities: Extremities normal, atraumatic, no cyanosis or edema     Pelvic:Not examined and Her gynecologist will do this next month  Skin: Skin color, texture, turgor normal, no rashes or lesions   Neurologic: Normal     MEDICATIONS:  Current Outpatient Prescriptions   Medication Sig Dispense Refill     albuterol (PROVENTIL HFA;VENTOLIN HFA) 90 mcg/actuation inhaler Inhale 2 puffs every 6 (six) hours as needed for wheezing. 8.5 g 11     aspirin 325 MG tablet Take 325 mg by mouth at bedtime.        b complex vitamins tablet Take 1 tablet by mouth daily.       baclofen (LIORESAL) 10 MG tablet Take 10 mg by mouth bedtime.       calcium carbonate (OS-TEREZA) 600 mg (1,500 mg) tablet Take 600 mg by mouth daily.       cholecalciferol, vitamin D3, (VITAMIN D3) 2,000 " unit cap Take by mouth.       gabapentin (NEURONTIN) 300 MG capsule Take 300 mg by mouth 4 (four) times a day.       inhalational spacing device Spcr Use with each use of the inhaler. 1 each 0     multivitamin with minerals (THERA-M) 9 mg iron-400 mcg Tab tablet Take 1 tablet by mouth daily.       omega 3-dha-epa-fish oil 1,200 (144-216) mg cap Take by mouth.       triamterene-hydrochlorothiazide (DYAZIDE) 37.5-25 mg per capsule TAKE 1 CAPSULE BY MOUTH DAILY. 90 capsule 3     tryptophan 500 mg Tab Take by mouth.       zolpidem (AMBIEN) 5 MG tablet TAKE 1 TABLET (5 MG TOTAL) BY MOUTH BEDTIME AS NEEDED FOR SLEEP. 135 tablet 0     ciclopirox (PENLAC) 8 % solution APPLY TO AFFECTED AREA BY TOPICAL ROUTE DAILY AT BEDTIME OR 8 HRS BEFORE WASHING  2     rosuvastatin (CRESTOR) 20 MG tablet Take 1 tablet (20 mg total) by mouth at bedtime. 90 tablet 3     traMADol (ULTRAM) 50 mg tablet 1-2 tabs PO Q 4-6 hours PRN pain 20 tablet 0     No current facility-administered medications for this visit.

## 2021-06-13 NOTE — PROGRESS NOTES
Assessment and Plan:     Maria Elena is here to establish care and or AWV.    Diagnoses and all orders for this visit:    Routine general medical examination at a health care facility  She is up-to-date with her colonoscopy.    Hyperlipidemia  She is taking Crestor.  We will check her lipids today.  -     rosuvastatin (CRESTOR) 20 MG tablet; Take 1 tablet (20 mg total) by mouth daily.  Dispense: 90 tablet; Refill: 0  -     Lipid [place], FASTING; Future; Expected date: 11/23/2020    Hypertension  Well-controlled on Dyazide.  We will check her electrolytes and kidney function today.  -     triamterene-hydrochlorothiazide (DYAZIDE) 37.5-25 mg per capsule; Take 1 capsule by mouth daily.  Dispense: 90 capsule; Refill: 0  -     Basic Metabolic Panel    Stroke Syndrome  History of a stroke due to clot.  She follows with a neurologist.  She is on 325 mg of aspirin as well as a statin.  She has the MTHFR mutation which predisposes her to clotting.  -     rosuvastatin (CRESTOR) 20 MG tablet; Take 1 tablet (20 mg total) by mouth daily.  Dispense: 90 tablet; Refill: 0    MTHFR mutation (H)    Wheezing  Has a history of using albuterol with respiratory infections in the past.  Formal diagnosis of any lung disease such as asthma or COPD.  She is not a smoker.  -     albuterol (PROAIR HFA;PROVENTIL HFA;VENTOLIN HFA) 90 mcg/actuation inhaler; Inhale 2 puffs every 6 (six) hours as needed for wheezing.  Dispense: 8.5 g; Refill: 11    Insomnia  She has been on Ambien for quite a long time.  She does not have any side effects from it.  Refill was sent.  -     zolpidem (AMBIEN) 5 MG tablet; TAKE 1 TABLET BY MOUTH ONCE DAILY AT BEDTIME AS NEEDED FOR SLEEP  Dispense: 90 tablet; Refill: 0    Obstructive sleep apnea  Is compliant with her CPAP machine, but she feels that it is not working very well.  Encouraged her to follow-up with her sleep medicine doctor.    Encounter for screening mammogram for malignant neoplasm of breast  -     Mammo  Screening Bilateral; Future; Expected date: 11/23/2020    Screening for osteoporosis  Normal DEXA scan back in 2017.  She does take vitamin D  -     DXA Bone Density Scan; Future; Expected date: 11/23/2020  -     DXA Bone Density Scan    Vitamin deficiency  -     Vitamin D, Total (25-Hydroxy)    Body mass index (BMI) 32.0-32.9, adult   -     Vitamin D, Total (25-Hydroxy)    Asymptomatic menopausal state   -     DXA Bone Density Scan; Future; Expected date: 11/23/2020  -     DXA Bone Density Scan    Family history of malignant neoplasm of ovary  She follows with gynecology.    The patient's current medical problems were reviewed.    The following health maintenance schedule was reviewed with the patient and provided in printed form in the after visit summary:   Health Maintenance   Topic Date Due     ZOSTER VACCINES (2 of 3) 02/14/2011     DXA SCAN  11/20/2020     MEDICARE ANNUAL WELLNESS VISIT  11/23/2021     FALL RISK ASSESSMENT  11/23/2021     MAMMOGRAM  12/30/2021     COLORECTAL CANCER SCREENING  04/30/2024     LIPID  10/31/2024     ADVANCE CARE PLANNING  11/23/2025     TD 18+ HE  10/31/2029     HEPATITIS C SCREENING  Completed     Pneumococcal Vaccine: 65+ Years  Completed     Pneumococcal Vaccine: Pediatrics (0 to 5 Years) and At-Risk Patients (6 to 64 Years)  Aged Out     HEPATITIS B VACCINES  Aged Out      Flor Correa MD  Internal Medicine and Pediatrics  [facility]       Subjective:   Chief Complaint: Maria Elena Sprague is an 70 y.o. female here for an Annual Wellness visit.     HPI:      Has urinary urgency; has to be careful not to drink too much.  She has tried Kegel exercises in the past.    Wears CPAP machine for POLLY. On ambien for many, many years. No side effects with it. Used to be on 10mg, now just 5mg at bedtime. Also taking magnesium for sleep.    Goes to chiropractor for her back.  does myofascial release on her.     During summer time, goes to the cabin.      Blood pressure 120/65's at home she is very compliant with her Dyazide.    In 2010 had a stroke due to clot. There is a family history of clots. She has MTHFR gene mutation, found out after genetic testing. Sees Dr. Salazar of Neurology. Takes aspirin 325mg daily. On baclofen by neurologist; after stroke, had difficulty with walking. Did therapy, but continued to have back pain and so he prescribed for gabapentin and baclofen.    On statin since her stroke.  Also takes a large dose of aspirin due to her predisposition to clotting.    Has gotten an inhaler in the past in setting of respiratory infections.     Her mother has ovarian cancer and so she does follow with a gynecologist.  She gets an ultrasound every so often, is due for one this year.  Does not have any pelvic concerns today.    Review of Systems:  Please see above.  The rest of the review of systems are negative for all systems.    Patient Care Team:  Jeremy Rhodes MD as PCP - General (Internal Medicine)  Jeremy Rhodes MD as Assigned PCP  Pradip Hicks DO as Assigned Surgical Provider     Patient Active Problem List   Diagnosis     Obstructive sleep apnea     Hyperlipidemia     Hypertension     History of stroke     Insomnia     Family history of malignant neoplasm of ovary     MTHFR mutation (H)     Incisional hernia, without obstruction or gangrene     Past Medical History:   Diagnosis Date     Hyperlipidemia      Hypertension      Obesity      Osteopenia      Sleep apnea     uses CPAP     Stroke (H) 2013    balance issue, pretty much resolved      Past Surgical History:   Procedure Laterality Date     APPENDECTOMY  2005     big toe rt side Right      INGUINAL HERNIA REPAIR Left 5/2/2019    Procedure: REPAIR, HERNIA, INGUINAL, OPEN;  Surgeon: Bonnie Khalil DO;  Location: [facility] OR;  Service: General     ME REVISE MEDIAN N/CARPAL TUNNEL SURG Right 4/19/2017    Procedure: OPEN RIGHT CARPAL TUNNEL RELEASE WITH FLEXOR  SYNOVECTOMY;  Surgeon: Kaushik Rodriguez MD;  Location: [facility] OR;  Service: Hand      Family History   Problem Relation Age of Onset     Ovarian cancer Mother         late 70s     Stroke Mother      Colon cancer Father         late 60s     Prostate cancer Brother 56     Pancreatic cancer Maternal Aunt 82     Stomach cancer Maternal Uncle         80s     Cancer Paternal Uncle         melanoma and bladder in 80s     Skin cancer Maternal Grandmother      Colon cancer Maternal Grandfather         early 80s     Stomach cancer Paternal Grandfather         60s     Stomach cancer Other 81        paternal-maternal great-aunt     Kidney cancer Other         paternal side,  at 57     Colon cancer Other 52        paternal side     Stomach cancer Other         paternal side,  at 54     Leukemia Other         paternal side, late 50s     Breast cancer Other         maternal first-cousin, late 40s     Lymphoma Other 74        paternal side, MALT     Lymphoma Other         paternal side,  at 50     Stomach cancer Other 84        paternal side     Cancer Other         paternal side, breast and stomach cancer     Uterine cancer Other 78        paternal side     Cancer Other 83        paternal side, prostate and bone cancer     Leukemia Other         paternal side, 80s     Lung cancer Other         paternal side, 90s     Lung cancer Maternal Aunt         80s     Skin cancer Maternal Uncle      Cancer Maternal Uncle         stomach and colon cancer in 60s     Cancer Maternal Uncle         pituitary tumor 50s     Breast cancer Paternal Aunt         Age in 70's     Breast cancer Paternal Aunt         Age in 70's     Stroke Sister      Breast cancer Sister 65      Social History     Socioeconomic History     Marital status:      Spouse name: Not on file     Number of children: Not on file     Years of education: Not on file     Highest education level: Not on file   Occupational History     Not on file    Social Needs     Financial resource strain: Not on file     Food insecurity     Worry: Not on file     Inability: Not on file     Transportation needs     Medical: Not on file     Non-medical: Not on file   Tobacco Use     Smoking status: Former Smoker     Packs/day: 1.00     Years: 2.00     Pack years: 2.00     Quit date: 10/27/1974     Years since quittin.1     Smokeless tobacco: Never Used   Substance and Sexual Activity     Alcohol use: Yes     Alcohol/week: 6.0 standard drinks     Types: 6 Glasses of wine per week     Drug use: No     Sexual activity: Not on file   Lifestyle     Physical activity     Days per week: Not on file     Minutes per session: Not on file     Stress: Not on file   Relationships     Social connections     Talks on phone: Not on file     Gets together: Not on file     Attends Holiness service: Not on file     Active member of club or organization: Not on file     Attends meetings of clubs or organizations: Not on file     Relationship status: Not on file     Intimate partner violence     Fear of current or ex partner: Not on file     Emotionally abused: Not on file     Physically abused: Not on file     Forced sexual activity: Not on file   Other Topics Concern     Not on file   Social History Narrative     40+ years. Several grown kids and grandkids        Had 2 children.      Current Outpatient Medications   Medication Sig Dispense Refill     aspirin 325 MG tablet Take 325 mg by mouth at bedtime.        b complex vitamins tablet Take 1 tablet by mouth daily.       baclofen (LIORESAL) 10 MG tablet Take 10 mg by mouth every evening.              cholecalciferol, vitamin D3, (VITAMIN D3) 2,000 unit cap Take 2,000 Units by mouth every evening.        COQ10, UBIQUINOL, ORAL Take 1 capsule by mouth daily.       fluticasone propionate (FLONASE) 50 mcg/actuation nasal spray Apply 1 spray into each nostril daily as needed.        gabapentin (NEURONTIN) 300 MG capsule Take 300 mg  by mouth 4 (four) times a day.       magnesium oxide 200 mg magnesium tablet Take 400 mg by mouth 2 (two) times a day. Pt taking 800mg every morning, and 1200mg every evening.       melatonin 3 mg Tab tablet Take 3 mg by mouth at bedtime as needed.       multivitamin with minerals (THERA-M) 9 mg iron-400 mcg Tab tablet Take 1 tablet by mouth daily.       omega 3-dha-epa-fish oil 1,200 (144-216) mg cap Take 1,200 mg by mouth daily.        triamterene-hydrochlorothiazide (DYAZIDE) 37.5-25 mg per capsule Take 1 capsule by mouth daily. 90 capsule 0     zolpidem (AMBIEN) 5 MG tablet TAKE 1 TABLET BY MOUTH ONCE DAILY AT BEDTIME AS NEEDED FOR SLEEP 90 tablet 0     albuterol (PROAIR HFA;PROVENTIL HFA;VENTOLIN HFA) 90 mcg/actuation inhaler Inhale 2 puffs every 6 (six) hours as needed for wheezing. 8.5 g 11     rosuvastatin (CRESTOR) 20 MG tablet Take 1 tablet (20 mg total) by mouth daily. 90 tablet 0     No current facility-administered medications for this visit.       Objective:   Vital Signs:   Visit Vitals  /80 (Patient Site: Right Arm, Patient Position: Sitting)   Pulse 70   Wt 191 lb 7 oz (86.8 kg)   SpO2 97%   BMI 32.86 kg/m           VisionScreening:  No exam data present     PHYSICAL EXAM  /80 (Patient Site: Right Arm, Patient Position: Sitting)   Pulse 70   Wt 191 lb 7 oz (86.8 kg)   SpO2 97%   BMI 32.86 kg/m      General Appearance:    Alert, cooperative, no distress, appears stated age   Head:    Normocephalic, without obvious abnormality, atraumatic   Eyes:    PERRL, conjunctiva/corneas clear, EOM's intact   Ears:    Normal TM's and external ear canals, both ears   Nose:   Nares normal, septum midline, mucosa normal, no drainage     or sinus tenderness   Throat:   Lips, mucosa, and tongue normal; teeth and gums normal   Neck:   Supple, symmetrical, trachea midline, no adenopathy   Lungs:     Clear to auscultation bilaterally, respirations unlabored   Chest Wall:    No tenderness or deformity     Heart:    Regular rate and rhythm, S1 and S2 normal, no murmur, rub    or gallop   Breast Exam:    No tenderness, masses, or nipple abnormality   Abdomen:     Soft, non-tender, bowel sounds active all four quadrants,     no masses, no organomegaly   Genitalia:    Not examined   Extremities:   Extremities normal, atraumatic, no cyanosis or edema   Lymph nodes:   Cervical, supraclavicular, and axillary nodes normal   Neurologic:   CNII-XII intact, normal strength, sensation grossly normal         Assessment Results 11/23/2020   Activities of Daily Living No help needed   Instrumental Activities of Daily Living No help needed   Mini Cog Total Score 5   Some recent data might be hidden     A Mini-Cog score of 0-2 suggests the possibility of dementia, score of 3-5 suggests no dementia      Identified Health Risks:     Information on urinary incontinence and treatment options given to patient.  Patient's advanced directive was discussed and I am comfortable with the patient's wishes.

## 2021-06-13 NOTE — PATIENT INSTRUCTIONS - HE
Talk to your insurance company about the new shingles vaccine called SHINGRIX.  Go to lab today. We will call with results.  Orders for mammogram and bone density scan have been placed.    Patient Education   Urinary Incontinence, Female (Adult)  Urinary incontinence means loss of control of the bladder. This problem affects many women, especially as they get older. If you have incontinence, you may be embarrassed to ask for help. But know that this problem can be treated.  Types of Incontinence  There are different types of incontinence. Two of the main types are described here. You can have more than one type.    Stress incontinence. With this type, urine leaks when pressure (stress) is put on the bladder. This may happen when you cough, sneeze, or laugh. Stress incontinence most often occurs because the pelvic floor muscles that support the bladder and urethra are weak. This can happen after pregnancy and vaginal childbirth or a hysterectomy. It can also be due to excess body weight or hormone changes.    Urge incontinence (also called overactive bladder). With this type, a sudden urge to urinate is felt often. This may happen even though there may not be much urine in the bladder. The need to urinate often during the night is common. Urge incontinence most often occurs because of bladder spasms. This may be due to bladder irritation or infection. Damage to bladder nerves or pelvic muscles, constipation, and certain medicines can also lead to urge incontinence.  Treatment of urinary incontinence depends on the cause. Further evaluation is needed to find the type you have. This will likely include an exam and certain tests. Based on the results, you and your healthcare provider can then plan treatment. Until a diagnosis is made, the home care tips below can help relieve symptoms.  Home care    Do pelvic floor muscle exercises, if they are prescribed. The pelvic floor muscles help support the bladder and urethra.  Many women find that their symptoms improve when doing special exercises that strengthen these muscles. To do the exercises contract the muscles you would use to stop your stream of urine, but do this when you re not urinating. Hold for 10 seconds, then relax. Repeat 10 to 20 times in a row, at least 3 times a day. Your provider may give you other instructions for how to do the exercises and how often.    Keep a bladder diary. This helps track how often and how much you urinate over a set period of time. Bring this diary with you to your next visit with the provider. The information can help your provider learn more about your bladder problem.    Lose weight, if advised to by your provider. Excess weight puts pressure on the bladder. Your provider can help you create a weight-loss plan that s right for you. This may include exercising more and making certain diet changes.    Don't consume foods and drinks that may irritate the bladder. These can include alcohol and caffeinated drinks.    Quit smoking. Smoking and other tobacco use can lead to chronic cough that strains the pelvic floor muscles. Smoking may also damage the bladder and urethra. Talk with your provider about treatments or methods you can use to quit smoking.    If drinking large amounts of fluid causes you to have symptoms, you may be advised to limit your fluid intake. You may also be advised to drink most of your fluids during the day and to limit fluids at night.    If you re worried about urine leakage or accidents, you may wear absorbent pads to catch urine. Change the pads often. This helps reduce discomfort. It may also reduce the risk of skin or bladder infections.  Follow-up care  Follow up with your healthcare provider, or as directed. It may take some to find the right treatment for your problem. Your treatment plan may include special therapies or medicines. Certain procedures or surgery may also be options. Be sure to discuss any questions  you have with your provider.  When to seek medical advice  Call the healthcare provider right away if any of these occur:    Fever of 100.4 F (38 C) or higher, or as directed by your provider    Bladder pain or fullness    Abdominal swelling    Nausea or vomiting    Back pain    Weakness, dizziness or fainting  Date Last Reviewed: 10/1/2017    4332-5367 [employer]. [address] [place] [address]. All rights reserved. This information is not intended as a substitute for professional medical care. Always follow your healthcare professional's instructions.       Advance Directive  Patient s advance directive was discussed and I am comfortable with the patient s wishes.  Patient Education   Personalized Prevention Plan  You are due for the preventive services outlined below.  Your care team is available to assist you in scheduling these services.  If you have already completed any of these items, please share that information with your care team to update in your medical record.  Health Maintenance   Topic Date Due     ZOSTER VACCINES (2 of 3) 02/14/2011     DXA SCAN  11/20/2020     MEDICARE ANNUAL WELLNESS VISIT  11/23/2021     FALL RISK ASSESSMENT  11/23/2021     MAMMOGRAM  12/30/2021     COLORECTAL CANCER SCREENING  04/30/2024     LIPID  10/31/2024     ADVANCE CARE PLANNING  10/31/2024     TD 18+ HE  10/31/2029     HEPATITIS C SCREENING  Completed     Pneumococcal Vaccine: 65+ Years  Completed     Pneumococcal Vaccine: Pediatrics (0 to 5 Years) and At-Risk Patients (6 to 64 Years)  Aged Out     HEPATITIS B VACCINES  Aged Out

## 2021-06-13 NOTE — PROGRESS NOTES
Sent 11/25/20:    Mayank Arredondo,    It was so nice to meet you the other day in clinic.  Your labs are back.  Your vitamin D, electrolytes, kidney function, and cholesterol levels look good.    Please let me know if you any questions or concerns,  Dr. Pritchard

## 2021-06-13 NOTE — TELEPHONE ENCOUNTER
Refill Approved    Rx renewed per Medication Renewal Policy. Medication was last renewed on 10/31/9.    Joi Razo, [facility] Connection Triage/Med Refill 11/19/2020     Requested Prescriptions   Pending Prescriptions Disp Refills     rosuvastatin (CRESTOR) 20 MG tablet [Pharmacy Med Name: Rosuvastatin Calcium 20 MG Oral Tablet] 90 tablet 0     Sig: Take 1 tablet by mouth once daily       Statins Refill Protocol (Hmg CoA Reductase Inhibitors) Passed - 11/18/2020  8:16 AM        Passed - PCP or prescribing provider visit in past 12 months      Last office visit with prescriber/PCP: 8/14/2017 Jeremy Rhodes MD OR same dept: 12/23/2019 Maverick Arreola CNP OR same specialty: 12/23/2019 Maverick Arreola CNP  Last physical: 9/17/2020 Last MTM visit: Visit date not found   Next visit within 3 mo: Visit date not found  Next physical within 3 mo: Visit date not found  Prescriber OR PCP: Jeremy Rhodes MD  Last diagnosis associated with med order: 1. Hyperlipidemia  - rosuvastatin (CRESTOR) 20 MG tablet [Pharmacy Med Name: Rosuvastatin Calcium 20 MG Oral Tablet]; Take 1 tablet by mouth once daily  Dispense: 90 tablet; Refill: 0    2. Hypertension  - rosuvastatin (CRESTOR) 20 MG tablet [Pharmacy Med Name: Rosuvastatin Calcium 20 MG Oral Tablet]; Take 1 tablet by mouth once daily  Dispense: 90 tablet; Refill: 0    3. Stroke Syndrome  - rosuvastatin (CRESTOR) 20 MG tablet [Pharmacy Med Name: Rosuvastatin Calcium 20 MG Oral Tablet]; Take 1 tablet by mouth once daily  Dispense: 90 tablet; Refill: 0    If protocol passes may refill for 12 months if within 3 months of last provider visit (or a total of 15 months).

## 2021-06-14 NOTE — PROGRESS NOTES
Subjective findings: The patient return to the clinic today complaining of a painful big toe joint involving her left foot.  She has had the pain for several months.  She stated that the pain is progressing and she is finding it more difficult to walk and to wear shoes without pain.  She had a similar problem with her right foot.  Several years ago she had an implant arthroplasty performed and she stated that her right foot has worked up quite well.  She is interested in having the similar procedure performed on her left foot.    Objective findings: Nails bilateral feet are normal length and color.  Skin bilateral feet warm and intact.   DP and PT pulses +2/4 bilateral feet.  Capillary refill less than 2 seconds bilateral.  Negative clonus, negative Babinski bilateral feet.  Range of motion within normal limits bilaterally.  There is decreased range of motion  noted at the first metatarsophalangeal joint left foot.  There is crepitus on range of motion of the first MPJ left foot.  There is significant pain  on range of motion of the first metatarsophalangeal joint left foot.    Assessment: Hallux limitus left foot    Plan: An x-ray of the left foot was taken today.  I informed the patient that she has significant degenerative changes at the first metatarsophalangeal joint which will require a first metatarsal phalangeal joint implant arthroplasty to successfully treat her painful joint.  The procedure was described to the patient once again.  The patient felt comfortable having this procedure performed on her left foot.  Particularly since she has undergone a similar procedure on the right foot.  She stated when she is prepared to move forward with the surgical procedure she will contact the clinic at the appropriate time.

## 2021-06-16 NOTE — TELEPHONE ENCOUNTER
Last Office Visit  11/23/2020 Dr Anabelle Correa      Notes:  Routine general medical examination at a health care facility  She is up-to-date with her colonoscopy.     Hyperlipidemia  She is taking Crestor.  We will check her lipids today.  -     rosuvastatin (CRESTOR) 20 MG tablet; Take 1 tablet (20 mg total) by mouth daily.  Dispense: 90 tablet; Refill: 0  -     Lipid [place], FASTING; Future; Expected date: 11/23/2020     Hypertension  Well-controlled on Dyazide.  We will check her electrolytes and kidney function today.  -     triamterene-hydrochlorothiazide (DYAZIDE) 37.5-25 mg per capsule; Take 1 capsule by mouth daily.  Dispense: 90 capsule; Refill: 0  -     Basic Metabolic Panel     Stroke Syndrome  History of a stroke due to clot.  She follows with a neurologist.  She is on 325 mg of aspirin as well as a statin.  She has the MTHFR mutation which predisposes her to clotting.  -     rosuvastatin (CRESTOR) 20 MG tablet; Take 1 tablet (20 mg total) by mouth daily.  Dispense: 90 tablet; Refill: 0     MTHFR mutation (H)     Wheezing  Has a history of using albuterol with respiratory infections in the past.  Formal diagnosis of any lung disease such as asthma or COPD.  She is not a smoker.  -     albuterol (PROAIR HFA;PROVENTIL HFA;VENTOLIN HFA) 90 mcg/actuation inhaler; Inhale 2 puffs every 6 (six) hours as needed for wheezing.  Dispense: 8.5 g; Refill: 11     Insomnia  She has been on Ambien for quite a long time.  She does not have any side effects from it.  Refill was sent.  -     zolpidem (AMBIEN) 5 MG tablet; TAKE 1 TABLET BY MOUTH ONCE DAILY AT BEDTIME AS NEEDED FOR SLEEP  Dispense: 90 tablet; Refill: 0     Obstructive sleep apnea  Is compliant with her CPAP machine, but she feels that it is not working very well.  Encouraged her to follow-up with her sleep medicine doctor.     Encounter for screening mammogram for malignant neoplasm of breast  -     Mammo Screening Bilateral; Future; Expected date:  11/23/2020     Screening for osteoporosis  Normal DEXA scan back in 2017.  She does take vitamin D  -     DXA Bone Density Scan; Future; Expected date: 11/23/2020  -     DXA Bone Density Scan     Vitamin deficiency  -     Vitamin D, Total (25-Hydroxy)     Body mass index (BMI) 32.0-32.9, adult   -     Vitamin D, Total (25-Hydroxy)     Asymptomatic menopausal state   -     DXA Bone Density Scan; Future; Expected date: 11/23/2020  -     DXA Bone Density Scan     Family history of malignant neoplasm of ovary  She follows with gynecology.    Last Filled:  zolpidem (AMBIEN) 5 MG tablet 90 tablet 0 11/23/2020  No   Sig: TAKE 1 TABLET BY MOUTH ONCE DAILY AT BEDTIME AS NEEDED FOR SLEEP   Sent to pharmacy as: zolpidem 5 mg tablet (AMBIEN)   E-Prescribing Status: Receipt confirmed by pharmacy (11/23/2020 12:34 PM CST)       No results found for: AMPHET, HEBENZODIAZ, OPIATES, PCP, THC, BARBIT, COCAINEMETAB, METHADNE, OXYCODONE, CREAUR      Next OV:  Visit date not found      Encounter-Level CSA Scan Date - 10/23/2018:    Scan on 10/26/2018 11:21 AM           Encounter-Level CSA Scan Date - 10/18/2017:    Scan on 10/20/2017  2:39 PM           Encounter-Level CSA Scan Date - 08/17/2016:    Scan on 8/17/2016: HE       Medication teed up for provider signature - please adjust as appropriate.

## 2021-06-16 NOTE — TELEPHONE ENCOUNTER
Telephone Encounter by Karon Dash at 5/21/2019  3:27 PM     Author: Karon Dash Service: -- Author Type: --    Filed: 5/21/2019  3:28 PM Encounter Date: 5/20/2019 Status: Signed    : Karon Dash APPROVED:    Approval start date:02/20/2019  Approval end date:05/21/2020    Pharmacy has been notified of approval and will contact patient when medication is ready for pickup.

## 2021-06-16 NOTE — TELEPHONE ENCOUNTER
Controlled Substance Refill Request  Medication Name:   Requested Prescriptions     Pending Prescriptions Disp Refills     zolpidem (AMBIEN) 5 MG tablet [Pharmacy Med Name: Zolpidem Tartrate 5 MG Oral Tablet] 90 tablet 0     Sig: TAKE 1 TABLET BY MOUTH ONCE DAILY AT BEDTIME AS NEEDED FOR SLEEP     Date Last Fill: 11/23/20  Requested Pharmacy: [facility]  Submit electronically to pharmacy  Controlled Substance Agreement on file:   Encounter-Level CSA Scan Date - 10/23/2018:    Scan on 10/26/2018 11:21 AM           Encounter-Level CSA Scan Date - 10/18/2017:    Scan on 10/20/2017  2:39 PM           Encounter-Level CSA Scan Date - 08/17/2016:    Scan on 8/17/2016: HE        Last office visit:  11/23/20

## 2021-06-17 NOTE — ANESTHESIA POSTPROCEDURE EVALUATION
Patient: Maria Elena Sprague   FIRST METATARSAL PHALANGEAL JOINT IMPLANT ARTHROPLASTY LEFT FOOT  Anesthesia type: MAC    Patient location: Phase II Recovery  Last vitals:   Vitals:    04/20/18 0950   BP: 160/76   Pulse: 60   Resp: 16   Temp: 36.3  C (97.4  F)   SpO2: 98%     Post vital signs: stable  Level of consciousness: awake and responds to simple questions  Post-anesthesia pain: pain controlled  Post-anesthesia nausea and vomiting: no  Pulmonary: unassisted, return to baseline  Cardiovascular: stable and blood pressure at baseline  Hydration: adequate  Anesthetic events: no    QCDR Measures:  ASA# 11 - Jeannette-op Cardiac Arrest: ASA11B - Patient did NOT experience unanticipated cardiac arrest  ASA# 12 - Jeannette-op Mortality Rate: ASA12B - Patient did NOT die  ASA# 13 - PACU Re-Intubation Rate: NA - No ETT / LMA used for case  ASA# 10 - Composite Anes Safety: ASA10A - No serious adverse event    Additional Notes:

## 2021-06-17 NOTE — ANESTHESIA CARE TRANSFER NOTE
Last vitals:   Vitals:    04/20/18 0615   BP: 153/81   Pulse: 63   Resp: 18   Temp: 36.7  C (98.1  F)   SpO2: 98%     Patient's level of consciousness is awake  Spontaneous respirations: yes  Maintains airway independently: yes  Dentition unchanged: yes  Oropharynx: oropharynx clear of all foreign objects    QCDR Measures:  ASA# 20 - Surgical Safety Checklist: WHO surgical safety checklist completed prior to induction  PQRS# 430 - Adult PONV Prevention: 4558F - Pt received => 2 anti-emetic agents (different classes) preop & intraop  ASA# 8 - Peds PONV Prevention: NA - Not pediatric patient, not GA or 2 or more risk factors NOT present  PQRS# 424 - Jeannette-op Temp Management: 4559F - At least one body temp DOCUMENTED => 35.5C or 95.9F within required timeframe  PQRS# 426 - PACU Transfer Protocol: - Transfer of care checklist used  ASA# 14 - Acute Post-op Pain: ASA14B - Patient did NOT experience pain >= 7 out of 10

## 2021-06-17 NOTE — PROGRESS NOTES
Dear Dr. Zee Rhodes MD  [address] [place] [address],    Thank you for the opportunity to participate in the care of Maria Elena Sprageu.     She is a 67 y.o. y/o female patient who comes to the sleep medicine clinic for her yearly CPAP follow up.    She was diagnosed with POLLY  and has been using nPAP therapy.  Current PAP settings: P= 10 cwp    Her symptoms are improved since she started using CPAP. She is not snoring when she is using her device. She feels more refreshed when she wakes up.    She denies any PAP intolerance. She is using the device every night and tolerates the pressure well..     30-Days Efficacy and Compliance Data  Residual AHI: 0.8  Leak: 13.4  Days used > 4 hours: 100%  Average usage per night: 7 hours 7 minutes.    Mask Tolerance: poor tolerance due to high leaks  Skin irritation: no    Regarding her insomnia. She is using zolpidem every night. She denies any sleepiness in the morning. No balance difficulties.     Past Medical History:   Diagnosis Date     Arthritis     neck and back     Hyperlipidemia      Hypertension      Insomnia      Leg pain     since stroke     Low back pain      MTHFR mutation     per patient report, tested after stroke in 2013      Obesity      Osteopenia      Shortness of breath     with exertion     Sleep apnea     cpap     Stroke 2013    balance issue       Past Surgical History:   Procedure Laterality Date     APPENDECTOMY       big toe rt side Right      COLONOSCOPY  2009     GA REVISE MEDIAN N/CARPAL TUNNEL SURG Right 4/19/2017    Procedure: OPEN RIGHT CARPAL TUNNEL RELEASE WITH FLEXOR SYNOVECTOMY;  Surgeon: Kaushik Rodriguez MD;  Location: [facility];  Service: Hand       Social History     Social History     Marital status:      Spouse name: N/A     Number of children: N/A     Years of education: N/A     Occupational History     Not on file.     Social History Main Topics     Smoking status: Former Smoker     Packs/day: 1.00      Years: 2.00     Quit date: 10/27/1974     Smokeless tobacco: Never Used     Alcohol use 3.6 oz/week     6 Glasses of wine per week     Drug use: No     Sexual activity: Not on file     Other Topics Concern     Not on file     Social History Narrative       Review of Systems:  General: No weight gain, no weight loss  Eyes: No vision changes  ENT: No hearing changes  Cardio: No chest pain, no nocturnal dyspnea  Respiratory: No shortness of breath, no cough  Gastrointestinal: No diarrhea, no constipation  Genitourinary: No excessive urination  Tegumentary: No rashes  Neurological: No seizures, no loss of consciousness  Endo: No heat or cold intolerance.    Current Outpatient Prescriptions   Medication Sig Dispense Refill     albuterol (PROVENTIL HFA;VENTOLIN HFA) 90 mcg/actuation inhaler Inhale 2 puffs every 6 (six) hours as needed for wheezing. 8.5 g 11     aspirin 325 MG tablet Take 325 mg by mouth at bedtime.        b complex vitamins tablet Take 1 tablet by mouth daily.       baclofen (LIORESAL) 10 MG tablet Take 10 mg by mouth 2 (two) times a day.        calcium carbonate (OS-TEREZA) 600 mg (1,500 mg) tablet Take 600 mg by mouth daily.       cholecalciferol, vitamin D3, (VITAMIN D3) 2,000 unit cap Take by mouth.       ciclopirox (PENLAC) 8 % solution APPLY TO AFFECTED AREA BY TOPICAL ROUTE DAILY AT BEDTIME OR 8 HRS BEFORE WASHING  2     gabapentin (NEURONTIN) 300 MG capsule Take 300 mg by mouth 4 (four) times a day.       multivitamin with minerals (THERA-M) 9 mg iron-400 mcg Tab tablet Take 1 tablet by mouth daily.       omega 3-dha-epa-fish oil 1,200 (144-216) mg cap Take by mouth.       rosuvastatin (CRESTOR) 20 MG tablet Take 1 tablet (20 mg total) by mouth at bedtime. 90 tablet 3     triamterene-hydrochlorothiazide (DYAZIDE) 37.5-25 mg per capsule TAKE 1 CAPSULE BY MOUTH DAILY. 90 capsule 3     triamterene-hydrochlorothiazide (DYAZIDE) 37.5-25 mg per capsule TAKE 1 CAPSULE BY MOUTH DAILY. 90 capsule 2      "tryptophan 500 mg Tab Take by mouth.       zolpidem (AMBIEN) 5 MG tablet TAKE 1 TABLET (5 MG TOTAL) BY MOUTH BEDTIME AS NEEDED FOR SLEEP. 135 tablet 0     zolpidem (AMBIEN) 5 MG tablet TAKE 1 TABLET (5 MG TOTAL) BY MOUTH BEDTIME AS NEEDED FOR SLEEP. 135 tablet 0     No current facility-administered medications for this visit.        Allergies   Allergen Reactions     Influenza Virus Vaccine Whole Dizziness     Oxaprozin Swelling     Hands and feet, itching (daypro)       Physical Exam:  /64  Pulse 65  Ht 5' 4.75\" (1.645 m)  Wt 175 lb (79.4 kg)  SpO2 98%  BMI 29.35 kg/m2  BMI:Body mass index is 29.35 kg/(m^2).   GEN: NAD  Neurological: Alert, oriented to time, place, and person.  Psych: normal mood, normal affect     Labs/Studies:     I reviewed the efficacy and compliance report from his device. Data summarized on the HPI and the CPAP compliance flow sheet.     Lab Results   Component Value Date    WBC 8.4 06/06/2016    HGB 13.1 06/06/2016    HCT 38.3 06/06/2016    MCV 92 06/06/2016     06/06/2016         Chemistry        Component Value Date/Time     04/03/2018 1059    K 4.2 04/03/2018 1059     04/03/2018 1059    CO2 26 04/03/2018 1059    BUN 15 04/03/2018 1059    CREATININE 0.64 04/03/2018 1059    GLU 87 04/03/2018 1059        Component Value Date/Time    CALCIUM 9.5 04/03/2018 1059    ALKPHOS 68 10/18/2017 1140    AST 23 10/18/2017 1140    ALT 32 10/18/2017 1140    BILITOT 0.8 10/18/2017 1140              Assessment and Plan:  In summary Maria Elena Sprague is a 67 y.o. year old female who is here for follow up.    1. Obstructive Sleep Apnea.  Residual AHI is excellent  Compliance is excellent  Patient is benefiting from using PAP therapy.  Continue with P= 10 cwp.    We counseled the patient on the importannce of using her PAP device every night and the risks of not treating sleep apnea.      2- insomnia.  We reviewed the risks of using zolpidem.  She is not having any balance problems " or memory problems.  Continue zolpidem 5 mg every night    Patient verbalized understanding of these issues, agrees with the plan and all questions were answered today. Patient was given an opportuntity to voice any other symptoms or concerns not listed above. Patient did not have any other symptoms or concerns.      Patient told to return in 12 months.     MD TERA Cunningham Board Certified in Internal Medicine and Sleep Medicine  [facility].

## 2021-06-17 NOTE — PROGRESS NOTES
Subjective findings: The patient presented to the clinic today for postop visit #1, 1 week status post first metatarsal phalangeal joint implant arthroplasty left foot. The patient had no complaints.     Objective findings: The dressings were removed and the wound margins are well coaptated and maintained.  There is minimal edema noted.  There is no erythema or cellulitis noted.  Neurovascular status is intact.  Vital signs are stable.  Surgical correction is maintained.      Assessment: Hallux limitus left foot      Plan: Applied a sterile dressing to the left foot.  The patient was instructed to return to the clinic in 1 week for postop visit #2 at which time the sutures will be removed and a postop x-ray will be taken

## 2021-06-17 NOTE — TELEPHONE ENCOUNTER
Refill Approved    Rx renewed per Medication Renewal Policy. Medication was last renewed on 11/23/20.    Clement Molina, [facility] Triage/Med Refill 5/5/2021     Requested Prescriptions   Pending Prescriptions Disp Refills     triamterene-hydrochlorothiazide (DYAZIDE) 37.5-25 mg per capsule [Pharmacy Med Name: Triamterene-HCTZ 37.5-25 MG Oral Capsule] 90 capsule 0     Sig: Take 1 capsule by mouth once daily       Diuretics/Combination Diuretics Refill Protocol  Passed - 5/4/2021  5:12 PM        Passed - Visit with PCP or prescribing provider visit in past 12 months     Last office visit with prescriber/PCP: Visit date not found OR same dept: Visit date not found OR same specialty: 12/23/2019 Maverick Arreola CNP  Last physical: 11/23/2020 Last MTM visit: Visit date not found   Next visit within 3 mo: Visit date not found  Next physical within 3 mo: Visit date not found  Prescriber OR PCP: Flor Correa MD  Last diagnosis associated with med order: 1. Essential hypertension  - triamterene-hydrochlorothiazide (DYAZIDE) 37.5-25 mg per capsule [Pharmacy Med Name: Triamterene-HCTZ 37.5-25 MG Oral Capsule]; Take 1 capsule by mouth once daily  Dispense: 90 capsule; Refill: 0    If protocol passes may refill for 12 months if within 3 months of last provider visit (or a total of 15 months).             Passed - Serum Potassium in past 12 months      Lab Results   Component Value Date    Potassium 4.2 11/23/2020             Passed - Serum Sodium in past 12 months      Lab Results   Component Value Date    Sodium 138 11/23/2020             Passed - Blood pressure on file in past 12 months     BP Readings from Last 1 Encounters:   11/23/20 138/80             Passed - Serum Creatinine in past 12 months      Creatinine   Date Value Ref Range Status   11/23/2020 0.68 0.60 - 1.10 mg/dL Final

## 2021-06-17 NOTE — PROGRESS NOTES
Subjective findings: The patient presented to the clinic today for postop visit #2, 2 weeks status post first metatarsal phalangeal joint implant arthroplasty left foot. The patient had no complaints.      Objective findings: The dressings were removed and the wound margins are well healed.  There is minimal edema noted.  There is no erythema or cellulitis noted.  Neurovascular status is intact.  Vital signs are stable.  Surgical correction is maintained.      Assessment: Hallux limitus left foot      Plan: Removed all sutures today.  A postop x-ray was taken today.  I informed the patient she may now begin to wear normal shoe gear and return to the clinic as needed.

## 2021-06-17 NOTE — PATIENT INSTRUCTIONS - HE
Patient Instructions by Mitchell Ayoub DO at 12/13/2019 10:40 AM     Author: Mitchell Ayoub DO Service: -- Author Type: Physician    Filed: 12/13/2019 11:21 AM Encounter Date: 12/13/2019 Status: Addendum    : Mitchell Ayoub DO (Physician)    Related Notes: Original Note by Mitchell Ayoub DO (Physician) filed at 12/13/2019 11:20 AM       See hand out for advice. Continue the daily nasal steroid spray, and use albuterol as needed. Try using a home humidifier.    Patient Education     Viral or Bacterial Bronchitis with Wheezing (Adult)    Bronchitis is an infection of the air passages. It often occurs during a cold and is usually caused by a virus. Symptoms include cough with mucus (phlegm) and low-grade fever. This illness is contagious during the first few days and is spread through the air by coughing and sneezing, or by direct contact (touching the sick person and then touching your own eyes, nose, or mouth).  If there is a lot of inflammation, air flow is restricted. The air passages may also go into spasm, especially if you have asthma. This causes wheezing and difficulty breathing even in people who do not have asthma.  Bronchitis usually lasts 7 to 14 days. The wheezing should improve with treatment during the first week. An inhaler is often prescribed to relax the air passages and stop wheezing. Antibiotics will be prescribed if your doctor thinks there is also a secondary bacterial infection.  Home care    If symptoms are severe, rest at home for the first 2 to 3 days. When you go back to your usual activities, don't let yourself get too tired.    Do not smoke. Also avoid being exposed to secondhand smoke.    You may use over-the-counter medicine to control fever or pain, unless another medicine was prescribed. Note: If you have chronic liver or kidney disease or have ever had a stomach ulcer or gastrointestinal bleeding, talk with your healthcare provider before using these medicines. Also talk to  your provider if you are taking medicine to prevent blood clots.) Aspirin should never be given to anyone younger than 18 years of age who is ill with a viral infection or fever. It may cause severe liver or brain damage.    Your appetite may be poor, so a light diet is fine. Avoid dehydration by drinking 6 to 8 glasses of fluids per day (such as water, soft drinks, sports drinks, juices, tea, or soup). Extra fluids will help loosen secretions in the nose and lungs.    Over-the-counter cough, cold, and sore-throat medicines will not shorten the length of the illness, but they may be helpful to reduce symptoms. (Note: Do not use decongestants if you have high blood pressure.)    If you were given an inhaler, use it exactly as directed. If you need to use it more often than prescribed, your condition may be worsening. If this happens, contact your healthcare provider.    If prescribed, finish all antibiotic medicine, even if you are feeling better after only a few days.  Follow-up care  Follow up with your healthcare provider, or as advised. If you had an X-ray or ECG (electrocardiogram), a specialist will review it. You will be notified of any new findings that may affect your care.  If you are age 65 or older, or if you have a chronic lung disease or condition that affects your immune system, or you smoke, ask your healthcare provider about getting a pneumococcal vaccine and a yearly flu shot (influenza vaccine).  When to seek medical advice  Call your healthcare provider right away if any of these occur:    Fever of 100.4 F (38 C) or higher, or as directed by your healthcare provider    Coughing up increasing amounts of colored sputum    Weakness, drowsiness, headache, facial pain, ear pain, or a stiff neck  Call 911  Call 911 if any of these occur.    Coughing up blood    Worsening weakness, drowsiness, headache, or stiff neck    Increased wheezing not helped with medication, shortness of breath, or pain with  breathing  Date Last Reviewed: 9/13/2015 2000-2017 [employer]. [address] [place] [address], [place] [address]. All rights reserved. This information is not intended as a substitute for professional medical care. Always follow your healthcare professional's instructions.

## 2021-06-17 NOTE — PROGRESS NOTES
Preoperative Exam    Scheduled Procedure: Joint replacement in Left big toe  Surgery Date:  4/20/18  Surgery Location: [facility], fax [phone]    Surgeon:  Dr. Wayne    Assessment/Plan:     1. Preop cardiovascular exam  Her EKG is normal.  Pending labs, I see no contraindication to planned procedure.  - Electrocardiogram Perform and Read  - Electrocardiogram Perform and Read    2. Essential hypertension  Stable on her Dyazide.  She was advised not to take this the morning of surgery.  - Basic Metabolic Panel    3. Obstructive sleep apnea  She has a CPAP.    4. Insomnia, unspecified type  Stable on zolpidem.    5. Stroke Syndrome  Her stroke was back in 2013, so if she would have to come off of her aspirin, that would be reasonable however, given that she has MTH FR, I would prefer that she remain on this.    6. Hallux limitus of left foot  She will be having this treated surgically.        Surgical Procedure Risk: Low (reported cardiac risk generally < 1%)  Have you had prior anesthesia?: Yes  Have you or any family members had a previous anesthesia reaction:  No  Do you or any family members have a history of a clotting or bleeding disorder?: Yes: pt has MTHFR  Cardiac Risk Assessment: no increased risk for major cardiac complications    Patient approved for surgery with general or local anesthesia.    Please Note:  Patient uses a CPAP machine.    Functional Status: Independent  Patient plans to recover at home with family.     Subjective:      Maria Elena Sprague is a 67 y.o. female who presents for a preoperative consultation.  Maria Elena has a several year history of pain and a nodule over the joint on her left big toe.  She had surgery done for this on her right foot back in 2010 or 11.  In the last year, that bump has gotten bigger and she is having more pain now on the left foot.  She sees Dr. Wayne for this.  She has opted for surgical correction.    Her other past medical history is notable for a  stroke in 2013, MTHFR positive.  She has been without evidence of recurrence.  She also has hypertension, sleep apnea, hyperlipidemia.  These have all been stable.  She denies any chest pain or shortness of breath.  She is following a Weight [employer] diet now and is losing weight intentionally that way.    All other systems reviewed and are negative, other than those listed in the HPI.    Pertinent History  Do you have difficulty breathing or chest pain after walking up a flight of stairs: No  History of obstructive sleep apnea: Yes: pt uses CPAP machine  Steroid use in the last 6 months: No  Frequent Aspirin/NSAID use: Yes: pt takes 325 mg aspirin daily  Prior Blood Transfusion: No  Prior Blood Transfusion Reaction: No  If for some reason prior to, during or after the procedure, if it is medically indicated, would you be willing to have a blood transfusion?:  There is no transfusion refusal.    Current Outpatient Prescriptions   Medication Sig Dispense Refill     albuterol (PROVENTIL HFA;VENTOLIN HFA) 90 mcg/actuation inhaler Inhale 2 puffs every 6 (six) hours as needed for wheezing. 8.5 g 11     aspirin 325 MG tablet Take 325 mg by mouth at bedtime.        b complex vitamins tablet Take 1 tablet by mouth daily.       baclofen (LIORESAL) 10 MG tablet Take 10 mg by mouth 2 (two) times a day.        calcium carbonate (OS-TEREZA) 600 mg (1,500 mg) tablet Take 600 mg by mouth daily.       cholecalciferol, vitamin D3, (VITAMIN D3) 2,000 unit cap Take by mouth.       ciclopirox (PENLAC) 8 % solution APPLY TO AFFECTED AREA BY TOPICAL ROUTE DAILY AT BEDTIME OR 8 HRS BEFORE WASHING  2     gabapentin (NEURONTIN) 300 MG capsule Take 300 mg by mouth 4 (four) times a day.       inhalational spacing device Spcr Use with each use of the inhaler. 1 each 0     multivitamin with minerals (THERA-M) 9 mg iron-400 mcg Tab tablet Take 1 tablet by mouth daily.       omega 3-dha-epa-fish oil 1,200 (144-216) mg cap Take by mouth.        rosuvastatin (CRESTOR) 20 MG tablet Take 1 tablet (20 mg total) by mouth at bedtime. 90 tablet 3     traMADol (ULTRAM) 50 mg tablet 1-2 tabs PO Q 4-6 hours PRN pain 20 tablet 0     triamterene-hydrochlorothiazide (DYAZIDE) 37.5-25 mg per capsule TAKE 1 CAPSULE BY MOUTH DAILY. 90 capsule 3     tryptophan 500 mg Tab Take by mouth.       zolpidem (AMBIEN) 5 MG tablet TAKE 1 TABLET (5 MG TOTAL) BY MOUTH BEDTIME AS NEEDED FOR SLEEP. 135 tablet 0     triamterene-hydrochlorothiazide (DYAZIDE) 37.5-25 mg per capsule TAKE 1 CAPSULE BY MOUTH DAILY. 90 capsule 2     zolpidem (AMBIEN) 5 MG tablet TAKE 1 TABLET (5 MG TOTAL) BY MOUTH BEDTIME AS NEEDED FOR SLEEP. 135 tablet 0     No current facility-administered medications for this visit.         Allergies   Allergen Reactions     Influenza Virus Vaccine Whole Dizziness     Oxaprozin Swelling     Hands and feet, itching (daypro)       Patient Active Problem List   Diagnosis     Obstructive sleep apnea     Osteopenia     Hyperlipidemia     Hypertension     Stroke Syndrome     Insomnia     Obesity       Past Medical History:   Diagnosis Date     Arthritis     neck and back     Hyperlipidemia      Hypertension      Insomnia      Leg pain     since stroke     Low back pain      MTHFR mutation     per patient report, tested after stroke in 2013      Obesity      Osteopenia      Shortness of breath     with exertion     Sleep apnea     cpap     Stroke 2013    balance issue       Past Surgical History:   Procedure Laterality Date     APPENDECTOMY       big toe rt side Right      COLONOSCOPY  2009     NE REVISE MEDIAN N/CARPAL TUNNEL SURG Right 4/19/2017    Procedure: OPEN RIGHT CARPAL TUNNEL RELEASE WITH FLEXOR SYNOVECTOMY;  Surgeon: Kaushik Rodriguez MD;  Location: [facility];  Service: Hand       Social History     Social History     Marital status:      Spouse name: N/A     Number of children: N/A     Years of education: N/A     Occupational History     Not on file.  "    Social History Main Topics     Smoking status: Former Smoker     Packs/day: 1.00     Years: 2.00     Quit date: 10/27/1974     Smokeless tobacco: Never Used     Alcohol use 3.6 oz/week     6 Glasses of wine per week     Drug use: No     Sexual activity: Not on file     Other Topics Concern     Not on file     Social History Narrative       Patient Care Team:  Zee Rhodes MD as PCP - General          Objective:     Vitals:    04/03/18 1009   BP: 130/66   Pulse: 62   SpO2: 98%   Weight: 177 lb 2 oz (80.3 kg)   Height: 5' 4.75\" (1.645 m)       Physical Exam:  General Appearance: Alert, cooperative, no distress, appears stated age   Head: Normocephalic, without obvious abnormality, atraumatic   Eyes: PERRL, conjunctiva/corneas clear, EOM's intact   Throat: Lips, mucosa, and tongue normal; teeth and gums normal   Neck: Normal ROM   Lungs: Clear to auscultation bilaterally, respirations unlabored.   Heart: Regular rate and rhythm, S1 and S2 normal, no murmur, rub, or gallop,   Abdomen: Soft, non-tender, bowel sounds active, no masses, no organomegaly   Extremities: Extremities normal, atraumatic, no cyanosis or edema   Skin: Skin color, texture, turgor normal, no rashes or lesions   Neurologic: Normal       Patient Instructions   No trimaterene-hctz on the AM of surgery.  Your other meds should be fine to take with a sip of water on the AM of surgery.    If your pre-op paperwork allows you to stay on your aspirin, do that.  If you need to be off of it, that's fine too since your stroke was back in 2013.  Just resume it after surgery.    Today's labs will be available on [facility].  If you aren't signed up, then we'll mail them out.  If anything needs more immediate follow up, we'll also call.    Take care!      EKG:  NSR, rate 60, no acute changes    Labs:  Labs pending at this time.  Results will be reviewed when available.    Immunization History   Administered Date(s) Administered     DT (pediatric) 07/01/2002 "     Pneumo Conj 13-V (2010&after) 06/03/2015     Pneumo Polysac 23-V 08/17/2016     Tdap 12/16/2009     ZOSTER, LIVE 12/20/2010       Electronically signed by Zee Rhodes MD 04/03/18 10:11 AM

## 2021-06-17 NOTE — PATIENT INSTRUCTIONS - HE
Patient Instructions by Amy Mendoza MD at 12/19/2019  4:00 PM     Author: Amy Mendoza MD Service: -- Author Type: Physician    Filed: 12/19/2019  5:54 PM Encounter Date: 12/19/2019 Status: Addendum    : Amy Mendoza MD (Physician)    Related Notes: Original Note by Amy Mendoza MD (Physician) filed at 12/19/2019  5:53 PM       1. Keep well hydrated  2. May alternate Tylenol every 6 hours with ibuprofen every 6 hours as needed for fever or pain  3. Make a follow up appointment preferably in a week, but before 12/31.   4. If your symptoms are getting worse after 48 hours of antibiotics or you have any questions, call the clinic number  - it's answered 24/7    Patient Education     Sinusitis (Antibiotic Treatment)    The sinuses are air-filled spaces within the bones of the face. They connect to the inside of the nose. Sinusitis is an inflammation of the tissue that lines the sinuses. Sinusitis can occur during a cold. It can also happen due to allergies to pollens and other particles in the air. Sinusitis can cause symptoms of sinus congestion and a feeling of fullness. A sinus infection causes fever, headache, and facial pain. There is often green or yellow fluid draining from the nose or into the back of the throat (post-nasal drip). You have been given antibiotics to treat this condition.  Home care    Take the full course of antibiotics as instructed. Do not stop taking them, even when you feel better.    Drink plenty of water, hot tea, and other liquids. This may help thin nasal mucus. It also may help your sinuses drain fluids.    Heat may help soothe painful areas of your face. Use a towel soaked in hot water. Or,  the shower and direct the warm spray onto your face. Using a vaporizer along with a menthol rub at night may also help soothe symptoms.     An expectorant with guaifenesin may help thin nasal mucus and help your sinuses drain fluids.    You can use an  over-the-counter decongestant, unless a similar medicine was prescribed to you. Nasal sprays work the fastest. Use one that contains phenylephrine or oxymetazoline. First blow your nose gently. Then use the spray. Do not use these medicines more often than directed on the label. If you do, your symptoms may get worse. You may also take pills that contain pseudoephedrine. Dont use products that combine multiple medicines. This is because side effects may be increased. Read labels. You can also ask the pharmacist for help. (People with high blood pressure should not use decongestants. They can raise blood pressure.)    Over-the-counter antihistamines may help if allergies contributed to your sinusitis.      Do not use nasal rinses or irrigation during an acute sinus infection, unless your healthcare provider tells you to. Rinsing may spread the infection to other areas in your sinuses.    Use acetaminophen or ibuprofen to control pain, unless another pain medicine was prescribed to you. If you have chronic liver or kidney disease or ever had a stomach ulcer, talk with your healthcare provider before using these medicines. (Aspirin should never be taken by anyone under age 18 who is ill with a fever. It may cause severe liver damage.)    Don't smoke. This can make symptoms worse.  Follow-up care  Follow up with your healthcare provider or our staff if you are better in 1 week.  When to seek medical advice  Call your healthcare provider if any of these occur:    Facial pain or headache that gets worse    Stiff neck    Unusual drowsiness or confusion    Swelling of your forehead or eyelids    Vision problems, such as blurred or double vision    Fever of 100.4 F (38 C) or higher, or as directed by your healthcare provider    Seizure    Breathing problems    Symptoms don't go away in 10 days  Prevention  Here are steps you can take to help prevent an infection:    Keep good hand washing habits.    Dont have close contact  with people who have sore throats, colds, or other upper respiratory infections.    Dont smoke, and stay away from secondhand smoke.    Stay up to date with of your vaccines.  Date Last Reviewed: 11/1/2017 2000-2017 [employer]. [address] [place] [id number]. All rights reserved. This information is not intended as a substitute for professional medical care. Always follow your healthcare professional's instructions.           Patient Education     Acute Bronchitis (Adult)    Bronchitis is an infection of the air passages (bronchial tubes) in your lungs. It often occurs when you have a cold. This illness is contagious during the first few days and is spread through the air by coughing and sneezing, or by direct contact (touching the sick person and then touching your own eyes, nose, or mouth).  Symptoms of bronchitis include cough with mucus (phlegm) and low-grade fever. Bronchitis usually lasts 7 to 14 days. Mild cases can be treated with simple home remedies. More severe infection is treated with an antibiotic.  Home care  Follow these guidelines when caring for yourself at home:    If your symptoms are severe, rest at home for the first 2 to 3 days. When you go back to your usual activities, don't let yourself get too tired.    Do not smoke. Also avoid being exposed to secondhand smoke.    You may use over-the-counter medicines to control fever or pain, unless another medicine was prescribed. If you have chronic liver or kidney disease or have ever had a stomach ulcer or gastrointestinal bleeding, talk with your healthcare provider before using these medicines. Also talk to your provider if you are taking medicine to prevent blood clots. Aspirin should never be given to anyone younger than 18 years of age who is ill with a viral infection or fever. It may cause severe liver or brain damage.    Your appetite may be poor, so a light diet is fine. Avoid dehydration by drinking 6 to 8  glasses of fluids per day (such as water, soft drinks, sports drinks, juices, tea, or soup). Extra fluids will help loosen secretions in the nose and lungs.    Over-the-counter cough, cold, and sore-throat medicines will not shorten the length of the illness, but they may be helpful to reduce symptoms. (Note: Do not use decongestants if you have high blood pressure.)    Finish all antibiotic medicine. Do this even if you are feeling better after only a few days.  Follow-up care  Follow up with your healthcare provider, or as advised. If you had an X-ray or ECG (electrocardiogram), a specialist will review it. You will be notified of any new findings that may affect your care.  If you are age 65 or older, or if you have a chronic lung disease or condition that affects your immune system, or you smoke, ask your healthcare provider about getting a pneumococcal vaccine and a yearly flu shot (influenza vaccine).  When to seek medical advice  Call your healthcare provider right away if any of these occur:    Fever of 100.4 F (38 C) or higher, or as directed by your healthcare provider    Coughing up increased amounts of colored sputum    Weakness, drowsiness, headache, facial pain, ear pain, or a stiff neck  Call 911  Call 911 if any of these occur.    Coughing up blood    Worsening weakness, drowsiness, headache, or stiff neck    Trouble breathing, wheezing, or pain with breathing  Date Last Reviewed: 9/13/2015 2000-2017 [employer]. [address] [place] [id number]. All rights reserved. This information is not intended as a substitute for professional medical care. Always follow your healthcare professional's instructions.

## 2021-06-17 NOTE — ANESTHESIA PREPROCEDURE EVALUATION
Anesthesia Evaluation      Patient summary reviewed   No history of anesthetic complications     Airway   Mallampati: II  Neck ROM: full   Pulmonary - normal exam   (+) shortness of breath (with exertion), sleep apnea,                          Cardiovascular - normal exam  (+) hypertension, , hypercholesterolemia,      Neuro/Psych    (+) CVA (2013.  No residual.) ,     Endo/Other    (+) obesity,      GI/Hepatic/Renal - negative ROS      Other findings: Cervical disc disease.      Dental - normal exam                          Anesthesia Plan  Planned anesthetic: MAC    ASA 2     Anesthetic plan and risks discussed with: patient    Post-op plan: routine recovery

## 2021-06-21 ENCOUNTER — RECORDS - HEALTHEAST (OUTPATIENT)
Dept: ADMINISTRATIVE | Facility: OTHER | Age: 71
End: 2021-06-21

## 2021-06-21 ENCOUNTER — OFFICE VISIT (OUTPATIENT)
Dept: NEUROLOGY | Facility: CLINIC | Age: 71
End: 2021-06-21
Payer: COMMERCIAL

## 2021-06-21 VITALS
SYSTOLIC BLOOD PRESSURE: 155 MMHG | BODY MASS INDEX: 33.29 KG/M2 | WEIGHT: 195 LBS | DIASTOLIC BLOOD PRESSURE: 77 MMHG | HEART RATE: 68 BPM | HEIGHT: 64 IN

## 2021-06-21 DIAGNOSIS — M54.16 LUMBAR RADICULOPATHY: Chronic | ICD-10-CM

## 2021-06-21 DIAGNOSIS — I63.311 CEREBRAL INFARCTION DUE TO THROMBOSIS OF RIGHT MIDDLE CEREBRAL ARTERY (H): Primary | ICD-10-CM

## 2021-06-21 DIAGNOSIS — E72.12 HOMOZYGOUS FOR C677T POLYMORPHISM OF MTHFR (H): ICD-10-CM

## 2021-06-21 PROCEDURE — 99214 OFFICE O/P EST MOD 30 MIN: CPT | Performed by: PSYCHIATRY & NEUROLOGY

## 2021-06-21 RX ORDER — GABAPENTIN 300 MG/1
300 CAPSULE ORAL 4 TIMES DAILY
Qty: 360 CAPSULE | Refills: 3 | Status: SHIPPED | OUTPATIENT
Start: 2021-06-21 | End: 2021-11-10

## 2021-06-21 RX ORDER — BACLOFEN 10 MG/1
10 TABLET ORAL AT BEDTIME
Qty: 90 TABLET | Refills: 3 | Status: SHIPPED | OUTPATIENT
Start: 2021-06-21 | End: 2022-04-27

## 2021-06-21 ASSESSMENT — MIFFLIN-ST. JEOR: SCORE: 1384.51

## 2021-06-21 NOTE — PROGRESS NOTES
In person evaluation    Chief Complaint   Chronic lumbar radiculopathy  CVA  MTHFR        History of Present Illness:   5/6/2019, in person visit  4/6/2020, telephone visit  12/22/2020, telephone visit  6/21/2021, in person visit      Diagnosis  Homozygous MTHFR  History of stroke right centrum semiovale small vessel, October 2014  Risk factors for stroke hypertension/homozygous MTHFR/obstructive sleep apnea/dilated left atrium  Lumbar radiculopathy chronic/lumbar canal stenosis    Since I last saw her no hospitalizations  No major illnesses  No new injuries to her back  No stroke or TIA symptoms  Reviewed meds and side effects and benefits    Did have cataract surgery in April 2021 both eyes they are working well uses readers when is a little bit like print        A.  For past stroke, right centrum semiovale October 1, 2014 (2 cm in size)        Patient uses aspirin 325 mg once per day        Patient has MTHFR homozygous and takes a B complex        Patient is on a statin through her primary        HDL 97/LDL 88 (November 2020)        Treatment of risk factors/hypertension per primary MD         No further TIA or strokelike symptoms    B.  Lumbar canal stenosis lumbar radiculopathy         Past L4-L5 moderate lumbar canal stenosis on the left with left L5 nerve root irritation        Past L4-L5 bilateral steroid injection by Dr. Pemberton November 2017 did show improvement (previous injections 2015, 2016)        Last injections were done by Dr. Sunil Pemberton  and he is not around now    She has more trouble when she stands in line too long  She has more trouble if she is on an incline we have talked about using a walking stick  [place] is moving around a lot more it can aggravate her back  During Covid she was able to decrease the baclofen to every other night and thought she was okay but then once activities increase she had to get the baclofen every night    No new weakness  No new bowel or  "bladder problems  No new numbness    Pain in the back is across the lumbar sacral area  Goes down both legs about the same      C.  Mild to moderate central cervical canal stenosis and foraminal narrowing at C5-C6 cervical spine scans 2016 and 2015 April 2020 visit review  Last year had difficulty with the abdominal pain originally had surgery May 2, 2019 for the hernia then in the fall 2019 had more abdominal pain had a CT scan that was \"okay\"  Later she saw the surgeons again at the end of January 2020 and they said that there was a slight hole the need to be repaired  February 2020 underwent repair of her hernia again and the abdominal pain has improved  Eating okay no bowel or bladder difficulty    She was down at [facility] in [place] and walking and doing fairly well her leg symptoms are well controlled  She uses the gabapentin 300 mg capsule at least 3 times per day with 1 capsule as a rescue  Use of the baclofen 10 mg at nighttime  We will try to maybe taper down    Does not need to use a cane or any assistive device  Still on the B complex for her homozygous MTHFR factor  Takes the full aspirin  Is currently now on Crestor        She has the difficulty of:    1. Past history of stroke.  2. History of some lumbar canal stenosis.  3. Had some left foot surgery back in April 2018.  4. had a colonoscopy, had severe pain, underwent a hernia surgery on the left, did not have a perforation. For surgery May 2, 2019, second repeat surgery February 2020.        Current diagnoses/medical history:    1. Centrum semiovale stroke in October 01, 2014, 2 cm in size, increased reflexes on the left compared to the right, chronic in nature.  2. History of hypertension for risk factors of stroke.  3. Homozygous MTHFR factor.  4. Obstructive sleep apnea, does have a CPAP.  5. Echo showed dilated left atrium. Long-term heart monitoring showed no atrial fibrillation in the past.  6. MRA of [facility] and neck vessels " were adequate.  7. LDL 98, came down to 66 in 2014.  8. Nonsmoker.  9. CPKs were normal when we worked up some of her leg pain.  10.  Bilateral cataract replacement April 2021    Habits  Non-smoker    Family history  Sister with patent foramen ovale and stroke  Sister with ALS  Mother with dementia, ovarian cancer  Brother with prostate cancer  Father with colon cancer  Paternal grandfather with gastric cancer  Paternal uncle with melanoma/bladder cancer  Maternal uncle with gastric cancer  Multiple other relatives with cancer        Workup for leg pain included:    1. CPK normal.  2. MRI scan of the lumbar spine on May 05, 2015 showing:  a. Moderate paraspinal muscle loss consistent with chronic difficulties.  b. L4-L5 moderate lumbar canal stenosis on the left L5 nerve root more so than the right.  3. May 24, 2016 L4-L5 steroid injection bilaterally.  4. Injection by Dr. Pemberton in November 2017 showed improvement.  5. Previous injections in the back on December 10, 2015, which gave her some relief.  6.  EMG left lower extremity September 2014 normal    She has also had some cervical pain problems with MRI scan of the cervical spine on August 25, 2016 compared to May 01, 2015:  a. Mild-to-moderate central stenosis at C5-C6 with severe bilateral foraminal stenosis.  b. C3-C4 severe right and mild left foraminal stenosis.  c. C4-C5 severe left foraminal stenosis.  d. C6-C7 moderate right and mild-to-moderate left foraminal stenosis.    DEXA scan (1/10/2021) normal  Laboratory review November 23, 2020  HDL 97/LDL 88  Sodium 138 potassium 4.2  BUN 15 creatinine 0.68  Glucose 96          Review of Systems     No headache no chest pain no shortness of breath no nausea vomiting no diarrhea no fever chills  No bowel or bladder incontinence  No urgency  No weakness    Increased pain lumbar spine going down both legs posterior aspect  Worse with standing  Worse with long walking without a shopping cart  Worse when on an  yue see HPI above    Medications do help if she takes Lasix she notices symptoms more    Has a little bit of cognitive blunting with the Neurontin  Has a little bit of emotional blunting with the Neurontin  Did not seem to cry or get is emotional when her sister passed away    No diplopia dysarthria dysphagia    General exam  Blood pressure 155/77 pulse 68 temperature 97.8  HEENT normal, in the past she had a little bit blepharospasm not seen on today's evaluation  Lungs clear  Heart rate regular  Abdomen soft  Symmetrical pulses  No edema the feet  Negative straight leg raising sign      Neurologic exam  Alert oriented x3  Normal prosody of speech  Normal naming  Normal comprehension  Normal repetition  No aphasia  No neglect  Memory recall okay    Cranial 2 through 12 normal  No ophthalmoplegia  No nystagmus  Tongue twisters good  Face symmetrical  Visual fields intact    Upper extremities  No specific drift  Finger-nose good  No tremor    Lower extremity  Distal proximal strength good    Reflexes  Slightly brisker on the left than the right  Toes downgoing  No Hoover reflex    Gait  Gets up from the chair pushing off with one hand  Ambulates on the flat okay  Negative Romberg              Assessment/Plan     1.  Chronic lumbar radiculopathy (M54.16)         Neurontin 300 mg capsule 4 times daily (holds back 1 dose as a rescue)        Baclofen 10 mg tablet nightly we will consider using every other night or tapering down      2.  CVA (Cerebral Vascular Accident) (I63.311)         Aspirin 325 mg daily         B complex, folate, B6, B12 for her MTHFR         Crestor through her primary         Risk factor reduction per primary    3.  Homozygous MTHFR mutation C677T (E72.12)         B complex as above     4.  Cervical canal stenosis mild to moderate C5-6 with some foraminal disease        Current plan:    1. Continue gabapentin 300 mg four per day for her back discomfort.  2. Baclofen 10 mg one tablet twice  daily, takes mainly just at night see if the other tablet as a rescue  3. Has had injections by Dr. Sunil Pemberton in the past for her low back which helped in the past.  4. Had a carpal tunnel syndrome fixed by Dr. Rodriguez.  5. Is on a B-Complex, folate, B6, and B12 for MTHFR difficulties.  6. Should stay on the aspirin 325 mg tablet and the Crestor for her stroke prevention.  7. Follow-up in 1 year sooner if there is problems     Likes to follow-up in April 2022 [place] the [place]      Total care time today 38 minutes discussed multiple neurologic difficulties including CVA and clotting disorder MTHFR, lumbar back and spinal disease in the neck medication side effects and benefits.

## 2021-06-21 NOTE — PROGRESS NOTES
Assessment and Plan:     1. Insomnia  Doing well with zolpidem.  Controlled substance agreement is updated.  - zolpidem (AMBIEN) 5 MG tablet; Take 1 tablet (5 mg total) by mouth at bedtime as needed for sleep.  Dispense: 90 tablet; Refill: 0    2. Essential hypertension  Her blood pressure is well controlled.  We will be checking electrolytes today.  - triamterene-hydrochlorothiazide (DYAZIDE) 37.5-25 mg per capsule; Take 1 capsule by mouth daily.  Dispense: 90 capsule; Refill: 2  - rosuvastatin (CRESTOR) 20 MG tablet; Take 1 tablet (20 mg total) by mouth daily.  Dispense: 90 tablet; Refill: 3  - Comprehensive Metabolic Panel    3. Hyperlipidemia  She has done well with Crestor 20 mg daily.  We will check her leg along with liver function tests.  - rosuvastatin (CRESTOR) 20 MG tablet; Take 1 tablet (20 mg total) by mouth daily.  Dispense: 90 tablet; Refill: 3  - Comprehensive Metabolic Panel  - Lipid Cascade    4. Hypertension  See above.    - triamterene-hydrochlorothiazide (DYAZIDE) 37.5-25 mg per capsule; Take 1 capsule by mouth daily.  Dispense: 90 capsule; Refill: 2  - rosuvastatin (CRESTOR) 20 MG tablet; Take 1 tablet (20 mg total) by mouth daily.  Dispense: 90 tablet; Refill: 3  - Comprehensive Metabolic Panel    5. Stroke Syndrome  She has done very well.  She has been without evidence of recurrence.  She continues on medical management for secondary prevention.  she is on a low-dose daily aspirin at bedtime.  - rosuvastatin (CRESTOR) 20 MG tablet; Take 1 tablet (20 mg total) by mouth daily.  Dispense: 90 tablet; Refill: 3    6. Routine general medical examination at a health care facility  She is up-to-date on age-appropriate cancer screenings and immunizations.    7. Family history of ovarian cancer  She follows annually with Dr. Ayoub.  She has been getting annual ultrasound and Ca1 25.  Those will be ordered today.  - US Pelvis With Transvaginal Non OB; Future  -  (Cancer Antigen 125)        The  patient's current medical problems were reviewed.    The following high BMI interventions were performed this visit: weight monitoring  The following health maintenance schedule was reviewed with the patient and provided in printed form in the after visit summary:   Health Maintenance   Topic Date Due     FALL RISK ASSESSMENT  10/23/2019     MAMMOGRAM  11/06/2019     DXA SCAN  11/20/2019     TD 18+ HE  12/16/2019     ADVANCE DIRECTIVES DISCUSSED WITH PATIENT  10/23/2023     COLONOSCOPY  11/11/2024     PNEUMOCOCCAL POLYSACCHARIDE VACCINE AGE 65 AND OVER  Completed     PNEUMOCOCCAL CONJUGATE VACCINE FOR ADULTS (PCV13 OR PREVNAR)  Completed     ZOSTER VACCINE  Completed        Subjective:   Chief Complaint: Maria Elena Sprague is an 68 y.o. female here for an Annual Wellness visit.   HPI: Maria Elena is a very pleasant 6-year-old female here today for her annual exam.  She mentioned struggles with constipation.  There is a lump on the left side of her abdomen that will be more prominent when she is constipated.  She uses smooth move tea and that is helpful.  She used magnesium but got a lot of gas from it.  As an aside, it helped her legs.  She will be trying the liquid form to see if that would be easier for her stomach.    She has the family history of ovarian cancer.  She gets annual CA-125 and pelvic ultrasounds.  She then follows up with Dr. Ayoub.    In April, she had her left great toe joint replaced.  She is very happy with that.    She had a fungal infection in her left thumbnail.  She had that removed.  She sees advanced dermatology and [facility] for that.    She had an allergic reaction to itraconazole.  In the emergency department, she was given Tagamet and that worked very well for her.    She uses zolpidem for insomnia and that works very well for her.    She is retired.  She walks or does an exercise tape.  She is .  She has 6 or 7 alcoholic beverages per week.    Review of Systems:  Resp cough  : incont  MSK:  back/neck troubel   Please see above.  The rest of the review of systems are negative for all systems.    Patient Care Team:  Zee Rhodes MD as PCP - General     Patient Active Problem List   Diagnosis     Obstructive sleep apnea     Osteopenia     Hyperlipidemia     Hypertension     Stroke Syndrome     Insomnia     Obesity     Hallux limitus of left foot     Past Medical History:   Diagnosis Date     Arthritis     neck and back     Hallux limitus of left foot      Hyperlipidemia      Hypertension      Insomnia      Leg pain     since stroke     Low back pain      MTHFR mutation (H)     per patient report, tested after stroke in       Obesity      Osteopenia      Shortness of breath     with exertion     Sleep apnea     cpap     Stroke (H) 2013    balance issue      Past Surgical History:   Procedure Laterality Date     APPENDECTOMY       big toe rt side Right      COLONOSCOPY       IA REVISE MEDIAN N/CARPAL TUNNEL SURG Right 2017    Procedure: OPEN RIGHT CARPAL TUNNEL RELEASE WITH FLEXOR SYNOVECTOMY;  Surgeon: Kaushik Rodriguez MD;  [facility]: [facility];  Service: Hand      Family History   Problem Relation Age of Onset     Ovarian cancer Mother      late 70s     Stroke Mother      Colon cancer Father      late 60s     Prostate cancer Brother 56     Pancreatic cancer Maternal Aunt 82     Stomach cancer Maternal Uncle      80s     Cancer Paternal Uncle      melanoma and bladder in 80s     Skin cancer Maternal Grandmother      Colon cancer Maternal Grandfather      early 80s     Stomach cancer Paternal Grandfather      60s     Stomach cancer Other 81     paternal-maternal great-aunt     Kidney cancer Other      paternal side,  at 57     Colon cancer Other 52     paternal side     Stomach cancer Other      paternal side,  at 54     Leukemia Other      paternal side, late 50s     Breast cancer Other      maternal first-cousin, late 40s     Lymphoma Other 74     paternal side, MALT      Lymphoma Other      paternal side,  at 50     Stomach cancer Other 84     paternal side     Cancer Other      paternal side, breast and stomach cancer     Uterine cancer Other 78     paternal side     Cancer Other 83     paternal side, prostate and bone cancer     Leukemia Other      paternal side, 80s     Lung cancer Other      paternal side, 90s     Lung cancer Maternal Aunt      80s     Skin cancer Maternal Uncle      Cancer Maternal Uncle      stomach and colon cancer in 60s     Cancer Maternal Uncle      pituitary tumor 50s     Breast cancer Paternal Aunt      Age in 70's     Breast cancer Paternal Aunt      Age in 70's     Stroke Sister       Social History     Social History     Marital status:      Spouse name: N/A     Number of children: N/A     Years of education: N/A     Occupational History     Not on file.     Social History Main Topics     Smoking status: Former Smoker     Packs/day: 1.00     Years: 2.00     Quit date: 10/27/1974     Smokeless tobacco: Never Used     Alcohol use 3.6 oz/week     6 Glasses of wine per week     Drug use: No     Sexual activity: Not on file     Other Topics Concern     Not on file     Social History Narrative      Current Outpatient Prescriptions   Medication Sig Dispense Refill     albuterol (PROVENTIL HFA;VENTOLIN HFA) 90 mcg/actuation inhaler Inhale 2 puffs every 6 (six) hours as needed for wheezing. 8.5 g 11     aspirin 325 MG tablet Take 325 mg by mouth at bedtime.        b complex vitamins tablet Take 1 tablet by mouth daily.       baclofen (LIORESAL) 10 MG tablet Take 10 mg by mouth 2 (two) times a day.        cholecalciferol, vitamin D3, (VITAMIN D3) 2,000 unit cap Take 2,000 Units by mouth daily.        gabapentin (NEURONTIN) 300 MG capsule Take 300 mg by mouth 4 (four) times a day.       melatonin 3 mg Tab tablet Take 3 mg by mouth at bedtime as needed.       multivitamin with minerals (THERA-M) 9 mg iron-400 mcg Tab tablet Take 1 tablet by mouth  "daily.       omega 3-dha-epa-fish oil 1,200 (144-216) mg cap Take 1,200 mg by mouth daily.        rosuvastatin (CRESTOR) 20 MG tablet Take 1 tablet (20 mg total) by mouth daily. 90 tablet 3     triamterene-hydrochlorothiazide (DYAZIDE) 37.5-25 mg per capsule Take 1 capsule by mouth daily. 90 capsule 2     zolpidem (AMBIEN) 5 MG tablet Take 1 tablet (5 mg total) by mouth at bedtime as needed for sleep. 90 tablet 0     No current facility-administered medications for this visit.       Objective:   Vital Signs:   Visit Vitals     /70 (Patient Site: Right Arm, Patient Position: Sitting, Cuff Size: Adult Large)     Pulse 64     Ht 5' 4\" (1.626 m)     Wt 175 lb 6.4 oz (79.6 kg)     BMI 30.11 kg/m2        VisionScreening:  No exam data present     PHYSICAL EXAM:  Constitutional:  Reveals an alert, pleasant adult female.   Vitals:  Noted.   Ears: TM's normal bilaterally   Eyes: PERRL, conjunctiva/corneas clear, EOM's intact   Throat: Lips, mucosa, and tongue normal; teeth and gums normal   Neck: Normal ROM, no carotid bruits, no thyromegaly   Lungs: Clear to auscultation bilaterally, respirations unlabored.   Breast exam:  Normal skin overlying her breasts bilaterally no discrete nodule is palpable in the axilla bilaterally  Heart: Regular rate and rhythm, S1 and S2 normal, no murmur, rub, or gallop,   Abdomen: Soft, non-tender, bowel sounds active, no masses, no organomegaly   Extremities: Extremities normal, atraumatic, no cyanosis or edema   Pelvic:Not examined  Skin: Skin color, texture, turgor normal, no rashes or lesions   Neurologic: grossly normal       Assessment Results 10/23/2018   Activities of Daily Living No help needed   Instrumental Activities of Daily Living No help needed   Mini Cog Total Score 5   Some recent data might be hidden     A Mini-Cog score of 0-2 suggests the possibility of dementia, score of 3-5 suggests no dementia    Identified Health Risks:     Patient's advanced directive was " discussed and I am comfortable with the patient's wishes.

## 2021-06-21 NOTE — LETTER
6/21/2021         RE: Maria Elena Sprague  [address] [facility] [address]        Dear Colleague,    Thank you for referring your patient, Maria Elena Sprague, to the [facility] [place]. Please see a copy of my visit note below.    In person evaluation    Chief Complaint   Chronic lumbar radiculopathy  CVA  MTHFR        History of Present Illness:   5/6/2019, in person visit  4/6/2020, telephone visit  12/22/2020, telephone visit  6/21/2021, in person visit      Diagnosis  Homozygous MTHFR  History of stroke right centrum semiovale small vessel, October 2014  Risk factors for stroke hypertension/homozygous MTHFR/obstructive sleep apnea/dilated left atrium  Lumbar radiculopathy chronic/lumbar canal stenosis    Since I last saw her no hospitalizations  No major illnesses  No new injuries to her back  No stroke or TIA symptoms  Reviewed meds and side effects and benefits    Did have cataract surgery in April 2021 both eyes they are working well uses readers when is a little bit like print        A.  For past stroke, right centrum semiovale October 1, 2014 (2 cm in size)        Patient uses aspirin 325 mg once per day        Patient has MTHFR homozygous and takes a B complex        Patient is on a statin through her primary        HDL 97/LDL 88 (November 2020)        Treatment of risk factors/hypertension per primary MD         No further TIA or strokelike symptoms    B.  Lumbar canal stenosis lumbar radiculopathy         Past L4-L5 moderate lumbar canal stenosis on the left with left L5 nerve root irritation        Past L4-L5 bilateral steroid injection by Dr. Pemberton November 2017 did show improvement (previous injections 2015, 2016)        Last injections were done by Dr. Sunil Pemberton  and he is not around now    She has more trouble when she stands in line too long  She has more trouble if she is on an incline we have talked about using a walking stick  [place]  "around a lot more it can aggravate her back  During Covid she was able to decrease the baclofen to every other night and thought she was okay but then once activities increase she had to get the baclofen every night    No new weakness  No new bowel or bladder problems  No new numbness    Pain in the back is across the lumbar sacral area  Goes down both legs about the same      C.  Mild to moderate central cervical canal stenosis and foraminal narrowing at C5-C6 cervical spine scans 2016 and 2015 April 2020 visit review  Last year had difficulty with the abdominal pain originally had surgery May 2, 2019 for the hernia then in the fall 2019 had more abdominal pain had a CT scan that was \"okay\"  Later she saw the surgeons again at the end of January 2020 and they said that there was a slight hole the need to be repaired  February 2020 underwent repair of her hernia again and the abdominal pain has improved  Eating okay no bowel or bladder difficulty    She was down at [facility] in [place] and walking and doing fairly well her leg symptoms are well controlled  She uses the gabapentin 300 mg capsule at least 3 times per day with 1 capsule as a rescue  Use of the baclofen 10 mg at nighttime  We will try to maybe taper down    Does not need to use a cane or any assistive device  Still on the B complex for her homozygous MTHFR factor  Takes the full aspirin  Is currently now on Crestor        She has the difficulty of:    1. Past history of stroke.  2. History of some lumbar canal stenosis.  3. Had some left foot surgery back in April 2018.  4. had a colonoscopy, had severe pain, underwent a hernia surgery on the left, did not have a perforation. For surgery May 2, 2019, second repeat surgery February 2020.        Current diagnoses/medical history:    1. Centrum semiovale stroke in October 01, 2014, 2 cm in size, increased reflexes on the left compared to the right, chronic in nature.  2. History of hypertension for " risk factors of stroke.  3. Homozygous MTHFR factor.  4. Obstructive sleep apnea, does have a CPAP.  5. Echo showed dilated left atrium. Long-term heart monitoring showed no atrial fibrillation in the past.  6. MRA of [place] of [place] and neck vessels were adequate.  7. LDL 98, came down to 66 in 2014.  8. Nonsmoker.  9. CPKs were normal when we worked up some of her leg pain.  10.  Bilateral cataract replacement April 2021    Habits  Non-smoker    Family history  Sister with patent foramen ovale and stroke  Sister with ALS  Mother with dementia, ovarian cancer  Brother with prostate cancer  Father with colon cancer  Paternal grandfather with gastric cancer  Paternal uncle with melanoma/bladder cancer  Maternal uncle with gastric cancer  Multiple other relatives with cancer        Workup for leg pain included:    1. CPK normal.  2. MRI scan of the lumbar spine on May 05, 2015 showing:  a. Moderate paraspinal muscle loss consistent with chronic difficulties.  b. L4-L5 moderate lumbar canal stenosis on the left L5 nerve root more so than the right.  3. May 24, 2016 L4-L5 steroid injection bilaterally.  4. Injection by Dr. Pemberton in November 2017 showed improvement.  5. Previous injections in the back on December 10, 2015, which gave her some relief.  6.  EMG left lower extremity September 2014 normal    She has also had some cervical pain problems with MRI scan of the cervical spine on August 25, 2016 compared to May 01, 2015:  a. Mild-to-moderate central stenosis at C5-C6 with severe bilateral foraminal stenosis.  b. C3-C4 severe right and mild left foraminal stenosis.  c. C4-C5 severe left foraminal stenosis.  d. C6-C7 moderate right and mild-to-moderate left foraminal stenosis.    DEXA scan (1/10/2021) normal  Laboratory review November 23, 2020  HDL 97/LDL 88  Sodium 138 potassium 4.2  BUN 15 creatinine 0.68  Glucose 96          Review of Systems     No headache no chest pain no shortness of breath no nausea  vomiting no diarrhea no fever chills  No bowel or bladder incontinence  No urgency  No weakness    Increased pain lumbar spine going down both legs posterior aspect  Worse with standing  Worse with long walking without a shopping cart  Worse when on an incline see HPI above    Medications do help if she takes Lasix she notices symptoms more    Has a little bit of cognitive blunting with the Neurontin  Has a little bit of emotional blunting with the Neurontin  Did not seem to cry or get is emotional when her sister passed away    No diplopia dysarthria dysphagia    General exam  Blood pressure 155/77 pulse 68 temperature 97.8  HEENT normal, in the past she had a little bit blepharospasm not seen on today's evaluation  Lungs clear  Heart rate regular  Abdomen soft  Symmetrical pulses  No edema the feet  Negative straight leg raising sign      Neurologic exam  Alert oriented x3  Normal prosody of speech  Normal naming  Normal comprehension  Normal repetition  No aphasia  No neglect  Memory recall okay    Cranial 2 through 12 normal  No ophthalmoplegia  No nystagmus  Tongue twisters good  Face symmetrical  Visual fields intact    Upper extremities  No specific drift  Finger-nose good  No tremor    Lower extremity  Distal proximal strength good    Reflexes  Slightly brisker on the left than the right  Toes downgoing  No Hoover reflex    Gait  Gets up from the chair pushing off with one hand  Ambulates on the flat okay  Negative Romberg              Assessment/Plan     1.  Chronic lumbar radiculopathy (M54.16)         Neurontin 300 mg capsule 4 times daily (holds back 1 dose as a rescue)        Baclofen 10 mg tablet nightly we will consider using every other night or tapering down      2.  CVA (Cerebral Vascular Accident) (I63.311)         Aspirin 325 mg daily         B complex, folate, B6, B12 for her MTHFR         Crestor through her primary         Risk factor reduction per primary    3.  Homozygous MTHFR mutation  C677T (E72.12)         B complex as above     4.  Cervical canal stenosis mild to moderate C5-6 with some foraminal disease        Current plan:    1. Continue gabapentin 300 mg four per day for her back discomfort.  2. Baclofen 10 mg one tablet twice daily, takes mainly just at night see if the other tablet as a rescue  3. Has had injections by Dr. Sunil Pemberton in the past for her low back which helped in the past.  4. Had a carpal tunnel syndrome fixed by Dr. Rodriguez.  5. Is on a B-Complex, folate, B6, and B12 for MTHFR difficulties.  6. Should stay on the aspirin 325 mg tablet and the Crestor for her stroke prevention.  7. Follow-up in 1 year sooner if there is problems     Likes to follow-up in April 2022 [place] the [place]      Total care time today 38 minutes discussed multiple neurologic difficulties including CVA and clotting disorder MTHFR, lumbar back and spinal disease in the neck medication side effects and benefits.          Again, thank you for allowing me to participate in the care of your patient.        Sincerely,        Julito Silvestre MD

## 2021-06-21 NOTE — NURSING NOTE
Chief Complaint   Patient presents with     Follow Up     Lumbar radiculopathy     Annette Watts LPN on 6/21/2021 at 11:11 AM

## 2021-06-21 NOTE — LETTER
Letter by Flor Correa MD at      Author: Flor Correa MD Service: -- Author Type: --    Filed:  Encounter Date: 11/25/2020 Status: (Other)         Maria Elena Sprague  [address] [facility] [address]         November 25, 2020         Dear Ms. Sprague,    Below are the results from your recent visit:    Resulted Orders   Basic Metabolic Panel   Result Value Ref Range    Sodium 138 136 - 145 mmol/L    Potassium 4.2 3.5 - 5.0 mmol/L    Chloride 100 98 - 107 mmol/L    CO2 27 22 - 31 mmol/L    Anion Gap, Calculation 11 5 - 18 mmol/L    Glucose 96 70 - 125 mg/dL    Calcium 9.6 8.5 - 10.5 mg/dL    BUN 15 8 - 28 mg/dL    Creatinine 0.68 0.60 - 1.10 mg/dL    GFR MDRD Af Amer >60 >60 mL/min/1.73m2    GFR MDRD Non Af Amer >60 >60 mL/min/1.73m2    Narrative    Fasting Glucose reference range is 70-99 mg/dL per  American Diabetes Association (ADA) guidelines.   Vitamin D, Total (25-Hydroxy)   Result Value Ref Range    Vitamin D, Total (25-Hydroxy) 59.0 30.0 - 80.0 ng/mL    Narrative    Deficiency <10.0 ng/mL  Insufficiency 10.0-29.9 ng/mL  Sufficiency 30.0-80.0 ng/mL  Toxicity (possible) >100.0 ng/mL                It was so nice to meet you the other day in clinic.  Your labs are back.  Your vitamin D, electrolytes,  kidney function, and cholesterol levels look good.      Please let me know if you any questions or concerns,       Sincerely,        Electronically signed by Flor Correa MD

## 2021-06-22 NOTE — PROGRESS NOTES
WALK IN CARE - VISIT NOTE    HPI    67 yo female presenting for evaluation of:    1. LLQ pain  - several months  - no fevers/chills/sweats  - intermittent nausea after eating  - pain worse after eating  - no blood in stool  - patient has BM every few days and has to strain very hard  - used to take miralax but does not use any other meds for constipation       ROS  Negative except as noted in HPI    OBJECTIVE    Vitals  Vitals:    12/27/18 1612   BP: 136/64   Pulse: 67   Resp: 20   Temp: 99.1  F (37.3  C)   SpO2: 98%     Physical Exam  General: No acute distress  CV: RRR, distal and peripheral pulses in tact  Resp: CTA bilaterally, no wheezing, no rhonchi, no rhales, no respiratory distress  Abdomen: soft, LLQ TTP, normal bowel sounds  Extrem: no edema, no cyanosis, well-perfused    Labs  BMP wnl  CBC wnl  CRP wnl  UA wnl    Imaging  XR abdomen showing stool          ASSESSMENT/PLAN      # Constipation   1. Colace - 100 mg (1 tab) twice a day every day  2. Take miralax once a day   3. Utilize glycerin suppository today.   4. If at any point, you feel light headed/dizzy or feel like you are dehydrated, go to the ER.

## 2021-06-22 NOTE — PROGRESS NOTES
Subjective:      Patient ID: Maria Elena Sprague is a 68 y.o. female.    Chief Complaint:    Abdominal Pain   This is a new problem. The current episode started today. The onset quality is gradual. The problem occurs constantly. The most recent episode lasted 4 hours. The problem has been unchanged. The pain is located in the LLQ. The pain is at a severity of 7/10. The pain is moderate. The quality of the pain is aching and cramping. The abdominal pain does not radiate. Pertinent negatives include no anorexia, arthralgias, belching, constipation, diarrhea, dysuria, fever, flatus, frequency, headaches, hematochezia, hematuria, melena, myalgias, nausea, vomiting or weight loss. The pain is aggravated by movement. The pain is relieved by nothing. She has tried nothing for the symptoms.         Past Medical History:   Diagnosis Date     Arthritis     neck and back     Hallux limitus of left foot      Hyperlipidemia      Hypertension      Insomnia      Leg pain     since stroke     Low back pain      MTHFR mutation (H)     per patient report, tested after stroke in 2013      Obesity      Osteopenia      Shortness of breath     with exertion     Sleep apnea     cpap     Stroke (H) 2013    balance issue       Past Surgical History:   Procedure Laterality Date     APPENDECTOMY       big toe rt side Right      COLONOSCOPY  2009     AR REVISE MEDIAN N/CARPAL TUNNEL SURG Right 4/19/2017    Procedure: OPEN RIGHT CARPAL TUNNEL RELEASE WITH FLEXOR SYNOVECTOMY;  Surgeon: Kaushik Rodriguez MD;  [facility]: [facility];  Service: Hand       Family History   Problem Relation Age of Onset     Ovarian cancer Mother         late 70s     Stroke Mother      Colon cancer Father         late 60s     Prostate cancer Brother 56     Pancreatic cancer Maternal Aunt 82     Stomach cancer Maternal Uncle         80s     Cancer Paternal Uncle         melanoma and bladder in 80s     Skin cancer Maternal Grandmother      Colon cancer Maternal  Grandfather         early 80s     Stomach cancer Paternal Grandfather         60s     Stomach cancer Other 81        paternal-maternal great-aunt     Kidney cancer Other         paternal side,  at 57     Colon cancer Other 52        paternal side     Stomach cancer Other         paternal side,  at 54     Leukemia Other         paternal side, late 50s     Breast cancer Other         maternal first-cousin, late 40s     Lymphoma Other 74        paternal side, MALT     Lymphoma Other         paternal side,  at 50     Stomach cancer Other 84        paternal side     Cancer Other         paternal side, breast and stomach cancer     Uterine cancer Other 78        paternal side     Cancer Other 83        paternal side, prostate and bone cancer     Leukemia Other         paternal side, 80s     Lung cancer Other         paternal side, 90s     Lung cancer Maternal Aunt         80s     Skin cancer Maternal Uncle      Cancer Maternal Uncle         stomach and colon cancer in 60s     Cancer Maternal Uncle         pituitary tumor 50s     Breast cancer Paternal Aunt         Age in 70's     Breast cancer Paternal Aunt         Age in 70's     Stroke Sister        Social History     Tobacco Use     Smoking status: Former Smoker     Packs/day: 1.00     Years: 2.00     Pack years: 2.00     Last attempt to quit: 10/27/1974     Years since quittin.1     Smokeless tobacco: Never Used   Substance Use Topics     Alcohol use: Yes     Alcohol/week: 3.6 oz     Types: 6 Glasses of wine per week     Drug use: No       Review of Systems   Constitutional: Negative for fever and weight loss.   Gastrointestinal: Positive for abdominal pain. Negative for anal bleeding, anorexia, blood in stool, constipation, diarrhea, flatus, hematochezia, melena, nausea, rectal pain and vomiting.        Abdominal bloating on the left side   Genitourinary: Negative for dysuria, frequency and hematuria.   Musculoskeletal: Negative for arthralgias and  myalgias.   Neurological: Negative for headaches.   All other systems reviewed and are negative.      Objective:     /70 (Patient Site: Right Arm, Patient Position: Sitting, Cuff Size: Adult Large)   Pulse (!) 58   Temp 97.9  F (36.6  C) (Oral)   Resp 20   Wt 177 lb (80.3 kg)   SpO2 100%   BMI 30.38 kg/m      Physical Exam   Constitutional: She is oriented to person, place, and time. She appears well-developed and well-nourished. No distress.   HENT:   Head: Normocephalic and atraumatic.   Eyes: Conjunctivae and EOM are normal. Pupils are equal, round, and reactive to light.   Cardiovascular: Normal rate, regular rhythm, normal heart sounds and intact distal pulses.   No murmur heard.  Pulmonary/Chest: Effort normal and breath sounds normal. No respiratory distress.   Abdominal: Soft. Bowel sounds are normal. She exhibits distension. She exhibits no mass. There is tenderness. There is no rebound and no guarding.   Musculoskeletal: Normal range of motion. She exhibits no edema.   Neurological: She is alert and oriented to person, place, and time.   Skin: Skin is warm and dry. No rash noted.   Psychiatric: She has a normal mood and affect. Her behavior is normal. Judgment and thought content normal.   Nursing note and vitals reviewed.    XR ABD: Moderate stool in the colon.  Otherwise normal.  Assessment:     Procedures    The encounter diagnosis was Abdominal pain, left lower quadrant.  Due to slow transit constipation  Plan:     1.  Your diagnosis is left lower quadrant abdominal pain due to slow transit constipation  2.  I recommend MiraLAX 4 ounces in 24 ounces of water taken over 1/2-hour  3.  If this does not solve the problem follow-up with your primary provider early next week

## 2021-06-23 NOTE — PATIENT INSTRUCTIONS - HE
We'll have you meet with our specialty  to set up your colonoscopy.    If you get a fever/chills or increased head pain, then I recommend taking the antibiotic.    Take care!

## 2021-06-23 NOTE — PROGRESS NOTES
ASSESSMENT and PLAN:  1. Wheezing  With her respiratory infection, refilled her albuterol.  - albuterol (PROAIR HFA;PROVENTIL HFA;VENTOLIN HFA) 90 mcg/actuation inhaler; Inhale 2 puffs every 6 (six) hours as needed for wheezing.  Dispense: 8.5 g; Refill: 11    2. Abdominal pain, left lower quadrant  With her ongoing symptoms, I would like to get a colonoscopy.  Her CT scan was negative for any acute process.  She is coming up on her 5-year kodak anyway.  She agrees with this.  She will set this up for when she returns from [place].  - Ambulatory referral for Colonoscopy    3. Special screening for malignant neoplasms, colon  She is due for her 5-year screening this November.  Were going to get that a little bit earlier as she has had some abdominal discomfort.  - Ambulatory referral for Colonoscopy        Medications Discontinued During This Encounter   Medication Reason     albuterol (PROVENTIL HFA;VENTOLIN HFA) 90 mcg/actuation inhaler Reorder     magnesium hydroxide (MILK OF MAG) 400 mg/5 mL Susp suspension Therapy completed     albuterol (PROAIR HFA;PROVENTIL HFA;VENTOLIN HFA) 90 mcg/actuation inhaler Reorder       Return in about 6 months (around 7/17/2019).    Patient Instructions   We'll have you meet with our specialty  to set up your colonoscopy.    If you get a fever/chills or increased head pain, then I recommend taking the antibiotic.    Take care!      CHIEF COMPLAINT:  Chief Complaint   Patient presents with     Follow-up     CT scan - still has intermittent LLQ/side pain - still has a lump in that area too - wondering what the next steps are     Nasal Congestion     x 2 weeks, no fever, no other symptoms - has been using OTC nasal spray       HISTORY OF PRESENT ILLNESS:  Maria Elena Sprague is a 68 y.o. female  presenting to the clinic today for ongoing evaluation of intermittent left-sided abdominal pain.  She struggles with constipation and it seems that the pain is less when she is having more  regular and complete bowel movements.  Unfortunately, she has not been able to fully regulate her bowels yet.  She recently discovered that magnesium seems to be the most beneficial for her.  She is working on titrating that.  Currently, her pain is lower.  When it is more severe, she notes a bulging in her abdominal area on the left side.  She has had a pelvic ultrasound as well as CT of her abdomen and pelvis and nothing intra-abdominal or other has been seen as a cause for her symptoms.  We reviewed her last colonoscopy.  It was 5 years ago this November.    She is also been dealing with a upper respiratory infection.  She is got some nasal congestion.  She is use some over-the-counter nasal sprays and that has helped.    REVIEW OF SYSTEMS:    All other systems are negative.    PFSH:  Family History   Problem Relation Age of Onset     Ovarian cancer Mother         late 70s     Stroke Mother      Colon cancer Father         late 60s     Prostate cancer Brother 56     Pancreatic cancer Maternal Aunt 82     Stomach cancer Maternal Uncle         80s     Cancer Paternal Uncle         melanoma and bladder in 80s     Skin cancer Maternal Grandmother      Colon cancer Maternal Grandfather         early 80s     Stomach cancer Paternal Grandfather         60s     Stomach cancer Other 81        paternal-maternal great-aunt     Kidney cancer Other         paternal side,  at 57     Colon cancer Other 52        paternal side     Stomach cancer Other         paternal side,  at 54     Leukemia Other         paternal side, late 50s     Breast cancer Other         maternal first-cousin, late 40s     Lymphoma Other 74        paternal side, MALT     Lymphoma Other         paternal side,  at 50     Stomach cancer Other 84        paternal side     Cancer Other         paternal side, breast and stomach cancer     Uterine cancer Other 78        paternal side     Cancer Other 83        paternal side, prostate and bone cancer      Leukemia Other         paternal side, 80s     Lung cancer Other         paternal side, 90s     Lung cancer Maternal Aunt         80s     Skin cancer Maternal Uncle      Cancer Maternal Uncle         stomach and colon cancer in 60s     Cancer Maternal Uncle         pituitary tumor 50s     Breast cancer Paternal Aunt         Age in 70's     Breast cancer Paternal Aunt         Age in 70's     Stroke Sister          TOBACCO USE:  Social History     Tobacco Use   Smoking Status Former Smoker     Packs/day: 1.00     Years: 2.00     Pack years: 2.00     Last attempt to quit: 10/27/1974     Years since quittin.2   Smokeless Tobacco Never Used       VITALS:  Vitals:    19 1407   BP: 124/80   Patient Site: Right Arm   Patient Position: Sitting   Cuff Size: Adult Regular   Pulse: 82   Weight: 179 lb 9.6 oz (81.5 kg)     Wt Readings from Last 3 Encounters:   19 179 lb 9.6 oz (81.5 kg)   18 177 lb 9.6 oz (80.6 kg)   18 177 lb (80.3 kg)     PHYSICAL EXAM:  Constitutional:  Reveals an alert, pleasant adult female.   Vitals:  Noted.     QUALITY MEASURES:  The following high BMI interventions were performed this visit: weight monitoring    MEDICATIONS:  Current Outpatient Medications   Medication Sig Dispense Refill     aspirin 325 MG tablet Take 325 mg by mouth at bedtime.        b complex vitamins tablet Take 1 tablet by mouth daily.       baclofen (LIORESAL) 10 MG tablet Take 10 mg by mouth daily.              cholecalciferol, vitamin D3, (VITAMIN D3) 2,000 unit cap Take 2,000 Units by mouth daily.        gabapentin (NEURONTIN) 300 MG capsule Take 300 mg by mouth 4 (four) times a day.       melatonin 3 mg Tab tablet Take 3 mg by mouth at bedtime as needed.       multivitamin with minerals (THERA-M) 9 mg iron-400 mcg Tab tablet Take 1 tablet by mouth daily.       omega 3-dha-epa-fish oil 1,200 (144-216) mg cap Take 1,200 mg by mouth daily.        rosuvastatin (CRESTOR) 20 MG tablet Take 1 tablet  (20 mg total) by mouth daily. 90 tablet 3     triamterene-hydrochlorothiazide (DYAZIDE) 37.5-25 mg per capsule Take 1 capsule by mouth daily. 90 capsule 2     zolpidem (AMBIEN) 5 MG tablet Take 1 tablet (5 mg total) by mouth at bedtime as needed for sleep. 90 tablet 0     albuterol (PROAIR HFA;PROVENTIL HFA;VENTOLIN HFA) 90 mcg/actuation inhaler Inhale 2 puffs every 6 (six) hours as needed for wheezing. 8.5 g 11     azithromycin (ZITHROMAX Z-JEANNE) 250 MG tablet 2 tabs (500 mg ) day #1, then 1 tab (250 mg) days #2-5, total 5 days 6 tablet 0     docusate sodium (COLACE) 100 MG capsule Take 1 capsule (100 mg total) by mouth 2 (two) times a day. 60 capsule 2     polyethylene glycol (MIRALAX) 17 gram packet Take 1 packet (17 g total) by mouth daily.  0     No current facility-administered medications for this visit.

## 2021-06-25 NOTE — TELEPHONE ENCOUNTER
Controlled Substance Refill Request  Medication Name:   Requested Prescriptions     Pending Prescriptions Disp Refills     zolpidem (AMBIEN) 5 MG tablet [Pharmacy Med Name: Zolpidem Tartrate 5 MG Oral Tablet] 30 tablet 0     Sig: TAKE 1 TABLET BY MOUTH ONCE DAILY AT BEDTIME AS NEEDED FOR SLEEP     Date Last Fill: 3/19/21  Requested Pharmacy: [facility]  Submit electronically to pharmacy  Controlled Substance Agreement on file:   Encounter-Level CSA Scan Date - 10/23/2018:    Scan on 10/26/2018 11:21 AM           Encounter-Level CSA Scan Date - 10/18/2017:    Scan on 10/20/2017  2:39 PM           Encounter-Level CSA Scan Date - 08/17/2016:    Scan on 8/17/2016: [facility]        Last office visit:  11/23/20

## 2021-06-25 NOTE — TELEPHONE ENCOUNTER
Medication: Zolpidem  Last Date Filled 03/19/2021  Last appointment addressing medication use: 11/23/2020      Taken as prescribed from physician notes? YES    CSA in last year: NO    Random Utox in last year: NO  (if any of the above answer NO - schedule with PCP)     Opioids + benzodiazepines? NO  (if the above answer YES - schedule with PCP every 6 months)       All responses suggest: Refilling prescription

## 2021-06-26 NOTE — PROGRESS NOTES
Progress Notes by Ranulfo No PA-C at 10/5/2018  5:00 PM     Author: Ranulfo No PA-C Service: -- Author Type: Physician Assistant    Filed: 10/5/2018  5:43 PM Encounter Date: 10/5/2018 Status: Signed    : Ranulfo No PA-C (Physician Assistant)       Subjective:      Patient ID: Maria Elena Sprague is a 68 y.o. female.    Chief Complaint:    HPI  Maria Elena Sprague is a 68 y.o. female who presents today complaining of concern for a rash.  Patient was seen yesterday at the [facility] urgent care and diagnosed with hives.  She was told to discontinue the Benadryl and use Claritin.  She initially got good improvement with Benadryl but then did not have good control of her rash with the Claritin.  She says that she has had a intermittent hive-like rash that has been blotchy and showing up in areas primarily on the waistline the creases of her thighs and the axilla and behind the knees.  Also has been on the arms and the anterior posterior torso.  She has had no angioedema symptoms to include no swelling of the lips tongue or mouth oropharyngeal airway is patent she has had no wheezing.  She did notice that she had swelling of her hands and had some area of swelling on the right palm.  This has then resolved.  She also states that her eyes have been slightly puffy and swollen.  She does not have any known food allergies no other contact allergens.  She has had some medication allergies in the past.  She also states that she has had a similar reaction to tomatoes in the past but does not admit to having an exposure to them.  At this time she has had no vomiting diarrhea or abdominal pain.  No noted urinary symptoms.      Past Medical History:   Diagnosis Date   ? Arthritis     neck and back   ? Hallux limitus of left foot    ? Hyperlipidemia    ? Hypertension    ? Insomnia    ? Leg pain     since stroke   ? Low back pain    ? MTHFR mutation (H)     per patient report, tested after stroke in 2013    ?  Obesity    ? Osteopenia    ? Shortness of breath     with exertion   ? Sleep apnea     cpap   ? Stroke (H)     balance issue       Past Surgical History:   Procedure Laterality Date   ? APPENDECTOMY     ? big toe rt side Right    ? COLONOSCOPY     ? MT REVISE MEDIAN N/CARPAL TUNNEL SURG Right 2017    Procedure: OPEN RIGHT CARPAL TUNNEL RELEASE WITH FLEXOR SYNOVECTOMY;  Surgeon: Kaushik Rodriguez MD;  Location: [facility];  Service: Hand       Family History   Problem Relation Age of Onset   ? Ovarian cancer Mother      late 70s   ? Stroke Mother    ? Colon cancer Father      late 60s   ? Prostate cancer Brother 56   ? Pancreatic cancer Maternal Aunt 82   ? Stomach cancer Maternal Uncle      80s   ? Cancer Paternal Uncle      melanoma and bladder in 80s   ? Skin cancer Maternal Grandmother    ? Colon cancer Maternal Grandfather      early 80s   ? Stomach cancer Paternal Grandfather      60s   ? Stomach cancer Other 81     paternal-maternal great-aunt   ? Kidney cancer Other      paternal side,  at 57   ? Colon cancer Other 52     paternal side   ? Stomach cancer Other      paternal side,  at 54   ? Leukemia Other      paternal side, late 50s   ? Breast cancer Other      maternal first-cousin, late 40s   ? Lymphoma Other 74     paternal side, MALT   ? Lymphoma Other      paternal side,  at 50   ? Stomach cancer Other 84     paternal side   ? Cancer Other      paternal side, breast and stomach cancer   ? Uterine cancer Other 78     paternal side   ? Cancer Other 83     paternal side, prostate and bone cancer   ? Leukemia Other      paternal side, 80s   ? Lung cancer Other      paternal side, 90s   ? Lung cancer Maternal Aunt      80s   ? Skin cancer Maternal Uncle    ? Cancer Maternal Uncle      stomach and colon cancer in 60s   ? Cancer Maternal Uncle      pituitary tumor 50s   ? Breast cancer Paternal Aunt      Age in 70's   ? Breast cancer Paternal Aunt      Age in 70's   ? Stroke  Sister        Social History   Substance Use Topics   ? Smoking status: Former Smoker     Packs/day: 1.00     Years: 2.00     Quit date: 10/27/1974   ? Smokeless tobacco: Never Used   ? Alcohol use 3.6 oz/week     6 Glasses of wine per week       Review of Systems  As above in HPI otherwise negative  Objective:     /70 (Patient Site: Right Arm, Patient Position: Sitting, Cuff Size: Adult Regular)  Pulse 75  Temp 98  F (36.7  C) (Oral)   Resp 18  Wt 176 lb (79.8 kg)  SpO2 100%  BMI 30.21 kg/m2    Physical Exam  General: Patient is resting comfortably no acute distress is afebrile  She is speaking in full sentences does not appear to be short of breath.  HEENT: Head is normocephalic atraumatic   eyes are PERRL EOMI sclera anicteric eyelids are slightly edematous but there is no periorbital edema  TMs are clear bilaterally  Throat is with no erythema or exudate oropharyngeal airway is patent.  There is no angioedema is no lip swelling.  No cervical lymphadenopathy present  LUNGS: Clear to auscultation bilaterally  HEART: Regular rate and rhythm  Skin: With urticarial type rash with wheals.  This does appear to be consistent with hives.    Assessment:     Procedures    1. Urticaria       Had a conversation with the patient regarding her symptoms.  We did go over indication for return to the walk-in care or to the ER.  I also cautioned her regarding the risks and benefits of using a antihistamine to include anticholinergic effects to include sedation fall risk and urinary retention.  She will use a bedside commode or remove all fall risks such as shoes close throw rugs and have a well at path from the bed to the toilet if necessary.  She voiced understanding of that concern.  She will follow-up with her primary care provider for reevaluation treatment if she is not getting good resolution of the above course of treatment or new concerns should arise.    Plan:         Patient Instructions     Continue using  the over-the-counter Benadryl 50 mg at nighttime.  Avoid extremes of temperature to include air and water as well as pressure which will make your rash and itching worse.  Avoid vigorous exercise which would cause sweating which again would make your rash and itching worse.  Monitor to ensure that you are lips tongue and airway do not swell and that you have easy swallowing and easy breathing. Or if you have problematic swelling.  If anything should change from that status present to the emergency room for immediate evaluation and treatment.  Use of over-the-counter H2 blocker (such as Zantac or Tagamet or Pepcid), follow packaging directions.      As a result of our visit today, here are the action plans for you:    1. Medication(s) to stop: There are no discontinued medications.    2. Medication(s) to start or change: No medications were ordered this encounter      3. Other instructions: Yes           Hives (Adult)  Hives are pink or red bumps on the skin. These bumps are also known as wheals. The bumps can itch, burn, or sting. Hives can occur anywhere on the body. They vary in size and shape and can form in clusters. Individual hives can appear and go away quickly. New hives may develop as old ones fade. Hives are common and usually harmless. Occasionally hives are a sign of a serious allergy.  Hives are often caused by an allergic reaction. It may be an allergic reaction to foods such as fruit, shellfish, chocolate, nuts, or tomatoes. It may be a reaction to pollens, animal fur, or mold spores. Medicines, chemicals, and insect bites can also cause hives. And hives can be caused by hot sun or cold air. The cause of hives can be difficult to find.  You may be given medicines to relieve swelling and itching. Follow all instructions when using these medicines. The hives will usually fade in a few days, but can last up to 2 weeks.  Home care  Follow these tips:    Try to find the cause of the hives and eliminate it.  Discuss possible causes with your healthcare provider. Future reactions to the same allergen may be worse.    Dont scratch the hives. Scratching will delay healing. To reduce itching, apply cool, wet compresses to the skin.    Dress in soft, loose cotton clothing.    Dont bathe in hot water. This can make the itching worse.    Apply an ice pack or cool pack wrapped in a thin towel to your skin. This will help reduce redness and itching. But if your hives were caused by exposure to cold, then do not apply more cold to them.    You may use over-the counter antihistamines to reduce itching. Some older antihistamines, such as diphenhydramine and chlorpheniramine, are inexpensive. But they need to be taken often and may make you sleepy. They are best used at bedtime. Dont use diphenhydramine if you have glaucoma or have trouble urinating because of an enlarged prostate. Newer antihistamines, such as loratadine, cetirizine, and fexofenadine, are generally more expensive. But they tend to have fewer side effects, such as drowsiness. They can be taken less often.    Another type of antihistamine is used to treat heartburn. This type includes ranitidine, nizatidine, famotidine, and cimetidine. These are sometimes used along with the above antihistamines if a single medicine is not working.  Follow-up care  Follow up with your healthcare provider if your symptoms don't get better in 2 days. Ask your provider about allergy testing if you have had a severe reaction, or have had several episodes of hives. He or she can use the allergy testing to find out what you are allergic to.  When to seek medical advice  Call your healthcare provider right away if any of these occur:    Fever of 100.4 F (38.0 C) or higher, or as directed by your healthcare provider    Redness, swelling, or pain    Foul-smelling fluid coming from the rash  Call 911  Call 911 if any of the following occur:    Swelling of the face, throat, or tongue    Trouble  breathing or swallowing    Dizziness, weakness, or fainting  Date Last Reviewed: 9/1/2016 2000-2017 [employer]. [address] [place] [address], [place] [address]. All rights reserved. This information is not intended as a substitute for professional medical care. Always follow your healthcare professional's instructions.        Understanding Hives (Urticaria)  Urticaria (hives) are red, itchy, and swollen areas on the skin. They are most often an allergic reaction from eating a food or taking a medicine. Sometimes the cause may be unknown. A single hive can vary in size from a half inch to several inches. Hives can appear all over the body. Or they may appear on only one part of the body.  What causes hives?  Hives can be caused by food and beverages such as:    Tree nuts (almonds, walnuts, hazelnuts)    Peanuts    Eggs    Shellfish    Milk  Hives can also be caused by medicines such as:    Antibiotics, especially penicillin and sulfa-based medicines     Anticonvulsant or antiseizure medicines     Chemotherapy medicines   Other causes of hives include:    Infection or virus    Heat    Cold air or cold water    Exercise    Scratching or rubbing your skin, or wearing tight-fitting clothes that rub your skin    Being exposed to sunlight or light from a light bulb, in rare cases    Inhaled-chemicals in the environment from foods and drugs, insects, plants, or other sources  In some cases, hives may occur again and again with no specific cause.  If you have hives    Stay away from the food, drink, medicine, or other thing that may be causing the hives.    Ask your healthcare provider how to control itchy or irritated skin.    Talk with your healthcare provider right away if you think a medicine gave you hives.  Watch for anaphylaxis  If you have hives, watch for symptoms of a severe reaction that can affect your entire body. This is called anaphylaxis. Symptoms can include swollen areas of the body, wheezing,  trouble breathing or swallowing, and a hoarse voice. This reaction may happen right away. Or it may happen in an hour or more. In extreme cases, the airways from mouth to lungs may swell and make breathing difficult. This is a medical emergency. Use epinephrine medicine if you have it, and call 911 or go to the emergency room.     When to call your healthcare provider  Call your healthcare provider if:    Your hives feel uncomfortable    You have never had hives before    Your symptoms don't go away or come back    Your symptoms get worse or new symptoms develop such as:   ? Sneezing, coughing, runny or stuffy nose  ? Itching of the eyes, nose, or roof of the mouth  ? Itching, burning, stinging, or pain  ? Dry, flaky, cracking, or scaly skin  ? Red or purple spots  Call 911  Call 911 right away if you have:    Swelling in your lips, tongue, or throat, called angioedema    Drooling    Trouble breathing, talking, or swallowing    Cool, moist or pale (blue in color) skin    Fast and weak heartbeat    Wheezing or short of breath    Feeling lightheaded or confused    Diarrhea    Severe nausea or vomiting    Seizure    Feeling dizzy or weak, or a sudden drop in blood pressure   Date Last Reviewed: 4/1/2017 2000-2017 [employer]. [address] [place] [address], [place] [address]. All rights reserved. This information is not intended as a substitute for professional medical care. Always follow your healthcare professional's instructions.

## 2021-06-28 NOTE — PROGRESS NOTES
Progress Notes by Amy Mendoza MD at 12/19/2019  4:00 PM     Author: Amy Mendoza MD Service: -- Author Type: Physician    Filed: 12/19/2019  8:44 PM Encounter Date: 12/19/2019 Status: Signed    : Amy Mendoza MD (Physician)       Provider wore a mask during this visit.   Subjective:   Maria Elena Sprague is a 69 y.o. female  Roomed by: Nick VILLARREAL CMA    Refills needed? No    Do you have any forms that need to be filled out? No      Chief Complaint   Patient presents with   ? Shortness of Breath     x3-4d, chest pain, cough, would like chest xray   Was treated on 12/6 with a Z elmer for 3 weeks of cough. Then treated on 12/13 for acute bronchitis with tapering dose of prednisone. Since 12/13 her wheezing was better, but has had chest soreness and shortness of breath. Says has been coughing less. Says chest feels full. Denies any fevers, but admits chills today. Appetite has been less than normal. Admits being able to drink fluids and urinate normal amounts. Denies any recent nausea, vomiting, belly pain, or diarrhea. Denies headaches or dizziness. Says the prednisone has affected her sleep. Denies any recent long road or airplane trips. Energy level has been down for the last week.     Review of Systems  See HPI for ROS, otherwise balance of other systems negative    Allergies   Allergen Reactions   ? Oxaprozin Swelling     Hands and feet, itching (daypro), trouble breathing   ? Chlorhexidine Towelette Itching     Patient started itching all over after straight cath performed and used Chlorhexidine swabs.     ? Influenza Virus Vaccine Whole Dizziness   ? Hydrocodone Itching     Per pt she had reaction when she took this meds the second time.   ? Itraconazole Hives and Itching       Current Outpatient Medications:   ?  albuterol (PROAIR HFA;PROVENTIL HFA;VENTOLIN HFA) 90 mcg/actuation inhaler, Inhale 2 puffs every 6 (six) hours as needed for wheezing., Disp: 8.5 g, Rfl: 11  ?  aspirin 325 MG tablet, Take  325 mg by mouth at bedtime. , Disp: , Rfl:   ?  b complex vitamins tablet, Take 1 tablet by mouth daily., Disp: , Rfl:   ?  baclofen (LIORESAL) 10 MG tablet, Take 10 mg by mouth every evening.    , Disp: , Rfl:   ?  cholecalciferol, vitamin D3, (VITAMIN D3) 2,000 unit cap, Take 2,000 Units by mouth daily. , Disp: , Rfl:   ?  docosahexanoic acid/epa (FISH OIL ORAL), Take by mouth., Disp: , Rfl:   ?  fluticasone propionate (FLONASE) 50 mcg/actuation nasal spray, Apply 1 spray into each nostril daily., Disp: , Rfl:   ?  gabapentin (NEURONTIN) 300 MG capsule, Take 300 mg by mouth 4 (four) times a day., Disp: , Rfl:   ?  magnesium 250 mg Tab, Take 5 tablets by mouth daily., Disp: , Rfl:   ?  melatonin 3 mg Tab tablet, Take 3 mg by mouth at bedtime as needed., Disp: , Rfl:   ?  multivitamin with minerals (THERA-M) 9 mg iron-400 mcg Tab tablet, Take 1 tablet by mouth daily., Disp: , Rfl:   ?  predniSONE (DELTASONE) 20 MG tablet, Take 40 mg by mouth daily for 4 days, THEN 20 mg daily for 4 days, THEN 10 mg daily for 4 days., Disp: 14 tablet, Rfl: 0  ?  rosuvastatin (CRESTOR) 20 MG tablet, Take 1 tablet (20 mg total) by mouth daily., Disp: 90 tablet, Rfl: 3  ?  triamterene-hydrochlorothiazide (DYAZIDE) 37.5-25 mg per capsule, Take 1 capsule by mouth daily., Disp: 90 capsule, Rfl: 3  ?  zolpidem (AMBIEN) 5 MG tablet, TAKE 1 TABLET BY MOUTH ONCE DAILY AT BEDTIME AS NEEDED FOR SLEEP, Disp: 90 tablet, Rfl: 0  ?  MAGNESIUM CITRATE ORAL, Take 400 mg by mouth at bedtime.    , Disp: , Rfl:   ?  omega 3-dha-epa-fish oil 1,200 (144-216) mg cap, Take 1,200 mg by mouth daily. , Disp: , Rfl:   ?  vitamin B complex (B COMPLEX ORAL), Take by mouth., Disp: , Rfl:   Patient Active Problem List   Diagnosis   ? Obstructive sleep apnea   ? Hyperlipidemia   ? Hypertension   ? History of stroke   ? Insomnia   ? Family history of malignant neoplasm of ovary     Past Medical History:   Diagnosis Date   ? Hyperlipidemia    ? Obesity    ? Osteopenia   "  ? Stroke (H) 2013    balance issue       Objective:     Vitals:    12/19/19 1606 12/19/19 1636   BP: 150/83 127/79   Patient Site: Left Arm Left Arm   Patient Position: Sitting Sitting   Cuff Size: Adult Regular Adult Regular   Pulse: 70 69   Resp: 16    Temp: 97.8  F (36.6  C)    TempSrc: Oral    SpO2: 96%    Weight: 187 lb 6 oz (85 kg)    Height: 5' 4\" (1.626 m)    Gen - Pt in NAD  Eyes - Conjunctiva non injected, no drainage  Face - non TTP over frontal sinus areas; non TTP over maxillary areas  Ears - external canals - no induration, Right TM - not injected, Left TM - not injected   Nose - not congested, no nasal drainage  Pharynx - not injected, tonsils 1+ size  Neck - supple, no cervical adenopathy, no masses  Cor - RRR w/o murmur  Lungs - Good air entry; no wheezes or crackles noted on auscultation - no coughing noted  Skin - no lesions, no rashes noted    Xr Chest 2 Views    Result Date: 12/19/2019  EXAM DATE:         12/19/2019 EXAM: X-RAY CHEST, 2 VIEWS, FRONTAL AND LATERAL LOCATION: [facility] DATE/TIME: 12/19/2019 5:00 PM INDICATION: 6 weeks of coughing with 2-3 days of SOB. ? pneumonia COMPARISON: 06/06/2016. IMPRESSION: Heart size within normal limits. Mildly tortuous thoracic aorta with calcified arch. There is linear atelectasis or scarring at the left lung base. No airspace consolidation to suggest pneumonia. No visible pneumothorax or pleural effusion. Arthritic changes of the acromioclavicular joints and mid thoracic spine.   Radiologist's report discussed with Patient on day of visit.     Assessment - Plan   Medical Decision Making -69-year-old woman presents with continued shortness of breath cough and feeling of shortness of breath.  Patient symptoms started about 6 weeks ago.  On 12/6 she was treated with a Z-Gavin for a cough for 3 weeks.  Then on 12/13 she was treated with prednisone tapering dose.  She requested a chest x-ray today.  Patient was afebrile and vital signs " are stable.  Her lung exam was within normal limits.  Chest x-ray showed no pneumonia, pneumothorax or pleural effusion.  Discussed with patient that she makes a criteria for an acute week non-recurrence of sinusitis and that another antibiotic might be more appropriate.  Discussed with patient the possibility of Achilles tendinitis with the rare possibility of Achilles tendon rupture from fluoroquinolones.  Patient agreed with the option of levofloxacin, which was preferred because of her respiratory symptoms for a week.  She is to follow-up with primary care within 7 to 10 days.    1. Acute non-recurrent sinusitis, unspecified location  - levoFLOXacin (LEVAQUIN) 500 MG tablet; Take 1 tablet (500 mg total) by mouth daily for 10 days.  Dispense: 10 tablet; Refill: 0    2. Acute bronchitis, unspecified organism    3. SOB (shortness of breath)  - XR Chest 2 Views; Future  - XR Chest 2 Views    At the conclusion of the encounter, assessment and plan were discussed.   All questions were answered.   The patient or guardian acknowledged understanding and was involved in the decision making regarding the overall care plan.    Patient Instructions   1. Keep well hydrated  2. May alternate Tylenol every 6 hours with ibuprofen every 6 hours as needed for fever or pain  3. Make a follow up appointment preferably in a week, but before 12/31.   4. If your symptoms are getting worse after 48 hours of antibiotics or you have any questions, call the clinic number  - it's answered 24/7    Patient Education     Sinusitis (Antibiotic Treatment)    The sinuses are air-filled spaces within the bones of the face. They connect to the inside of the nose. Sinusitis is an inflammation of the tissue that lines the sinuses. Sinusitis can occur during a cold. It can also happen due to allergies to pollens and other particles in the air. Sinusitis can cause symptoms of sinus congestion and a feeling of fullness. A sinus infection causes fever,  headache, and facial pain. There is often green or yellow fluid draining from the nose or into the back of the throat (post-nasal drip). You have been given antibiotics to treat this condition.  Home care    Take the full course of antibiotics as instructed. Do not stop taking them, even when you feel better.    Drink plenty of water, hot tea, and other liquids. This may help thin nasal mucus. It also may help your sinuses drain fluids.    Heat may help soothe painful areas of your face. Use a towel soaked in hot water. Or,  the shower and direct the warm spray onto your face. Using a vaporizer along with a menthol rub at night may also help soothe symptoms.     An expectorant with guaifenesin may help thin nasal mucus and help your sinuses drain fluids.    You can use an over-the-counter decongestant, unless a similar medicine was prescribed to you. Nasal sprays work the fastest. Use one that contains phenylephrine or oxymetazoline. First blow your nose gently. Then use the spray. Do not use these medicines more often than directed on the label. If you do, your symptoms may get worse. You may also take pills that contain pseudoephedrine. Dont use products that combine multiple medicines. This is because side effects may be increased. Read labels. You can also ask the pharmacist for help. (People with high blood pressure should not use decongestants. They can raise blood pressure.)    Over-the-counter antihistamines may help if allergies contributed to your sinusitis.      Do not use nasal rinses or irrigation during an acute sinus infection, unless your healthcare provider tells you to. Rinsing may spread the infection to other areas in your sinuses.    Use acetaminophen or ibuprofen to control pain, unless another pain medicine was prescribed to you. If you have chronic liver or kidney disease or ever had a stomach ulcer, talk with your healthcare provider before using these medicines. (Aspirin should  never be taken by anyone under age 18 who is ill with a fever. It may cause severe liver damage.)    Don't smoke. This can make symptoms worse.  Follow-up care  Follow up with your healthcare provider or our staff if you are better in 1 week.  When to seek medical advice  Call your healthcare provider if any of these occur:    Facial pain or headache that gets worse    Stiff neck    Unusual drowsiness or confusion    Swelling of your forehead or eyelids    Vision problems, such as blurred or double vision    Fever of 100.4 F (38 C) or higher, or as directed by your healthcare provider    Seizure    Breathing problems    Symptoms don't go away in 10 days  Prevention  Here are steps you can take to help prevent an infection:    Keep good hand washing habits.    Dont have close contact with people who have sore throats, colds, or other upper respiratory infections.    Dont smoke, and stay away from secondhand smoke.    Stay up to date with of your vaccines.  Date Last Reviewed: 11/1/2017 2000-2017 [employer] [address] [place] [address]. All rights reserved. This information is not intended as a substitute for professional medical care. Always follow your healthcare professional's instructions.           Patient Education     Acute Bronchitis (Adult)    Bronchitis is an infection of the air passages (bronchial tubes) in your lungs. It often occurs when you have a cold. This illness is contagious during the first few days and is spread through the air by coughing and sneezing, or by direct contact (touching the sick person and then touching your own eyes, nose, or mouth).  Symptoms of bronchitis include cough with mucus (phlegm) and low-grade fever. Bronchitis usually lasts 7 to 14 days. Mild cases can be treated with simple home remedies. More severe infection is treated with an antibiotic.  Home care  Follow these guidelines when caring for yourself at home:    If your symptoms are  severe, rest at home for the first 2 to 3 days. When you go back to your usual activities, don't let yourself get too tired.    Do not smoke. Also avoid being exposed to secondhand smoke.    You may use over-the-counter medicines to control fever or pain, unless another medicine was prescribed. If you have chronic liver or kidney disease or have ever had a stomach ulcer or gastrointestinal bleeding, talk with your healthcare provider before using these medicines. Also talk to your provider if you are taking medicine to prevent blood clots. Aspirin should never be given to anyone younger than 18 years of age who is ill with a viral infection or fever. It may cause severe liver or brain damage.    Your appetite may be poor, so a light diet is fine. Avoid dehydration by drinking 6 to 8 glasses of fluids per day (such as water, soft drinks, sports drinks, juices, tea, or soup). Extra fluids will help loosen secretions in the nose and lungs.    Over-the-counter cough, cold, and sore-throat medicines will not shorten the length of the illness, but they may be helpful to reduce symptoms. (Note: Do not use decongestants if you have high blood pressure.)    Finish all antibiotic medicine. Do this even if you are feeling better after only a few days.  Follow-up care  Follow up with your healthcare provider, or as advised. If you had an X-ray or ECG (electrocardiogram), a specialist will review it. You will be notified of any new findings that may affect your care.  If you are age 65 or older, or if you have a chronic lung disease or condition that affects your immune system, or you smoke, ask your healthcare provider about getting a pneumococcal vaccine and a yearly flu shot (influenza vaccine).  When to seek medical advice  Call your healthcare provider right away if any of these occur:    Fever of 100.4 F (38 C) or higher, or as directed by your healthcare provider    Coughing up increased amounts of colored  sputum    Weakness, drowsiness, headache, facial pain, ear pain, or a stiff neck  Call 911  Call 911 if any of these occur.    Coughing up blood    Worsening weakness, drowsiness, headache, or stiff neck    Trouble breathing, wheezing, or pain with breathing  Date Last Reviewed: 9/13/2015 2000-2017 [employer]. [address] [place], [place] [id number]. All rights reserved. This information is not intended as a substitute for professional medical care. Always follow your healthcare professional's instructions.

## 2021-06-28 NOTE — PROGRESS NOTES
Progress Notes by Mitchell Ayoub DO at 12/13/2019 10:40 AM     Author: Mitchell Ayoub DO Service: -- Author Type: Physician    Filed: 12/13/2019  1:33 PM Encounter Date: 12/13/2019 Status: Signed    : Mitchell Ayoub DO (Physician)       Chief Complaint   Patient presents with   ? Cough     wheezing, x couple of weeks, was seen 12/6 (no improvement), no fever        History of Present Illness: Rooming staff notes reviewed. Her sinus pain got better with recent treatment with azithromycin, but her cough and wheezing persists. She has not had a fever for at least a week. She still has some nasal congestion. She uses a CPAP at night. She is already using fluticasone nasal spray.     Review of systems: See history of present illness, all others negative.     Current Outpatient Medications   Medication Sig Dispense Refill   ? albuterol (PROAIR HFA;PROVENTIL HFA;VENTOLIN HFA) 90 mcg/actuation inhaler Inhale 2 puffs every 6 (six) hours as needed for wheezing. 8.5 g 11   ? aspirin 325 MG tablet Take 325 mg by mouth at bedtime.      ? b complex vitamins tablet Take 1 tablet by mouth daily.     ? baclofen (LIORESAL) 10 MG tablet Take 10 mg by mouth every evening.            ? cholecalciferol, vitamin D3, (VITAMIN D3) 2,000 unit cap Take 2,000 Units by mouth daily.      ? docosahexanoic acid/epa (FISH OIL ORAL) Take by mouth.     ? gabapentin (NEURONTIN) 300 MG capsule Take 300 mg by mouth 4 (four) times a day.     ? magnesium 250 mg Tab Take 5 tablets by mouth daily.     ? MAGNESIUM CITRATE ORAL Take 400 mg by mouth at bedtime.            ? melatonin 3 mg Tab tablet Take 3 mg by mouth at bedtime as needed.     ? multivitamin with minerals (THERA-M) 9 mg iron-400 mcg Tab tablet Take 1 tablet by mouth daily.     ? omega 3-dha-epa-fish oil 1,200 (144-216) mg cap Take 1,200 mg by mouth daily.      ? rosuvastatin (CRESTOR) 20 MG tablet Take 1 tablet (20 mg total) by mouth daily. 90 tablet 3   ?  triamterene-hydrochlorothiazide (DYAZIDE) 37.5-25 mg per capsule Take 1 capsule by mouth daily. 90 capsule 3   ? vitamin B complex (B COMPLEX ORAL) Take by mouth.     ? zolpidem (AMBIEN) 5 MG tablet TAKE 1 TABLET BY MOUTH ONCE DAILY AT BEDTIME AS NEEDED FOR SLEEP 90 tablet 0   ? predniSONE (DELTASONE) 20 MG tablet Take 40 mg by mouth daily for 4 days, THEN 20 mg daily for 4 days, THEN 10 mg daily for 4 days. 14 tablet 0     No current facility-administered medications for this visit.      Past Medical History:   Diagnosis Date   ? Hyperlipidemia    ? Obesity    ? Osteopenia    ? Stroke (H) 2013    balance issue      Past Surgical History:   Procedure Laterality Date   ? APPENDECTOMY     ? big toe rt side Right    ? INGUINAL HERNIA REPAIR Left 2019    Procedure: REPAIR, HERNIA, INGUINAL, OPEN;  Surgeon: Bonnie Khalil DO;  Location: [facility];  Service: General   ? MN REVISE MEDIAN N/CARPAL TUNNEL SURG Right 2017    Procedure: OPEN RIGHT CARPAL TUNNEL RELEASE WITH FLEXOR SYNOVECTOMY;  Surgeon: Kaushik Rodriguez MD;  Location: [facility];  Service: Hand      Social History     Tobacco Use   ? Smoking status: Former Smoker     Packs/day: 1.00     Years: 2.00     Pack years: 2.00     Last attempt to quit: 10/27/1974     Years since quittin.1   ? Smokeless tobacco: Never Used   Substance Use Topics   ? Alcohol use: Yes     Alcohol/week: 6.0 standard drinks     Types: 6 Glasses of wine per week   ? Drug use: No        Family History   Problem Relation Age of Onset   ? Ovarian cancer Mother         late 70s   ? Stroke Mother    ? Colon cancer Father         late 60s   ? Prostate cancer Brother 56   ? Pancreatic cancer Maternal Aunt 82   ? Stomach cancer Maternal Uncle         80s   ? Cancer Paternal Uncle         melanoma and bladder in 80s   ? Skin cancer Maternal Grandmother    ? Colon cancer Maternal Grandfather         early 80s   ? Stomach cancer Paternal Grandfather         60s    ? Stomach cancer Other 81        paternal-maternal great-aunt   ? Kidney cancer Other         paternal side,  at 57   ? Colon cancer Other 52        paternal side   ? Stomach cancer Other         paternal side,  at 54   ? Leukemia Other         paternal side, late 50s   ? Breast cancer Other         maternal first-cousin, late 40s   ? Lymphoma Other 74        paternal side, MALT   ? Lymphoma Other         paternal side,  at 50   ? Stomach cancer Other 84        paternal side   ? Cancer Other         paternal side, breast and stomach cancer   ? Uterine cancer Other 78        paternal side   ? Cancer Other 83        paternal side, prostate and bone cancer   ? Leukemia Other         paternal side, 80s   ? Lung cancer Other         paternal side, 90s   ? Lung cancer Maternal Aunt         80s   ? Skin cancer Maternal Uncle    ? Cancer Maternal Uncle         stomach and colon cancer in 60s   ? Cancer Maternal Uncle         pituitary tumor 50s   ? Breast cancer Paternal Aunt         Age in 70's   ? Breast cancer Paternal Aunt         Age in 70's   ? Stroke Sister        Vitals:    19 1044 19 1046   BP: (!) 152/92 143/86   Pulse: 69    Resp: 16    Temp: 97.5  F (36.4  C)    TempSrc: Oral    SpO2: 97%    Weight: 184 lb 11.2 oz (83.8 kg)        EXAM:   General: Vital signs reviewed. Patient is in no acute appearing distress with a rare cough. Breathing is non labored appearing. Patient is alert and oriented x 3.   ENT: Ear exam shows no tympanic membrane dullness and injection, bilateral nasal turbinates are injected and edematous with no rhinorrhea, and no sinus tenderness, no pharyngeal injection with no increased post nasal drainage noted.  Neck: supple with no adenopathy.  Heart: Rate is normal with regular rhythm. No murmur.  Lungs: Bilateral expiratory wheezing noted in lower lobes, with fair air flow. No crackles or rhonchi heard.  Skin: warm and dry    Assessment/Plan   1. Acute bronchitis  with symptoms > 10 days  predniSONE (DELTASONE) 20 MG tablet       Patient Instructions   See hand out for advice. Continue the daily nasal steroid spray, and use albuterol as needed. Try using a home humidifier.    Patient Education     Viral or Bacterial Bronchitis with Wheezing (Adult)    Bronchitis is an infection of the air passages. It often occurs during a cold and is usually caused by a virus. Symptoms include cough with mucus (phlegm) and low-grade fever. This illness is contagious during the first few days and is spread through the air by coughing and sneezing, or by direct contact (touching the sick person and then touching your own eyes, nose, or mouth).  If there is a lot of inflammation, air flow is restricted. The air passages may also go into spasm, especially if you have asthma. This causes wheezing and difficulty breathing even in people who do not have asthma.  Bronchitis usually lasts 7 to 14 days. The wheezing should improve with treatment during the first week. An inhaler is often prescribed to relax the air passages and stop wheezing. Antibiotics will be prescribed if your doctor thinks there is also a secondary bacterial infection.  Home care    If symptoms are severe, rest at home for the first 2 to 3 days. When you go back to your usual activities, don't let yourself get too tired.    Do not smoke. Also avoid being exposed to secondhand smoke.    You may use over-the-counter medicine to control fever or pain, unless another medicine was prescribed. Note: If you have chronic liver or kidney disease or have ever had a stomach ulcer or gastrointestinal bleeding, talk with your healthcare provider before using these medicines. Also talk to your provider if you are taking medicine to prevent blood clots.) Aspirin should never be given to anyone younger than 18 years of age who is ill with a viral infection or fever. It may cause severe liver or brain damage.    Your appetite may be poor, so a  light diet is fine. Avoid dehydration by drinking 6 to 8 glasses of fluids per day (such as water, soft drinks, sports drinks, juices, tea, or soup). Extra fluids will help loosen secretions in the nose and lungs.    Over-the-counter cough, cold, and sore-throat medicines will not shorten the length of the illness, but they may be helpful to reduce symptoms. (Note: Do not use decongestants if you have high blood pressure.)    If you were given an inhaler, use it exactly as directed. If you need to use it more often than prescribed, your condition may be worsening. If this happens, contact your healthcare provider.    If prescribed, finish all antibiotic medicine, even if you are feeling better after only a few days.  Follow-up care  Follow up with your healthcare provider, or as advised. If you had an X-ray or ECG (electrocardiogram), a specialist will review it. You will be notified of any new findings that may affect your care.  If you are age 65 or older, or if you have a chronic lung disease or condition that affects your immune system, or you smoke, ask your healthcare provider about getting a pneumococcal vaccine and a yearly flu shot (influenza vaccine).  When to seek medical advice  Call your healthcare provider right away if any of these occur:    Fever of 100.4 F (38 C) or higher, or as directed by your healthcare provider    Coughing up increasing amounts of colored sputum    Weakness, drowsiness, headache, facial pain, ear pain, or a stiff neck  Call 911  Call 911 if any of these occur.    Coughing up blood    Worsening weakness, drowsiness, headache, or stiff neck    Increased wheezing not helped with medication, shortness of breath, or pain with breathing  Date Last Reviewed: 9/13/2015 2000-2017 [employer]. [address] [place] [address], [place] [address]. All rights reserved. This information is not intended as a substitute for professional medical care. Always follow your healthcare  professional's instructions.              Mitchell Ayoub DO

## 2021-07-21 ENCOUNTER — RECORDS - HEALTHEAST (OUTPATIENT)
Dept: ADMINISTRATIVE | Facility: CLINIC | Age: 71
End: 2021-07-21

## 2021-09-23 ENCOUNTER — MYC MEDICAL ADVICE (OUTPATIENT)
Dept: INTERNAL MEDICINE | Facility: CLINIC | Age: 71
End: 2021-09-23

## 2021-10-07 DIAGNOSIS — F51.01 PRIMARY INSOMNIA: Primary | ICD-10-CM

## 2021-10-08 RX ORDER — ZOLPIDEM TARTRATE 5 MG/1
TABLET ORAL
Qty: 30 TABLET | Refills: 0 | Status: SHIPPED | OUTPATIENT
Start: 2021-10-08 | End: 2021-11-10

## 2021-10-08 NOTE — TELEPHONE ENCOUNTER
Disp Refills Start End GELA   zolpidem (AMBIEN) 5 MG tablet 30 tablet 0 6/8/2021  No   Sig: TAKE 1 TABLET BY MOUTH ONCE DAILY AT BEDTIME AS NEEDED FOR SLEEP   Sent to pharmacy as: zolpidem 5 mg tablet (AMBIEN)   E-Prescribing Status: Receipt confirmed by pharmacy (6/8/2021 12:16 PM CDT)       zolpidem (AMBIEN) 5 MG tablet [[id number]]    Electronically signed by: Chip Dalton MD on 06/08/21 1216 Status: Active   Ordering user: Chip Dalton MD 06/08/21 1216 Authorized by: Chip Dalton MD   Frequency:  06/08/21 - Until Discontinued Released by: Chip Dalton MD 06/08/21 1216   Diagnoses  Insomnia [G47.00]     Routing refill request to provider for review/approval because:  Controlled Substance Refill.    Last Written Prescription Date:  06/08/2021  Last Fill Quantity: 30,  # refills: 0   Last office visit provider:  11/23/2020 with Dr Correa.    Requested Prescriptions   Pending Prescriptions Disp Refills     zolpidem (AMBIEN) 5 MG tablet [Pharmacy Med Name: Zolpidem Tartrate 5 MG Oral Tablet] 30 tablet 0     Sig: TAKE 1 TABLET BY MOUTH ONCE DAILY AT BEDTIME AS NEEDED FOR SLEEP       There is no refill protocol information for this order          Liyah Anderson 10/07/21 11:56 PM

## 2021-10-11 ENCOUNTER — HEALTH MAINTENANCE LETTER (OUTPATIENT)
Age: 71
End: 2021-10-11

## 2021-11-08 SDOH — ECONOMIC STABILITY: TRANSPORTATION INSECURITY
IN THE PAST 12 MONTHS, HAS THE LACK OF TRANSPORTATION KEPT YOU FROM MEDICAL APPOINTMENTS OR FROM GETTING MEDICATIONS?: NO

## 2021-11-08 SDOH — HEALTH STABILITY: PHYSICAL HEALTH: ON AVERAGE, HOW MANY DAYS PER WEEK DO YOU ENGAGE IN MODERATE TO STRENUOUS EXERCISE (LIKE A BRISK WALK)?: 3 DAYS

## 2021-11-08 SDOH — ECONOMIC STABILITY: FOOD INSECURITY: WITHIN THE PAST 12 MONTHS, THE FOOD YOU BOUGHT JUST DIDN'T LAST AND YOU DIDN'T HAVE MONEY TO GET MORE.: NEVER TRUE

## 2021-11-08 SDOH — ECONOMIC STABILITY: INCOME INSECURITY: HOW HARD IS IT FOR YOU TO PAY FOR THE VERY BASICS LIKE FOOD, HOUSING, MEDICAL CARE, AND HEATING?: NOT HARD AT ALL

## 2021-11-08 SDOH — HEALTH STABILITY: PHYSICAL HEALTH: ON AVERAGE, HOW MANY MINUTES DO YOU ENGAGE IN EXERCISE AT THIS LEVEL?: 20 MIN

## 2021-11-08 SDOH — ECONOMIC STABILITY: FOOD INSECURITY: WITHIN THE PAST 12 MONTHS, YOU WORRIED THAT YOUR FOOD WOULD RUN OUT BEFORE YOU GOT MONEY TO BUY MORE.: NEVER TRUE

## 2021-11-08 SDOH — ECONOMIC STABILITY: TRANSPORTATION INSECURITY
IN THE PAST 12 MONTHS, HAS LACK OF TRANSPORTATION KEPT YOU FROM MEETINGS, WORK, OR FROM GETTING THINGS NEEDED FOR DAILY LIVING?: NO

## 2021-11-08 SDOH — ECONOMIC STABILITY: INCOME INSECURITY: IN THE LAST 12 MONTHS, WAS THERE A TIME WHEN YOU WERE NOT ABLE TO PAY THE MORTGAGE OR RENT ON TIME?: NO

## 2021-11-08 ASSESSMENT — LIFESTYLE VARIABLES
HOW OFTEN DO YOU HAVE SIX OR MORE DRINKS ON ONE OCCASION: NEVER
HOW OFTEN DO YOU HAVE A DRINK CONTAINING ALCOHOL: 2-3 TIMES A WEEK
HOW MANY STANDARD DRINKS CONTAINING ALCOHOL DO YOU HAVE ON A TYPICAL DAY: 1 OR 2

## 2021-11-08 ASSESSMENT — SOCIAL DETERMINANTS OF HEALTH (SDOH)
HOW OFTEN DO YOU ATTEND CHURCH OR RELIGIOUS SERVICES?: MORE THAN 4 TIMES PER YEAR
IN A TYPICAL WEEK, HOW MANY TIMES DO YOU TALK ON THE PHONE WITH FAMILY, FRIENDS, OR NEIGHBORS?: MORE THAN THREE TIMES A WEEK
HOW OFTEN DO YOU GET TOGETHER WITH FRIENDS OR RELATIVES?: THREE TIMES A WEEK
DO YOU BELONG TO ANY CLUBS OR ORGANIZATIONS SUCH AS CHURCH GROUPS UNIONS, FRATERNAL OR ATHLETIC GROUPS, OR SCHOOL GROUPS?: YES

## 2021-11-08 ASSESSMENT — ACTIVITIES OF DAILY LIVING (ADL): CURRENT_FUNCTION: NO ASSISTANCE NEEDED

## 2021-11-10 ENCOUNTER — OFFICE VISIT (OUTPATIENT)
Dept: INTERNAL MEDICINE | Facility: CLINIC | Age: 71
End: 2021-11-10
Payer: COMMERCIAL

## 2021-11-10 VITALS
SYSTOLIC BLOOD PRESSURE: 128 MMHG | HEIGHT: 64 IN | BODY MASS INDEX: 32.95 KG/M2 | DIASTOLIC BLOOD PRESSURE: 70 MMHG | WEIGHT: 193 LBS

## 2021-11-10 DIAGNOSIS — Z79.899 ENCOUNTER FOR LONG-TERM (CURRENT) USE OF MEDICATIONS: ICD-10-CM

## 2021-11-10 DIAGNOSIS — Z12.31 ENCOUNTER FOR SCREENING MAMMOGRAM FOR MALIGNANT NEOPLASM OF BREAST: ICD-10-CM

## 2021-11-10 DIAGNOSIS — R06.2 WHEEZING: ICD-10-CM

## 2021-11-10 DIAGNOSIS — E78.2 MIXED HYPERLIPIDEMIA: ICD-10-CM

## 2021-11-10 DIAGNOSIS — Z15.89 MTHFR MUTATION: ICD-10-CM

## 2021-11-10 DIAGNOSIS — Z00.00 ENCOUNTER FOR MEDICARE ANNUAL WELLNESS EXAM: Primary | ICD-10-CM

## 2021-11-10 DIAGNOSIS — Z80.41 FAMILY HISTORY OF MALIGNANT NEOPLASM OF OVARY: ICD-10-CM

## 2021-11-10 DIAGNOSIS — F51.01 PRIMARY INSOMNIA: ICD-10-CM

## 2021-11-10 DIAGNOSIS — M54.16 LUMBAR RADICULOPATHY: Chronic | ICD-10-CM

## 2021-11-10 DIAGNOSIS — I10 BENIGN ESSENTIAL HYPERTENSION: ICD-10-CM

## 2021-11-10 DIAGNOSIS — I63.311 CEREBRAL INFARCTION DUE TO THROMBOSIS OF RIGHT MIDDLE CEREBRAL ARTERY (H): ICD-10-CM

## 2021-11-10 PROBLEM — E78.5 HYPERLIPIDEMIA: Status: ACTIVE | Noted: 2021-11-10

## 2021-11-10 PROBLEM — M54.2 NECK PAIN: Status: ACTIVE | Noted: 2021-11-10

## 2021-11-10 PROBLEM — H10.509 BLEPHAROCONJUNCTIVITIS: Status: ACTIVE | Noted: 2021-11-10

## 2021-11-10 PROBLEM — E72.10 DISORDER OF SULFUR-BEARING AMINO ACID METABOLISM (H): Status: ACTIVE | Noted: 2020-12-21

## 2021-11-10 PROBLEM — G56.00 CARPAL TUNNEL SYNDROME: Status: ACTIVE | Noted: 2021-11-10

## 2021-11-10 PROBLEM — G47.33 OBSTRUCTIVE SLEEP APNEA SYNDROME: Status: ACTIVE | Noted: 2021-11-10

## 2021-11-10 PROBLEM — G47.00 INSOMNIA: Status: ACTIVE | Noted: 2021-11-10

## 2021-11-10 PROBLEM — K43.2 INCISIONAL HERNIA, WITHOUT OBSTRUCTION OR GANGRENE: Status: ACTIVE | Noted: 2020-01-29

## 2021-11-10 PROBLEM — M79.10 MUSCLE PAIN: Status: ACTIVE | Noted: 2021-11-10

## 2021-11-10 PROBLEM — E66.9 OBESITY: Status: ACTIVE | Noted: 2021-11-10

## 2021-11-10 PROBLEM — Z86.73 HISTORY OF STROKE: Status: ACTIVE | Noted: 2021-11-10

## 2021-11-10 LAB
ALBUMIN SERPL-MCNC: 3.9 G/DL (ref 3.5–5)
ALP SERPL-CCNC: 69 U/L (ref 45–120)
ALT SERPL W P-5'-P-CCNC: 38 U/L (ref 0–45)
ANION GAP SERPL CALCULATED.3IONS-SCNC: 10 MMOL/L (ref 5–18)
AST SERPL W P-5'-P-CCNC: 26 U/L (ref 0–40)
BASOPHILS # BLD AUTO: 0 10E3/UL (ref 0–0.2)
BASOPHILS NFR BLD AUTO: 1 %
BILIRUB SERPL-MCNC: 0.5 MG/DL (ref 0–1)
BUN SERPL-MCNC: 11 MG/DL (ref 8–28)
CALCIUM SERPL-MCNC: 9.6 MG/DL (ref 8.5–10.5)
CHLORIDE BLD-SCNC: 100 MMOL/L (ref 98–107)
CHOLEST SERPL-MCNC: 163 MG/DL
CO2 SERPL-SCNC: 27 MMOL/L (ref 22–31)
CREAT SERPL-MCNC: 0.66 MG/DL (ref 0.6–1.1)
CREAT UR-MCNC: 36 MG/DL
EOSINOPHIL # BLD AUTO: 0.2 10E3/UL (ref 0–0.7)
EOSINOPHIL NFR BLD AUTO: 3 %
ERYTHROCYTE [DISTWIDTH] IN BLOOD BY AUTOMATED COUNT: 12.5 % (ref 10–15)
FASTING STATUS PATIENT QL REPORTED: NO
GFR SERPL CREATININE-BSD FRML MDRD: 89 ML/MIN/1.73M2
GLUCOSE BLD-MCNC: 94 MG/DL (ref 70–125)
HCT VFR BLD AUTO: 37.8 % (ref 35–47)
HDLC SERPL-MCNC: 79 MG/DL
HGB BLD-MCNC: 12.5 G/DL (ref 11.7–15.7)
IMM GRANULOCYTES # BLD: 0 10E3/UL
IMM GRANULOCYTES NFR BLD: 0 %
LDLC SERPL CALC-MCNC: 73 MG/DL
LYMPHOCYTES # BLD AUTO: 2.2 10E3/UL (ref 0.8–5.3)
LYMPHOCYTES NFR BLD AUTO: 35 %
MCH RBC QN AUTO: 31.3 PG (ref 26.5–33)
MCHC RBC AUTO-ENTMCNC: 33.1 G/DL (ref 31.5–36.5)
MCV RBC AUTO: 95 FL (ref 78–100)
MONOCYTES # BLD AUTO: 0.6 10E3/UL (ref 0–1.3)
MONOCYTES NFR BLD AUTO: 10 %
NEUTROPHILS # BLD AUTO: 3.3 10E3/UL (ref 1.6–8.3)
NEUTROPHILS NFR BLD AUTO: 53 %
PLATELET # BLD AUTO: 279 10E3/UL (ref 150–450)
POTASSIUM BLD-SCNC: 4.1 MMOL/L (ref 3.5–5)
PROT SERPL-MCNC: 6.5 G/DL (ref 6–8)
RBC # BLD AUTO: 3.99 10E6/UL (ref 3.8–5.2)
SODIUM SERPL-SCNC: 137 MMOL/L (ref 136–145)
TRIGL SERPL-MCNC: 53 MG/DL
WBC # BLD AUTO: 6.3 10E3/UL (ref 4–11)

## 2021-11-10 PROCEDURE — 99397 PER PM REEVAL EST PAT 65+ YR: CPT | Performed by: PEDIATRICS

## 2021-11-10 PROCEDURE — 80307 DRUG TEST PRSMV CHEM ANLYZR: CPT | Performed by: PEDIATRICS

## 2021-11-10 PROCEDURE — 80061 LIPID PANEL: CPT | Performed by: PEDIATRICS

## 2021-11-10 PROCEDURE — 85025 COMPLETE CBC W/AUTO DIFF WBC: CPT | Performed by: PEDIATRICS

## 2021-11-10 PROCEDURE — 80053 COMPREHEN METABOLIC PANEL: CPT | Performed by: PEDIATRICS

## 2021-11-10 PROCEDURE — 36415 COLL VENOUS BLD VENIPUNCTURE: CPT | Performed by: PEDIATRICS

## 2021-11-10 PROCEDURE — 99214 OFFICE O/P EST MOD 30 MIN: CPT | Mod: 25 | Performed by: PEDIATRICS

## 2021-11-10 RX ORDER — FLUTICASONE PROPIONATE 50 MCG
1 SPRAY, SUSPENSION (ML) NASAL DAILY
Qty: 16 G | Refills: 1 | Status: SHIPPED | OUTPATIENT
Start: 2021-11-10 | End: 2022-02-09

## 2021-11-10 RX ORDER — GABAPENTIN 300 MG/1
300 CAPSULE ORAL 4 TIMES DAILY
Qty: 360 CAPSULE | Refills: 3 | Status: SHIPPED | OUTPATIENT
Start: 2021-11-10

## 2021-11-10 RX ORDER — RAMELTEON 8 MG/1
8 TABLET ORAL AT BEDTIME
Qty: 90 TABLET | Refills: 0 | Status: SHIPPED | OUTPATIENT
Start: 2021-11-10 | End: 2022-02-09

## 2021-11-10 RX ORDER — ALBUTEROL SULFATE 90 UG/1
2 AEROSOL, METERED RESPIRATORY (INHALATION) EVERY 6 HOURS PRN
Qty: 18 G | Refills: 1 | Status: SHIPPED | OUTPATIENT
Start: 2021-11-10 | End: 2022-02-09

## 2021-11-10 RX ORDER — TRIAMTERENE AND HYDROCHLOROTHIAZIDE 37.5; 25 MG/1; MG/1
1 CAPSULE ORAL DAILY
Qty: 90 CAPSULE | Refills: 0 | Status: SHIPPED | OUTPATIENT
Start: 2021-11-10 | End: 2022-01-10

## 2021-11-10 RX ORDER — BACLOFEN 10 MG/1
10 TABLET ORAL AT BEDTIME
Qty: 90 TABLET | Refills: 3 | Status: CANCELLED | OUTPATIENT
Start: 2021-11-10

## 2021-11-10 RX ORDER — ZOLPIDEM TARTRATE 5 MG/1
TABLET ORAL
Qty: 90 TABLET | Refills: 0 | Status: SHIPPED | OUTPATIENT
Start: 2021-11-10 | End: 2021-12-31

## 2021-11-10 RX ORDER — ROSUVASTATIN CALCIUM 20 MG/1
20 TABLET, COATED ORAL DAILY
Qty: 90 TABLET | Refills: 0 | Status: SHIPPED | OUTPATIENT
Start: 2021-11-10 | End: 2022-02-09

## 2021-11-10 RX ORDER — LANOLIN ALCOHOL/MO/W.PET/CERES
3 CREAM (GRAM) TOPICAL
Status: CANCELLED | OUTPATIENT
Start: 2021-11-10

## 2021-11-10 ASSESSMENT — ACTIVITIES OF DAILY LIVING (ADL): CURRENT_FUNCTION: NO ASSISTANCE NEEDED

## 2021-11-10 ASSESSMENT — MIFFLIN-ST. JEOR: SCORE: 1375.44

## 2021-11-10 NOTE — PATIENT INSTRUCTIONS
Patient Education      Go to the lab today. We will notify you with the results once those are available.    They will call for mammogram    Try the ramelteon for sleep instead of ambien.    I placed a specialty referral during today's visit for: [facility]. You should expect to hear from someone to schedule. If you are having issues with this, please message me through [email or web address] or call our clinic at [phone] (press option 2 to speak with someone from our [facility] team).    GENERAL INFORMATION AND HELPFUL NUMBERS:    If labs were ordered today, please go to the outpatient lab located in the same building. Once the results are back, we will notify you with results.    If imaging was ordered to be done today, please go to [facility] (located in the same building across our [facility]). Once the results are back, we will notify you with results.    If imaging was ordered to be done in the future at an [facility] facility, someone will call to schedule otherwise you may also call [phone] to schedule.    If a specialty referral was placed during today's visit, someone will call to schedule unless you were instructed to call yourself. If you are having issues with this, please message me through [employer] or call our clinic at [phone] (press option 2 to speak with someone from our [facility] team).    If a mammogram was ordered during today's visit, someone will call to schedule otherwise you may also call [phone] to schedule     If a heart ultrasound or stress test was ordered today, someone will call to schedule otherwise you may also call the main Cardiology clinic at [phone] to schedule.    If a bone density scan was ordered today, someone will call to schedule otherwise you may also call [phone] to schedule.    If you have any questions or concerns after our visit today, please message me through [employer] or call our clinic at [phone] (press option 2 to speak  with someone from our [facility] team).                    Personalized Prevention Plan  You are due for the preventive services outlined below.  Your care team is available to assist you in scheduling these services.  If you have already completed any of these items, please share that information with your care team to update in your medical record.  Health Maintenance Due   Topic Date Due     ANNUAL REVIEW OF  ORDERS  Never done     Zoster (Shingles) Vaccine (2 of 3) 02/14/2011     LUNG CANCER SCREENING  06/06/2017     Flu Vaccine (1) 09/01/2021     COVID-19 Vaccine (3 - Booster for Moderna series) 09/06/2021       Exercise for a Healthier Heart  You may wonder how you can improve the health of your heart. If you re thinking about exercise, you re on the right track. You don t need to become an athlete. But you do need a certain amount of brisk exercise to help strengthen your heart. If you have been diagnosed with a heart condition, your healthcare provider may advise exercise to help stabilize your condition. To help make exercise a habit, choose safe, fun activities.      Exercise with a friend. When activity is fun, you're more likely to stick with it.   Before you start  Check with your healthcare provider before starting an exercise program. This is especially important if you have not been active for a while. It's also important if you have a long-term (chronic) health problem such as heart disease, diabetes, or obesity. Or if you are at high risk for having these problems.   Why exercise?  Exercising regularly offers many healthy rewards. It can help you do all of the following:     Improve your blood cholesterol level to help prevent further heart trouble    Lower your blood pressure to help prevent a stroke or heart attack    Control diabetes, or reduce your risk of getting this disease    Improve your heart and lung function    Reach and stay at a healthy weight    Make your muscles stronger so you can  stay active    Prevent falls and fractures by slowing the loss of bone mass (osteoporosis)    Manage stress better    Reduce your blood pressure    Improve your sense of self and your body image  Exercise tips      Ease into your routine. Set small goals. Then build on them. If you are not sure what your activity level should be, talk with your healthcare provider first before starting an exercise routine.    Exercise on most days. Aim for a total of 150 minutes (2 hours and 30 minutes) or more of moderate-intensity aerobic activity each week. Or 75 minutes (1 hour and 15 minutes) or more of vigorous-intensity aerobic activity each week. Or try for a combination of both. Moderate activity means that you breathe heavier and your heart rate increases but you can still talk. Think about doing 40 minutes of moderate exercise, 3 to 4 times a week. For best results, activity should last for about 40 minutes to lower blood pressure and cholesterol. It's OK to work up to the 40-minute period over time. Examples of moderate-intensity activity are walking 1 mile in 15 minutes. Or doing 30 to 45 minutes of yard work.    Step up your daily activity level.  Along with your exercise program, try being more active the whole day. Walk instead of drive. Or park further away so that you take more steps each day. Do more household tasks or yard work. You may not be able to meet the advised mount of physical activity. But doing some moderate- or vigorous-intensity aerobic activity can help reduce your risk for heart disease. Your healthcare provider can help you figure out what is best for you.    Choose 1 or more activities you enjoy.  Walking is one of the easiest things you can do. You can also try swimming, riding a bike, dancing, or taking an exercise class.    When to call your healthcare provider  Call your healthcare provider if you have any of these:     Chest pain or feel dizzy or lightheaded    Burning, tightness, pressure,  or heaviness in your chest, neck, shoulders, back, or arms    Abnormal shortness of breath    More joint or muscle pain    A very fast or irregular heartbeat (palpitations)  [employer] last reviewed this educational content on 7/1/2019 2000-2021 [employer]. All rights reserved. This information is not intended as a substitute for professional medical care. Always follow your healthcare professional's instructions.          Urinary Incontinence, Female (Adult)   Urinary incontinence means loss of bladder control. This problem affects many women, especially as they get older. If you have incontinence, you may be embarrassed to ask for help. But know that this problem can be treated.   Types of Incontinence  There are different types of incontinence. Two of the main types are described here. You can have more than one type.     Stress incontinence. With this type, urine leaks when pressure (stress) is put on the bladder. This may happen when you cough, sneeze, or laugh. Stress incontinence most often occurs because the pelvic floor muscles that support the bladder and urethra are weak. This can happen after pregnancy and vaginal childbirth or a hysterectomy. It can also be due to excess body weight or hormone changes.    Urge incontinence (also called overactive bladder). With this type, a sudden urge to urinate is felt often. This may happen even though there may not be much urine in the bladder. The need to urinate often during the night is common. Urge incontinence most often occurs because of bladder spasms. This may be due to bladder irritation or infection. Damage to bladder nerves or pelvic muscles, constipation, and certain medicines can also lead to urge incontinence.  Treatment depends on the cause. Further evaluation is needed to find the type you have. This will likely include an exam and certain tests. Based on the results, you and your healthcare provider can then plan treatment. Until a  diagnosis is made, the home care tips below can help ease symptoms.   Home care    Do pelvic floor muscle exercises, if they are prescribed. The pelvic floor muscles help support the bladder and urethra. Many women find that their symptoms improve when doing special exercises that strengthen these muscles. To do the exercises, contract the muscles you would use to stop your stream of urine. But do this when you re not urinating. Hold for 10 seconds, then relax. Repeat 10 to 20 times in a row, at least 3 times a day. Your healthcare provider may give you other instructions for how to do the exercises and how often.    Keep a bladder diary. This helps track how often and how much you urinate over a set period of time. Bring this diary with you to your next visit with the provider. The information can help your provider learn more about your bladder problem.    Lose weight, if advised to by your provider. Extra weight puts pressure on the bladder. Your provider can help you create a weight-loss plan that s right for you. This may include exercising more and making certain diet changes.    Don't have foods and drinks that may irritate the bladder. These can include alcohol and caffeinated drinks.    Quit smoking. Smoking and other tobacco use can lead to a long-term (chronic) cough that strains the pelvic floor muscles. Smoking may also damage the bladder and urethra. Talk with your provider about treatments or methods you can use to quit smoking.    If drinking large amounts of fluid makes you have symptoms, you may be advised to limit your fluid intake. You may also be advised to drink most of your fluids during the day and to limit fluids at night.    If you re worried about urine leakage or accidents, you may wear absorbent pads to catch urine. Change the pads often. This helps reduce discomfort. It may also reduce the risk of skin or bladder infections.    Follow-up care  Follow up with your healthcare provider, or  as directed. It may take some to find the right treatment for your problem. But healthy lifestyle changes can be made right away. These include such things as exercising on a regular basis, eating a healthy diet, losing weight (if needed), and quitting smoking. Your treatment plan may include special therapies or medicines. Certain procedures or surgery may also be options. Talk about any questions you have with your provider.   When to seek medical advice  Call the healthcare provider right away if any of these occur:    Fever of 100.4 F (38 C) or higher, or as directed by your provider    Bladder pain or fullness    Belly swelling    Nausea or vomiting    Back pain    Weakness, dizziness, or fainting  [employer] last reviewed this educational content on 1/1/2020 2000-2021 [employer]. All rights reserved. This information is not intended as a substitute for professional medical care. Always follow your healthcare professional's instructions.             Exercise for a Healthier Heart  You may wonder how you can improve the health of your heart. If you re thinking about exercise, you re on the right track. You don t need to become an athlete. But you do need a certain amount of brisk exercise to help strengthen your heart. If you have been diagnosed with a heart condition, your healthcare provider may advise exercise to help stabilize your condition. To help make exercise a habit, choose safe, fun activities.      Exercise with a friend. When activity is fun, you're more likely to stick with it.   Before you start  Check with your healthcare provider before starting an exercise program. This is especially important if you have not been active for a while. It's also important if you have a long-term (chronic) health problem such as heart disease, diabetes, or obesity. Or if you are at high risk for having these problems.   Why exercise?  Exercising regularly offers many healthy rewards. It can help  you do all of the following:     Improve your blood cholesterol level to help prevent further heart trouble    Lower your blood pressure to help prevent a stroke or heart attack    Control diabetes, or reduce your risk of getting this disease    Improve your heart and lung function    Reach and stay at a healthy weight    Make your muscles stronger so you can stay active    Prevent falls and fractures by slowing the loss of bone mass (osteoporosis)    Manage stress better    Reduce your blood pressure    Improve your sense of self and your body image  Exercise tips      Ease into your routine. Set small goals. Then build on them. If you are not sure what your activity level should be, talk with your healthcare provider first before starting an exercise routine.    Exercise on most days. Aim for a total of 150 minutes (2 hours and 30 minutes) or more of moderate-intensity aerobic activity each week. Or 75 minutes (1 hour and 15 minutes) or more of vigorous-intensity aerobic activity each week. Or try for a combination of both. Moderate activity means that you breathe heavier and your heart rate increases but you can still talk. Think about doing 40 minutes of moderate exercise, 3 to 4 times a week. For best results, activity should last for about 40 minutes to lower blood pressure and cholesterol. It's OK to work up to the 40-minute period over time. Examples of moderate-intensity activity are walking 1 mile in 15 minutes. Or doing 30 to 45 minutes of yard work.    Step up your daily activity level.  Along with your exercise program, try being more active the whole day. Walk instead of drive. Or park further away so that you take more steps each day. Do more household tasks or yard work. You may not be able to meet the advised mount of physical activity. But doing some moderate- or vigorous-intensity aerobic activity can help reduce your risk for heart disease. Your healthcare provider can help you figure out what is  best for you.    Choose 1 or more activities you enjoy.  Walking is one of the easiest things you can do. You can also try swimming, riding a bike, dancing, or taking an exercise class.    When to call your healthcare provider  Call your healthcare provider if you have any of these:     Chest pain or feel dizzy or lightheaded    Burning, tightness, pressure, or heaviness in your chest, neck, shoulders, back, or arms    Abnormal shortness of breath    More joint or muscle pain    A very fast or irregular heartbeat (palpitations)  [employer] last reviewed this educational content on 7/1/2019 2000-2021 [employer]. All rights reserved. This information is not intended as a substitute for professional medical care. Always follow your healthcare professional's instructions.          Urinary Incontinence, Female (Adult)   Urinary incontinence means loss of bladder control. This problem affects many women, especially as they get older. If you have incontinence, you may be embarrassed to ask for help. But know that this problem can be treated.   Types of Incontinence  There are different types of incontinence. Two of the main types are described here. You can have more than one type.     Stress incontinence. With this type, urine leaks when pressure (stress) is put on the bladder. This may happen when you cough, sneeze, or laugh. Stress incontinence most often occurs because the pelvic floor muscles that support the bladder and urethra are weak. This can happen after pregnancy and vaginal childbirth or a hysterectomy. It can also be due to excess body weight or hormone changes.    Urge incontinence (also called overactive bladder). With this type, a sudden urge to urinate is felt often. This may happen even though there may not be much urine in the bladder. The need to urinate often during the night is common. Urge incontinence most often occurs because of bladder spasms. This may be due to bladder irritation  or infection. Damage to bladder nerves or pelvic muscles, constipation, and certain medicines can also lead to urge incontinence.  Treatment depends on the cause. Further evaluation is needed to find the type you have. This will likely include an exam and certain tests. Based on the results, you and your healthcare provider can then plan treatment. Until a diagnosis is made, the home care tips below can help ease symptoms.   Home care    Do pelvic floor muscle exercises, if they are prescribed. The pelvic floor muscles help support the bladder and urethra. Many women find that their symptoms improve when doing special exercises that strengthen these muscles. To do the exercises, contract the muscles you would use to stop your stream of urine. But do this when you re not urinating. Hold for 10 seconds, then relax. Repeat 10 to 20 times in a row, at least 3 times a day. Your healthcare provider may give you other instructions for how to do the exercises and how often.    Keep a bladder diary. This helps track how often and how much you urinate over a set period of time. Bring this diary with you to your next visit with the provider. The information can help your provider learn more about your bladder problem.    Lose weight, if advised to by your provider. Extra weight puts pressure on the bladder. Your provider can help you create a weight-loss plan that s right for you. This may include exercising more and making certain diet changes.    Don't have foods and drinks that may irritate the bladder. These can include alcohol and caffeinated drinks.    Quit smoking. Smoking and other tobacco use can lead to a long-term (chronic) cough that strains the pelvic floor muscles. Smoking may also damage the bladder and urethra. Talk with your provider about treatments or methods you can use to quit smoking.    If drinking large amounts of fluid makes you have symptoms, you may be advised to limit your fluid intake. You may also  be advised to drink most of your fluids during the day and to limit fluids at night.    If you re worried about urine leakage or accidents, you may wear absorbent pads to catch urine. Change the pads often. This helps reduce discomfort. It may also reduce the risk of skin or bladder infections.    Follow-up care  Follow up with your healthcare provider, or as directed. It may take some to find the right treatment for your problem. But healthy lifestyle changes can be made right away. These include such things as exercising on a regular basis, eating a healthy diet, losing weight (if needed), and quitting smoking. Your treatment plan may include special therapies or medicines. Certain procedures or surgery may also be options. Talk about any questions you have with your provider.   When to seek medical advice  Call the healthcare provider right away if any of these occur:    Fever of 100.4 F (38 C) or higher, or as directed by your provider    Bladder pain or fullness    Belly swelling    Nausea or vomiting    Back pain    Weakness, dizziness, or fainting  [employer] last reviewed this educational content on 1/1/2020 2000-2021 [employer]. All rights reserved. This information is not intended as a substitute for professional medical care. Always follow your healthcare professional's instructions.

## 2021-11-10 NOTE — PROGRESS NOTES
"SUBJECTIVE:   Maria Elena Sprague is a 71 year old female who presents for Preventive Visit.    For sleep, ambien and melatonin. Tried trazodone in past that wasn't helpful.     Gabapentin is helping    Had shingles in August, wasn't severe  Big spot  Wasn't too painful      Patient has been advised of split billing requirements and indicates understanding: Yes   Are you in the first 12 months of your Medicare coverage?  No    Healthy Habits:     In general, how would you rate your overall health?  Good    Frequency of exercise:  1 day/week    Duration of exercise:  Less than 15 minutes    Do you usually eat at least 4 servings of fruit and vegetables a day, include whole grains    & fiber and avoid regularly eating high fat or \"junk\" foods?  Yes    Taking medications regularly:  Yes    Medication side effects:  None    Ability to successfully perform activities of daily living:  No assistance needed    Home Safety:  No safety concerns identified    Hearing Impairment:  No hearing concerns    In the past 6 months, have you been bothered by leaking of urine? Yes    In general, how would you rate your overall mental or emotional health?  Excellent      PHQ-2 Total Score: 0    Additional concerns today:  Yes    Do you feel safe in your environment? Yes    Have you ever done Advance Care Planning? (For example, a Health Directive, POLST, or a discussion with a medical provider or your loved ones about your wishes): Yes, patient states has an Advance Care Planning document and will bring a copy to the clinic.       Fall risk  Fallen 2 or more times in the past year?: No  Any fall with injury in the past year?: No    Cognitive Screening   1) Repeat 3 items (Leader, Season, Table)    2) Clock draw: NORMAL  3) 3 item recall: Recalls 3 objects  Results: 3 items recalled: COGNITIVE IMPAIRMENT LESS LIKELY    Mini-CogTM Copyright S Juanis. Licensed by the author for use in [facility]; reprinted with permission " "(marian@[email or web address]). All rights reserved.      Reviewed and updated as needed this visit by clinical staff  Tobacco  Allergies  Meds             Reviewed and updated as needed this visit by Provider  Tobacco  Allergies  Meds            Social History     Tobacco Use     Smoking status: Former Smoker     Packs/day: 0.50     Years: 4.00     Pack years: 2.00     Types: Cigarettes     Start date: 1969     Quit date: 1973     Years since quittin.4     Smokeless tobacco: Never Used     Tobacco comment: quit about 50 years ago   Substance Use Topics     Alcohol use: Yes     Comment: about 2X a week     If you drink alcohol do you typically have >3 drinks per day or >7 drinks per week? No    Alcohol Use 11/10/2021   Prescreen: >3 drinks/day or >7 drinks/week? -   Prescreen: >3 drinks/day or >7 drinks/week? No               Current providers sharing in care for this patient include:   Patient Care Team:  Flor Correa MD as PCP - General (Internal Medicine - Pediatrics)  Julito Silvestre MD as MD (Neurology)  Julito Silvestre MD as Assigned Neuroscience Provider  Flor Correa MD as Assigned PCP    The following health maintenance items are reviewed in Epic and correct as of today:  Health Maintenance Due   Topic Date Due     ZOSTER IMMUNIZATION (2 of 3) 2011       OBJECTIVE:   /70 (BP Location: Right arm, Patient Position: Sitting, Cuff Size: Adult Large)   Ht 1.626 m (5' 4\")   Wt 87.5 kg (193 lb)   BMI 33.13 kg/m   Estimated body mass index is 33.13 kg/m  as calculated from the following:    Height as of this encounter: 1.626 m (5' 4\").    Weight as of this encounter: 87.5 kg (193 lb).  Physical Exam  GENERAL: healthy, alert and no distress  RESP: lungs clear to auscultation - no rales, rhonchi or wheezes  CV: regular rate and rhythm, normal S1 S2, no S3 or S4, no murmur, click or rub, no peripheral edema and peripheral pulses strong  ABDOMEN: soft, nontender, no " hepatosplenomegaly, no masses and bowel sounds normal  MS: no gross musculoskeletal defects noted, no edema      ASSESSMENT / PLAN:     Assessment & Plan   Problem List Items Addressed This Visit     Cerebral infarction due to thrombosis of right middle cerebral artery (H) (Chronic)     Followed by Neurology. Hx of CVA (right centrum semiovale small vessel, October 2014). On ASA 325mg daily, rosuvastatin 20mg daily.             Relevant Medications    rosuvastatin (CRESTOR) 20 MG tablet    Lumbar radiculopathy (Chronic)     She is on gabapentin 300mg 4x per day and baclofen 10mg at bedtime.         Relevant Medications    zolpidem (AMBIEN) 5 MG tablet    ramelteon (ROZEREM) 8 MG tablet    gabapentin (NEURONTIN) 300 MG capsule    aspirin (ASA) 325 MG EC tablet    Benign essential hypertension     Well controlled on triamterene-hydrochlorothiazide.          Relevant Medications    triamterene-HCTZ (DYAZIDE) 37.5-25 MG capsule    Other Relevant Orders    Lipid panel reflex to direct LDL Fasting (Completed)    Comprehensive metabolic panel (BMP + Alb, Alk Phos, ALT, AST, Total. Bili, TP) (Completed)    Comprehensive Weight Management    MA Screen Bilateral w/Asim    CBC with platelets and differential (Completed)    Family history of malignant neoplasm of ovary     Hx of mother with ovarian cancer and so she has been followed by gynecology with pelvic ultrasound every so often. Would like referral to OB/GYN.            Relevant Orders    Ob/Gyn Referral    Hyperlipidemia     On rosuvastatin.    The 10-year ASCVD risk score (Bj HERNANDEZ Jr., et al., 2013) is: 9.5%    Values used to calculate the score:      Age: 71 years      Sex: Female      Is Non- : No      Diabetic: No      Tobacco smoker: No      Systolic Blood Pressure: 128 mmHg      Is BP treated: No      HDL Cholesterol: 79 mg/dL      Total Cholesterol: 163 mg/dL          Relevant Medications    rosuvastatin (CRESTOR) 20 MG tablet    Insomnia  "    She is on ambien and melatonin. Has tried trazodone in the past, not effective. Discussed better agents than ambien or melatonin for sleep. She would like to try ramelteon.          Relevant Medications    zolpidem (AMBIEN) 5 MG tablet    ramelteon (ROZEREM) 8 MG tablet    MTHFR mutation     She is on aspirin 325mg daily,  B complex, folate, B6, B12 for her MTHFR         Wheezing     History of wheezing with respiratory infections, PRN albuterol.         Relevant Medications    albuterol (PROAIR HFA/PROVENTIL HFA/VENTOLIN HFA) 108 (90 Base) MCG/ACT inhaler    fluticasone (FLONASE) 50 MCG/ACT nasal spray    Encounter for screening mammogram for malignant neoplasm of breast     Relevant Orders    MA Screen Bilateral w/Asim    Encounter for long-term (current) use of medications    Relevant Orders    [id number] - Urine Drug Confirmation Panel (Comprehensive) (Completed)      Other Visit Diagnoses     Encounter for Medicare annual wellness exam    -  Primary           Return in about 6 months (around 5/10/2022) for Follow up, with me.    Flor Correa MD  [facility]      Patient has been advised of split billing requirements and indicates understanding: per MA/    COUNSELING:  Reviewed preventive health counseling, as reflected in patient instructions    Estimated body mass index is 33.13 kg/m  as calculated from the following:    Height as of this encounter: 1.626 m (5' 4\").    Weight as of this encounter: 87.5 kg (193 lb).    Weight management plan: Discussed healthy diet and exercise guidelines    She reports that she quit smoking about 48 years ago. Her smoking use included cigarettes. She started smoking about 52 years ago. She has a 2.00 pack-year smoking history. She has never used smokeless tobacco.      Appropriate preventive services were discussed with this patient, including applicable screening as appropriate for cardiovascular disease, diabetes, " osteopenia/osteoporosis, and glaucoma.  As appropriate for age/gender, discussed screening for colorectal cancer, prostate cancer, breast cancer, and cervical cancer. Checklist reviewing preventive services available has been given to the patient.    Reviewed patients plan of care and provided an AVS. The Basic Care Plan (routine screening as documented in Health Maintenance) for Maria Elena meets the Care Plan requirement. This Care Plan has been established and reviewed with the Patient.    Counseling Resources:  ATP IV Guidelines  Pooled Cohorts Equation Calculator  Breast Cancer Risk Calculator  Breast Cancer: Medication to Reduce Risk  FRAX Risk Assessment  ICSI Preventive Guidelines  Dietary Guidelines for Americans, 2010  [employer]'s MyPlate  ASA Prophylaxis  Lung CA Screening    Flor Correa MD  [facility]    Identified Health Risks:    She is at risk for lack of exercise and has been provided with information to increase physical activity for the benefit of her well-being.  Information on urinary incontinence and treatment options given to patient.

## 2021-11-10 NOTE — LETTER
Opioid / Opioid Plus Controlled Substance Agreement    This is an agreement between you and your provider about the safe and appropriate use of controlled substance/opioids prescribed by your care team. Controlled substances are medicines that can cause physical and mental dependence (abuse).    There are strict laws about having and using these medicines. We here at [facility] are committing to working with you in your efforts to get better. To support you in this work, we ll help you schedule regular office appointments for medicine refills. If we must cancel or change your appointment for any reason, we ll make sure you have enough medicine to last until your next appointment.     As a Provider, I will:    Listen carefully to your concerns and treat you with respect.     Recommend a treatment plan that I believe is in your best interest. This plan may involve therapies other than opioid pain medication.     Talk with you often about the possible benefits, and the risk of harm of any medicine that we prescribe for you.     Provide a plan on how to taper (discontinue or go off) using this medicine if the decision is made to stop its use.    As a Patient, I understand that opioid(s):     Are a controlled substance prescribed by my care team to help me function or work and manage my condition(s).     Are strong medicines and can cause serious side effects such as:    Drowsiness, which can seriously affect my driving ability    A lower breathing rate, enough to cause death    Harm to my thinking ability     Depression     Abuse of and addiction to this medicine    Need to be taken exactly as prescribed. Combining opioids with certain medicines or chemicals (such as illegal drugs, sedatives, sleeping pills, and benzodiazepines) can be dangerous or even fatal. If I stop opioids suddenly, I may have severe withdrawal symptoms.    Do not work for all types of pain nor for all patients. If they re not helpful, I may  be asked to stop them.    {Benzo / Stimulant (Optional):492249}    The risks, benefits and side effects of these medicine(s) were explained to me. I agree that:  1. I will take part in other treatments as advised by my care team. This may be psychiatry or counseling, physical therapy, behavioral therapy, group treatment or a referral to a specialist.     2. I will keep all my appointments. I understand that this is part of the monitoring of opioids. My care team may require an office visit for EVERY opioid/controlled substance refill. If I miss appointments or don t follow instructions, my care team may stop my medicine.    3. I will take my medicines as prescribed. I will not change the dose or schedule unless my care team tells me to. There will be no refills if I run out early.     4. I may be asked to come to the clinic and complete a urine drug test or complete a pill count at any time. If I don t give a urine sample or participate in a pill count, the care team may stop my medicine.    5. I will only receive prescriptions from this clinic for chronic pain. If I am treated by another provider for acute pain issues, I will tell them that I am taking opioid pain medication for chronic pain and that I have a treatment agreement with this provider. I will inform my [facility] care team within one business day if I am given a prescription for any pain medication by another healthcare provider. My [facility] care team can contact other providers and pharmacists about my use of any medicines.    6. It is up to me to make sure that I don t run out of my medicines on weekends or holidays. If my care team is willing to refill my opioid prescription without a visit, I must request refills only during office hours. Refills may take up to 3 business days to process. I will use one pharmacy to fill all my opioid and other controlled substance prescriptions. I will notify the clinic about any changes to my  insurance or medication availability.    7. I am responsible for my prescriptions. If the medicine/prescription is lost, stolen or destroyed, it will not be replaced. I also agree not to share controlled substance medicines with anyone.    8. I am aware I should not use any illegal or recreational drugs. I agree not to drink alcohol unless my care team says I can.       9. If I enroll in the [place] Medical Cannabis program, I will tell my care team prior to my next refill.     10. I will tell my care team right away if I become pregnant, have a new medical problem treated outside of my regular clinic, or have a change in my medications.    11. I understand that this medicine can affect my thinking, judgment and reaction time. Alcohol and drugs affect the brain and body, which can affect the safety of my driving. Being under the influence of alcohol or drugs can affect my decision-making, behaviors, personal safety, and the safety of others. Driving while impaired (DWI) can occur if a person is driving, operating, or in physical control of a car, motorcycle, boat, snowmobile, ATV, motorbike, off-road vehicle, or any other motor vehicle ([place] Statute 169A.20). I understand the risk if I choose to drive or operate any vehicle or machinery.    I understand that if I do not follow any of the conditions above, my prescriptions or treatment may be stopped or changed.          Opioids  What You Need to Know    What are opioids?   Opioids are pain medicines that must be prescribed by a doctor. They are also known as narcotics.     Examples are:   1. morphine (MS Contin, Eliza)  2. oxycodone (Oxycontin)  3. oxycodone and acetaminophen (Percocet)  4. hydrocodone and acetaminophen (Vicodin, Norco)   5. fentanyl patch (Duragesic)   6. hydromorphone (Dilaudid)   7. methadone  8. codeine (Tylenol #3)     What do opioids do well?   Opioids are best for severe short-term pain such as after a surgery or injury. They may work well  for cancer pain. They may help some people with long-lasting (chronic) pain.     What do opioids NOT do well?   Opioids never get rid of pain entirely, and they don t work well for most patients with chronic pain. Opioids don t reduce swelling, one of the causes of pain.                                    Other ways to manage chronic pain and improve function include:       Treat the health problem that may be causing pain    Anti-inflammation medicines, which reduce swelling and tenderness, such as ibuprofen (Advil, Motrin) or naproxen (Aleve)    Acetaminophen (Tylenol)    Antidepressants and anti-seizure medicines, especially for nerve pain    Topical treatments such as patches or creams    Injections or nerve blocks    Chiropractic or osteopathic treatment    Acupuncture, massage, deep breathing, meditation, visual imagery, aromatherapy    Use heat or ice at the pain site    Physical therapy     Exercise    Stop smoking    Take part in therapy       Risks and side effects     Talk to your doctor before you start or decide to keep taking opioids. Possible side effects include:      Lowering your breathing rate enough to cause death    Overdose, including death, especially if taking higher than prescribed doses    Worse depression symptoms; less pleasure in things you usually enjoy    Feeling tired or sluggish    Slower thoughts or cloudy thinking    Being more sensitive to pain over time; pain is harder to control    Trouble sleeping or restless sleep    Changes in hormone levels (for example, less testosterone)    Changes in sex drive or ability to have sex    Constipation    Unsafe driving    Itching and sweating    Dizziness    Nausea, throwing up and dry mouth    What else should I know about opioids?    Opioids may lead to dependence, tolerance, or addiction.      Dependence means that if you stop or reduce the medicine too quickly, you will have withdrawal symptoms. These include loose poop (diarrhea),  jitters, flu-like symptoms, nervousness and tremors. Dependence is not the same as addiction.                       Tolerance means needing higher doses over time to get the same effect. This may increase the chance of serious side effects.      Addiction is when people improperly use a substance that harms their body, their mind or their relations with others. Use of opiates can cause a relapse of addiction if you have a history of drug or alcohol abuse.      People who have used opioids for a long time may have a lower quality of life, worse depression, higher levels of pain and more visits to doctors.    You can overdose on opioids. Take these steps to lower your risk of overdose:    1. Recognize the signs:  Signs of overdose include decrease or loss of consciousness (blackout), slowed breathing, trouble waking up and blue lips. If someone is worried about overdose, they should call 911.    2. Talk to your doctor about Narcan (naloxone).   If you are at risk for overdose, you may be given a prescription for Narcan. This medicine very quickly reverses the effects of opioids.   If you overdose, a friend or family member can give you Narcan while waiting for the ambulance. They need to know the signs of overdose and how to give Narcan.     3. Don't use alcohol or street drugs.   Taking them with opioids can cause death.    4. Do not take any of these medicines unless your doctor says it s OK. Taking these with opioids can cause death:    Benzodiazepines, such as lorazepam (Ativan), alprazolam (Xanax) or diazepam (Valium)    Muscle relaxers, such as cyclobenzaprine (Flexeril)    Sleeping pills like zolpidem (Ambien)     Other opioids      How to keep you and other people safe while taking opioids:    1. Never share your opioids with others.  Opioid medicines are regulated by the Drug Enforcement Agency ([place]). Selling or sharing medications is a criminal act.    2. Be sure to store opioids in a secure place, locked up  if possible. Young children can easily swallow them and overdose.    3. When you are traveling with your medicines, keep them in the original bottles. If you use a pill box, be sure you also carry a copy of your medicine list from your clinic or pharmacy.    4. Safe disposal of opioids    Most pharmacies have places to get rid of medicine, called disposal kiosks. Medicine disposal options are also available in every [facility]. Search your county and  medication disposal  to find more options. You can find more details at:  https://[email or web address].[email or web address]./living-green/managing-unwanted-medications     I agree that my provider, clinic care team, and pharmacy may work with any city, state or federal law enforcement agency that investigates the misuse, sale, or other diversion of my controlled medicine. I will allow my provider to discuss my care with, or share a copy of, this agreement with any other treating provider, pharmacy or emergency room where I receive care.    I have read this agreement and have asked questions about anything I did not understand.    _______________________________________________________  Patient Signature - Maria Elena Sprague _____________________                   Date     _______________________________________________________  Provider Signature - Flor Correa MD   _____________________                   Date     _______________________________________________________  Witness Signature (required if provider not present while patient signing)   _____________________                   Date

## 2021-11-12 NOTE — RESULT ENCOUNTER NOTE
Mayank Arredondo,    Your cholesterol levels look good.  Your electrolytes, kidney function, liver tests, and fasting glucose are normal.  Blood counts are normal.    Please let me know if you have any questions or concerns,  Dr. Pritchard

## 2021-11-13 LAB — GABAPENTIN UR QL CFM: PRESENT

## 2021-11-16 PROBLEM — H10.509 BLEPHAROCONJUNCTIVITIS: Status: RESOLVED | Noted: 2021-11-10 | Resolved: 2021-11-16

## 2021-11-16 PROBLEM — Z79.899 ENCOUNTER FOR LONG-TERM (CURRENT) USE OF MEDICATIONS: Status: ACTIVE | Noted: 2021-11-16

## 2021-11-16 PROBLEM — R06.2 WHEEZING: Status: ACTIVE | Noted: 2021-11-16

## 2021-11-16 PROBLEM — Z12.31 ENCOUNTER FOR SCREENING MAMMOGRAM FOR MALIGNANT NEOPLASM OF BREAST: Status: ACTIVE | Noted: 2021-11-16

## 2021-11-16 PROBLEM — M54.10 RADICULAR PAIN: Status: RESOLVED | Noted: 2020-12-21 | Resolved: 2021-11-16

## 2021-11-16 PROBLEM — Z86.73 HISTORY OF STROKE: Status: RESOLVED | Noted: 2021-11-10 | Resolved: 2021-11-16

## 2021-11-16 PROBLEM — M79.10 MUSCLE PAIN: Status: RESOLVED | Noted: 2021-11-10 | Resolved: 2021-11-16

## 2021-11-16 PROBLEM — I10 HYPERTENSION: Status: RESOLVED | Noted: 2021-11-10 | Resolved: 2021-11-16

## 2021-11-16 PROBLEM — M54.2 NECK PAIN: Status: RESOLVED | Noted: 2021-11-10 | Resolved: 2021-11-16

## 2021-11-17 NOTE — ASSESSMENT & PLAN NOTE
Followed by Neurology. Hx of CVA (right centrum semiovale small vessel, October 2014). On ASA 325mg daily, rosuvastatin 20mg daily.

## 2021-11-17 NOTE — ASSESSMENT & PLAN NOTE
She is on ambien and melatonin. Has tried trazodone in the past, not effective. Discussed better agents than ambien or melatonin for sleep. She would like to try ramelteon.

## 2021-11-17 NOTE — ASSESSMENT & PLAN NOTE
Hx of mother with ovarian cancer and so she has been followed by gynecology with pelvic ultrasound every so often. Would like referral to OB/GYN.

## 2021-12-29 ENCOUNTER — MYC MEDICAL ADVICE (OUTPATIENT)
Dept: INTERNAL MEDICINE | Facility: CLINIC | Age: 71
End: 2021-12-29

## 2021-12-29 ENCOUNTER — E-VISIT (OUTPATIENT)
Dept: FAMILY MEDICINE | Facility: CLINIC | Age: 71
End: 2021-12-29
Payer: COMMERCIAL

## 2021-12-29 ENCOUNTER — TELEPHONE (OUTPATIENT)
Dept: NURSING | Facility: CLINIC | Age: 71
End: 2021-12-29

## 2021-12-29 DIAGNOSIS — Z20.822 SUSPECTED COVID-19 VIRUS INFECTION: Primary | ICD-10-CM

## 2021-12-29 DIAGNOSIS — F51.01 PRIMARY INSOMNIA: ICD-10-CM

## 2021-12-29 PROCEDURE — 99421 OL DIG E/M SVC 5-10 MIN: CPT | Performed by: PHYSICIAN ASSISTANT

## 2021-12-29 NOTE — PATIENT INSTRUCTIONS
Maria Elena,      Based on your responses, you may have coronavirus (COVID-19). This illness can cause fever, cough and trouble breathing. Many people get a mild case and get better on their own. Some people can get very sick.    Will I be tested for COVID-19?  We would like to test you for COVID-19 virus. I have placed orders for this test.     To schedule: go to your [employer] home page and scroll down to the section that says  You have an appointment that needs to be scheduled  and click the large green button that says  Schedule Now  and follow the steps to find the next available openings.    This will not be a rapid test.  This will be a PCR test that we usually get back in about 24 hours but sometimes longer.  If you are a rapid test you can buy these at your local pharmacy    If you are unable to complete these [employer] scheduling steps, please call [phone] to schedule your testing.     Return to work/school/ guidance:  Please let your workplace manager and staffing office know when your isolation ends.       If you receive a positive COVID-19 test result, follow the guidance of the those who are giving you the results. Usually the return to work is 10 days from symptom onset or positive test date, (or in some cases 20 days if you are immunocompromised). If your symptoms started after your positive test, the 10 days should start when your symptoms started.   o If you work at [facility], you must also be cleared by Employee Occupational Health and Safety to return to work.      If you receive a negative COVID-19 test result and did not have a high risk exposure to someone with a known positive COVID-19 test, you can return to work once you're free of fever for 24 hours without fever-reducing medication and your symptoms are improving or resolved.    If you receive a negative COVID-19 test and had a high-risk exposure to someone who has tested positive for COVID-19 then you can return to work 14  days after your last contact with the positive individual. Follow quarantine guidance given by your doctor or public health officials.     Sign up for [employer]:  We know it's scary to hear that you might have COVID-19. We want to track your symptoms to make sure you're okay over the next 2 weeks. Please look for an email from [employer]--this is a free, online program that we'll use to keep in touch. To sign up, follow the link in the email you will receive. Learn more at [email or web address]    How can I take care of myself?  Over the counter medications may help with your symptoms like congestion, cough, chills, or fever.   There are not many effective prescription treatments for early COVID-19. Hydroxychloroquine, ivermectin, and azithromycin are not effective or recommended for COVID-19.    If your symptoms started in the last 10 days, you may be able to receive a treatment with monoclonal antibodies. This treatment can lower your risk of severe illness and going to the hospital. It is given through an IV or under your skin (subcutaneous) and must be given at an infusion center. You must be 12 or older, weight at least 88 pounds, and have a positive COVID-19 test.     If you would like to sign up to be considered to receive the monoclonal antibody medicine, please complete a participation form through the [facility] here: MNRAP ([email or web address]://www.health.[facility].mn.us/diseases/coronavirus/mnrap.html). You may also call the [facility] COVID-19 Public Hotline at [phone] (open Mon-Fri: 9am-7pm and Sat: 10am-6pm).     Not all people who are eligible will receive the medicine, since supply is limited. You will be contacted in the next 1 to 2 business days only if you are selected. If you do not receive a call, you have not been selected to receive the medicine. If you have any questions about this medication, please contact your primary care provider. For more information, see  [email or web address].[facility].mn.us/diseases/coronavirus/meds.pdf      Get lots of rest. Drink extra fluids (unless a doctor has told you not to)    Take Tylenol (acetaminophen) or ibuprofen for fever or pain. If you have liver or kidney problems, ask your family doctor if it's okay to take Tylenol o ibuprofen    Take over the counter medications for your symptoms, as directed by your doctor. You may also talk to your pharmacist.      If you have other health problems (like cancer, heart failure, an organ transplant or severe kidney disease): Call your specialty clinic if you don't feel better in the next 2 days.    Know when to call 911. Emergency warning signs include:  o Trouble breathing or shortness of breath  o Pain or pressure in the chest that doesn't go away  o Feeling confused like you haven't felt before, or not being able to wake up  o Bluish-colored lips or face    Where can I get more information?    [facility] - About COVID-19: [email or web address]     CDC - What to Do If You're Sick:     www.cdc.gov/coronavirus/2019-ncov/about/steps-when-sick.html    CDC - Ending Home Isolation:  https://www.cdc.gov/coronavirus/2019-ncov/your-health/quarantine-isolation.html    CDC - Caring for Someone:  www.cdc.gov/coronavirus/2019-ncov/if-you-are-sick/care-for-someone.html    [facility] clinical trials (COVID-19 research studies): clinicalaffairs.[facility]-clinical-trials    Below are the COVID-19 hotlines at the [facility] of [facility] ([facility]). Interpreters are available.  o For health questions: Call 708-137-8067 or 1-641.501.2263 (7 a.m. to 7 p.m.)  o For questions about schools and childcare: Call 232-791-9973 or 1-658.410.5517 (7 a.m. to 7 p.m.)  December 29, 2021  RE:  Maria Elena Sprague                                                                                                                  [address] [facility] [address]      To whom it may concern:    I evaluated Maria Elena  ERNST Sprague on December 29, 2021. Maria Elena Sprague should be excused from work/school.     They should let their workplace manager and staffing office know when their quarantine ends.    We can not give an exact date as it depends on the information below. They can calculate this on their own or work with their manager/staffing office to calculate this. (For example if they were exposed on 10/04, they would have to quarantine for 14 full days. That would be through 10/18. They could return on 10/19.)    Quarantine Guidelines:    If patient receives a positive COVID-19 test result, they should follow the guidance of those who are giving the results. Usually the return to work is 10 (or in some cases 20 days from symptom onset.) If they work at [employer], they must be cleared by Employee Occupational Health and Safety to return to work.      If patient receives a negative COVID-19 test result and did not have a high risk exposure to someone with a known positive COVID-19 test, they can return to work once they're free of fever for 24 hours without fever-reducing medication and their symptoms are improving or resolved.    If patient receives a negative COVID-19 test and if they had a high risk exposure to someone who has tested positive for COVID-19 then they can return to work 14 days after their last contact with the positive individual    Note: If there is ongoing exposure to the covid positive person, this quarantine period may be longer than 14 days. (For example, if they are continually exposed to their child, who tested positive and cannot isolate from them, then the quarantine of 7-14 days can't start until their child is no longer contagious. This is typically 10 days from onset to the child's symptoms. So the total duration may be 17-24 days in this case.)     Sincerely,  DONNIE Ferguson

## 2021-12-30 ENCOUNTER — LAB (OUTPATIENT)
Dept: URGENT CARE | Facility: URGENT CARE | Age: 71
End: 2021-12-30
Attending: PHYSICIAN ASSISTANT
Payer: COMMERCIAL

## 2021-12-30 DIAGNOSIS — Z20.822 SUSPECTED COVID-19 VIRUS INFECTION: ICD-10-CM

## 2021-12-30 PROCEDURE — U0005 INFEC AGEN DETEC AMPLI PROBE: HCPCS

## 2021-12-30 PROCEDURE — U0003 INFECTIOUS AGENT DETECTION BY NUCLEIC ACID (DNA OR RNA); SEVERE ACUTE RESPIRATORY SYNDROME CORONAVIRUS 2 (SARS-COV-2) (CORONAVIRUS DISEASE [COVID-19]), AMPLIFIED PROBE TECHNIQUE, MAKING USE OF HIGH THROUGHPUT TECHNOLOGIES AS DESCRIBED BY CMS-2020-01-R: HCPCS

## 2021-12-30 NOTE — TELEPHONE ENCOUNTER
"Telephone Visit:    Patient called requesting assistance with setting up Covid testing.  She had an evisit today for symptoms of \"runny nose\", chest congestion, and cough.    She was transferred to [facility] to schedule testing tomorrow in [place].    April Arrieta RN  12/29/21 6:13 PM  [facility] Nurse Advisor  "

## 2021-12-31 LAB — SARS-COV-2 RNA RESP QL NAA+PROBE: POSITIVE

## 2021-12-31 RX ORDER — ZOLPIDEM TARTRATE 5 MG/1
TABLET ORAL
Qty: 90 TABLET | Refills: 0 | Status: SHIPPED | OUTPATIENT
Start: 2021-12-31 | End: 2022-01-05

## 2021-12-31 NOTE — TELEPHONE ENCOUNTER
Please print and give to patient. If needs a signature from me, please come find me in clinic.    Dr. Pritchard

## 2021-12-31 NOTE — TELEPHONE ENCOUNTER
Pt going out of town the middle of January until the first week of April and requesting a paper RX of Zolpidem.  Pt last seen 11/10/21  Return in about 6 months (around 5/10/2022)

## 2022-01-04 DIAGNOSIS — I10 BENIGN ESSENTIAL HYPERTENSION: ICD-10-CM

## 2022-01-04 NOTE — TELEPHONE ENCOUNTER
Can someone ask the clinic manager/medical director for a written prescription pad with our clinic name on it (we only have [facility]) and then I can write it for patient.    Please let patient know we are working on it.    Dr. Pritchard

## 2022-01-04 NOTE — PROGRESS NOTES
Maria Elena is a 71 year old who is being evaluated via a billable telephone visit.      What phone number would you like to be contacted at? [phone]  How would you like to obtain your AVS? MyChart    Assessment & Plan     Primary insomnia  She did try the ramelteon a couple of times and it was not very effective.  He needs a written prescription for her Ambien so that she can fill in [place].  She will be there from mid January through March.  She will be due for her next refill in February.  I had been having difficulty with printing this prescription, however today using a different computer I was able to print the prescription.  The medical assistant will mail this to her.  - zolpidem (AMBIEN) 5 MG tablet; TAKE 1 TABLET BY MOUTH ONCE DAILY AT BEDTIME AS NEEDED FOR SLEEP    Infection due to 2019 novel coronavirus  She is 9 days into her symptoms.  She tested positive on December 30.  She is still within 10 days, and so I did discuss with her that she should call [facility] to see if she qualifies for the monoclonal antibody infusion.  She will do so and also encouraged her  to do so as well, as he is 4 days into the illness.  Symptomatically she is already improving.  Do not feel that she needs antibiotics at this time.  She will continue with her albuterol as needed and supportive measures.    Return in about 3 months (around 4/5/2022) for Follow up, with me.    Flor Correa MD  [facility]   Maria Elena is a 71 year old who presents for the following health issues     HPI       Concern for COVID-19  About how many days ago did these symptoms start? 9 days  Is this your first visit for this illness? Yes  In the 14 days before your symptoms started, have you had close contact with someone with COVID-19 (Coronavirus)? I do not know.  Do you have a fever or chills? No  Are you having new or worsening difficulty breathing? No  Do you have new or worsening cough? Yes, I am coughing  up mucus.  Have you had any new or unexplained body aches? No    Have you experienced any of the following NEW symptoms?    Headache: No    Sore throat: YES    Loss of taste or smell: No    Chest pain: No    Diarrhea: No    Rash: No  What treatments have you tried? Robitussin DM which helped and raul's vapor rub   Who do you live with?    Are you, or a household member, a healthcare worker or a ? No  Do you live in a nursing home, group home, or shelter? No  Do you have a way to get food/medications if quarantined? Yes, I have a friend or family member who can help me.    She had visitors for [place]. Started getting sick around 9 days ago. Was in the grocery store around [facility]. A woman sneezed around her. Tested positive on 12/30. Symptoms started on 12/28. She had slight fever the first day and chills for a couple of days, fever went away. She was very tired and didn't do much. Feeling better, but still having cough. Had a lot of chest congestion at first and ribs hurt when she was coughing, but now symptoms are not too bad anymore. Just a bit of a sore throat now. Taking robitussin to help loosen up. Still feeling a bit tired. She was using albuterol inhaler initially when she was wheezing.    She decided not to call [facility] for monoclonal antibody infusion because she thought she might not be eligible.     is symptomatic 4 days into illness as well.    Leaving January 20th through March. Will be in [place]. Needs written ambien prescription. Ramelteon didn't work but she'll try again in [place]      Review of Systems   See above      Objective           Vitals:  No vitals were obtained today due to virtual visit.    Physical Exam   healthy, alert and no distress  PSYCH: Alert and oriented times 3; coherent speech, normal   rate and volume, able to articulate logical thoughts, able   to abstract reason, no tangential thoughts, no hallucinations   or delusions  Her affect is  normal  RESP: No cough, no audible wheezing, able to talk in full sentences  Remainder of exam unable to be completed due to telephone visits                Phone call duration: 14 minutes

## 2022-01-05 ENCOUNTER — VIRTUAL VISIT (OUTPATIENT)
Dept: INTERNAL MEDICINE | Facility: CLINIC | Age: 72
End: 2022-01-05
Payer: COMMERCIAL

## 2022-01-05 DIAGNOSIS — U07.1 INFECTION DUE TO 2019 NOVEL CORONAVIRUS: Primary | ICD-10-CM

## 2022-01-05 DIAGNOSIS — F51.01 PRIMARY INSOMNIA: ICD-10-CM

## 2022-01-05 PROCEDURE — 99213 OFFICE O/P EST LOW 20 MIN: CPT | Mod: TEL | Performed by: PEDIATRICS

## 2022-01-05 RX ORDER — ZOLPIDEM TARTRATE 5 MG/1
TABLET ORAL
Qty: 90 TABLET | Refills: 0 | Status: SHIPPED | OUTPATIENT
Start: 2022-01-05 | End: 2022-01-05

## 2022-01-05 RX ORDER — ZOLPIDEM TARTRATE 5 MG/1
TABLET ORAL
Qty: 90 TABLET | Refills: 0 | Status: SHIPPED | OUTPATIENT
Start: 2022-02-07 | End: 2022-04-11

## 2022-01-05 NOTE — PATIENT INSTRUCTIONS
Call [facility] to see if you and your  still qualify for the monoclonal antibody infusion.     If you are getting worse, please follow up. Otherwise continue with supportive cares.    ---------------------------------------------------------------------------------    Monoclonal antibody infusion via the [employer] (MNRAP) system:     [email or web address] - providers, patients or someone helping may complete this screening tool. [facility] offers a lottery process to refer patients for COVID-19 monoclonal antibody (mAb) treatment, including post-exposure prophylaxis. For patients unable to fill out a form online, they may call the [facility] COVID-19 Public Hotline [phone] (open Mon-Fri: 9AM - 7PM and Sat: 10AM - 6PM).     Referrals are provisional, and final clinical judgment remains within the infusion site to determine eligibility and scheduling. When demand for mAb treatments increases, patients who are most at risk of severe illness will be prioritized.      Eligible patients are 12 or older, weigh at least 88 pounds, and are at high risk for severe COVID-19 (see [facility] website for risk categories).     For treatment of mild to moderate symptoms of COVID-19, patients must:    Test positive for SARS-CoV-2    Be within 10 days of the start of their symptoms    Not be hospitalized     For prophylaxis, eligible patients:    Have confirmed exposure OR are at high risk of being in close contact with someone with COVID-19 (e.g. nursing home resident)    Not fully vaccinated against COVID-19 OR are not expected to build up enough of an immune response to the complete COVID-19 vaccination

## 2022-01-06 NOTE — TELEPHONE ENCOUNTER
"Routing refill request to provider for review/approval because:  Early refill requested.    Last Written Prescription Date:  11/10/21  Last Fill Quantity: 90,  # refills: 0   Last office visit provider:  1/5/22     Requested Prescriptions   Pending Prescriptions Disp Refills     triamterene-HCTZ (DYAZIDE) 37.5-25 MG capsule [Pharmacy Med Name: Triamterene-HCTZ 37.5-25 MG Oral Capsule] 90 capsule 0     Sig: Take 1 capsule by mouth once daily       Diuretics (Including Combos) Protocol Passed - 1/4/2022  6:15 PM        Passed - Blood pressure under 140/90 in past 12 months     BP Readings from Last 3 Encounters:   11/10/21 128/70   06/21/21 (!) 155/77   11/23/20 138/80                 Passed - Recent (12 mo) or future (30 days) visit within the authorizing provider's specialty     Patient has had an office visit with the authorizing provider or a provider within the authorizing providers department within the previous 12 mos or has a future within next 30 days. See \"Patient Info\" tab in inbasket, or \"Choose Columns\" in Meds & Orders section of the refill encounter.              Passed - Medication is active on med list        Passed - Patient is age 18 or older        Passed - No active pregancy on record        Passed - Normal serum creatinine on file in past 12 months     Recent Labs   Lab Test 11/10/21  1257   CR 0.66              Passed - Normal serum potassium on file in past 12 months     Recent Labs   Lab Test 11/10/21  1257   POTASSIUM 4.1                    Passed - Normal serum sodium on file in past 12 months     Recent Labs   Lab Test 11/10/21  1257                 Passed - No positive pregnancy test in past 12 months             Clement Molina RN 01/06/22 10:57 AM  "

## 2022-01-10 RX ORDER — TRIAMTERENE AND HYDROCHLOROTHIAZIDE 37.5; 25 MG/1; MG/1
CAPSULE ORAL
Qty: 90 CAPSULE | Refills: 0 | Status: SHIPPED | OUTPATIENT
Start: 2022-01-10 | End: 2022-02-09

## 2022-02-07 DIAGNOSIS — F51.01 PRIMARY INSOMNIA: ICD-10-CM

## 2022-02-07 DIAGNOSIS — I63.311 CEREBRAL INFARCTION DUE TO THROMBOSIS OF RIGHT MIDDLE CEREBRAL ARTERY (H): ICD-10-CM

## 2022-02-07 DIAGNOSIS — I10 BENIGN ESSENTIAL HYPERTENSION: ICD-10-CM

## 2022-02-07 DIAGNOSIS — R06.2 WHEEZING: ICD-10-CM

## 2022-02-07 NOTE — TELEPHONE ENCOUNTER
Called patient to  script Reason for Call:  Medication refill:    Do you use a [facility]? No  Name of the pharmacy and phone number for the current request:  [facility]   [facility]  [address] [place], [place] [id number] [phone]    Name of the medication requested:   Rosuvastatin (Crestor) 20 mg    Other request: Requesting refills  Pt states she saw Dr. Correa in November and all her medications were supposed to have refills available while she is out of state.  Pt is asking care team to look at her medications to make sure she has refills available.     Can we leave a detailed message on this number? YES    Phone number patient can be reached at: Cell number on file:    No relevant phone numbers on file.       Best Time: Any    Call taken on 2/7/2022 at 12:08 PM by Joi Ray

## 2022-02-09 RX ORDER — TRIAMTERENE AND HYDROCHLOROTHIAZIDE 37.5; 25 MG/1; MG/1
CAPSULE ORAL
Qty: 90 CAPSULE | Refills: 0 | Status: SHIPPED | OUTPATIENT
Start: 2022-02-09 | End: 2022-04-05

## 2022-02-09 RX ORDER — FLUTICASONE PROPIONATE 50 MCG
1 SPRAY, SUSPENSION (ML) NASAL DAILY
Qty: 16 G | Refills: 1 | Status: SHIPPED | OUTPATIENT
Start: 2022-02-09

## 2022-02-09 RX ORDER — RAMELTEON 8 MG/1
8 TABLET ORAL AT BEDTIME
Qty: 90 TABLET | Refills: 0 | Status: SHIPPED | OUTPATIENT
Start: 2022-02-09

## 2022-02-09 RX ORDER — ROSUVASTATIN CALCIUM 20 MG/1
20 TABLET, COATED ORAL DAILY
Qty: 90 TABLET | Refills: 0 | Status: SHIPPED | OUTPATIENT
Start: 2022-02-09 | End: 2022-05-08

## 2022-02-09 RX ORDER — ALBUTEROL SULFATE 90 UG/1
2 AEROSOL, METERED RESPIRATORY (INHALATION) EVERY 6 HOURS PRN
Qty: 18 G | Refills: 1 | Status: SHIPPED | OUTPATIENT
Start: 2022-02-09

## 2022-03-27 ENCOUNTER — HEALTH MAINTENANCE LETTER (OUTPATIENT)
Age: 72
End: 2022-03-27

## 2022-04-02 DIAGNOSIS — I10 BENIGN ESSENTIAL HYPERTENSION: ICD-10-CM

## 2022-04-04 NOTE — TELEPHONE ENCOUNTER
"Routing refill request to provider for review/approval because:  Early refill requested.    Last Written Prescription Date:  2/9/22  Last Fill Quantity: 90,  # refills: 0   Last office visit provider:  1/5/22     Requested Prescriptions   Pending Prescriptions Disp Refills     triamterene-HCTZ (DYAZIDE) 37.5-25 MG capsule [Pharmacy Med Name: Triamterene-HCTZ 37.5-25 MG Oral Capsule] 90 capsule 0     Sig: Take 1 capsule by mouth once daily       Diuretics (Including Combos) Protocol Passed - 4/4/2022 12:57 PM        Passed - Blood pressure under 140/90 in past 12 months     BP Readings from Last 3 Encounters:   11/10/21 128/70   06/21/21 (!) 155/77   11/23/20 138/80                 Passed - Recent (12 mo) or future (30 days) visit within the authorizing provider's specialty     Patient has had an office visit with the authorizing provider or a provider within the authorizing providers department within the previous 12 mos or has a future within next 30 days. See \"Patient Info\" tab in inbasket, or \"Choose Columns\" in Meds & Orders section of the refill encounter.              Passed - Medication is active on med list        Passed - Patient is age 18 or older        Passed - No active pregancy on record        Passed - Normal serum creatinine on file in past 12 months     Recent Labs   Lab Test 11/10/21  1257   CR 0.66              Passed - Normal serum potassium on file in past 12 months     Recent Labs   Lab Test 11/10/21  1257   POTASSIUM 4.1                    Passed - Normal serum sodium on file in past 12 months     Recent Labs   Lab Test 11/10/21  1257                 Passed - No positive pregnancy test in past 12 months             Clement Molina RN 04/04/22 12:58 PM  "

## 2022-04-05 ENCOUNTER — MYC MEDICAL ADVICE (OUTPATIENT)
Dept: INTERNAL MEDICINE | Facility: CLINIC | Age: 72
End: 2022-04-05
Payer: COMMERCIAL

## 2022-04-05 DIAGNOSIS — F51.01 PRIMARY INSOMNIA: ICD-10-CM

## 2022-04-05 RX ORDER — TRIAMTERENE AND HYDROCHLOROTHIAZIDE 37.5; 25 MG/1; MG/1
CAPSULE ORAL
Qty: 90 CAPSULE | Refills: 0 | Status: SHIPPED | OUTPATIENT
Start: 2022-04-05 | End: 2022-06-16

## 2022-04-06 NOTE — TELEPHONE ENCOUNTER
Elva ms. We talked about a shingles shot at my last visit.  I had shingles in August and was wondering how long should I wait to get the shot.  Also, with whom  would I make the appointment?    2. I am going to need a zolpidem refill at the end of this month or beginning of next.  Could you send a refill for the date that I can refill. I will be low by then and [place]      How long is it typically okay for pts to get Shingrix after shingles dx?     Medication is josé miguel'd up for verification and approval, if appropriate. Refill date has not been change since writer was not sure how to do so.     Please advise    Kaushik Borges Jr., HAN on 2022 at 1:59 PM

## 2022-04-11 ENCOUNTER — ANCILLARY PROCEDURE (OUTPATIENT)
Dept: MAMMOGRAPHY | Facility: CLINIC | Age: 72
End: 2022-04-11
Attending: PEDIATRICS
Payer: COMMERCIAL

## 2022-04-11 DIAGNOSIS — I10 BENIGN ESSENTIAL HYPERTENSION: ICD-10-CM

## 2022-04-11 DIAGNOSIS — Z12.31 ENCOUNTER FOR SCREENING MAMMOGRAM FOR MALIGNANT NEOPLASM OF BREAST: ICD-10-CM

## 2022-04-11 PROCEDURE — 77067 SCR MAMMO BI INCL CAD: CPT

## 2022-04-11 RX ORDER — ZOLPIDEM TARTRATE 5 MG/1
TABLET ORAL
Qty: 90 TABLET | Refills: 0 | Status: SHIPPED | OUTPATIENT
Start: 2022-04-11 | End: 2022-07-10

## 2022-04-27 DIAGNOSIS — M54.16 LUMBAR RADICULOPATHY: Chronic | ICD-10-CM

## 2022-04-27 RX ORDER — BACLOFEN 10 MG/1
10 TABLET ORAL AT BEDTIME
Qty: 90 TABLET | Refills: 2 | Status: SHIPPED | OUTPATIENT
Start: 2022-04-27 | End: 2022-08-01

## 2022-04-27 NOTE — TELEPHONE ENCOUNTER
Refill request for Baclofen. Pt last seen 6/21/21 and has follow up scheduled for 11/8/22. Will send in refills.     Nimco Moses RN on 4/27/2022 at 11:56 AM

## 2022-05-05 DIAGNOSIS — I63.311 CEREBRAL INFARCTION DUE TO THROMBOSIS OF RIGHT MIDDLE CEREBRAL ARTERY (H): ICD-10-CM

## 2022-05-08 RX ORDER — ROSUVASTATIN CALCIUM 20 MG/1
TABLET, COATED ORAL
Qty: 90 TABLET | Refills: 1 | Status: SHIPPED | OUTPATIENT
Start: 2022-05-08

## 2022-05-09 NOTE — TELEPHONE ENCOUNTER
"Last Written Prescription Date:  2/9/22  Last Fill Quantity: 90,  # refills: 0   Last office visit provider:  11/10/21     Requested Prescriptions   Pending Prescriptions Disp Refills     rosuvastatin (CRESTOR) 20 MG tablet [Pharmacy Med Name: Rosuvastatin Calcium 20 MG Oral Tablet] 90 tablet 0     Sig: Take 1 tablet by mouth once daily       Statins Protocol Passed - 5/5/2022  4:02 PM        Passed - LDL on file in past 12 months     Recent Labs   Lab Test 11/10/21  1257   LDL 73             Passed - No abnormal creatine kinase in past 12 months     No lab results found.             Passed - Recent (12 mo) or future (30 days) visit within the authorizing provider's specialty     Patient has had an office visit with the authorizing provider or a provider within the authorizing providers department within the previous 12 mos or has a future within next 30 days. See \"Patient Info\" tab in inbasket, or \"Choose Columns\" in Meds & Orders section of the refill encounter.              Passed - Medication is active on med list        Passed - Patient is age 18 or older        Passed - No active pregnancy on record        Passed - No positive pregnancy test in past 12 months             Myrna Cheek 05/08/22 8:45 PM    " Abdomen soft, nontender, nondistended, bowel sounds present in all 4 quadrants.

## 2022-05-23 ENCOUNTER — HOSPITAL ENCOUNTER (OUTPATIENT)
Dept: ULTRASOUND IMAGING | Facility: HOSPITAL | Age: 72
Discharge: HOME | End: 2022-05-23
Attending: OBSTETRICS & GYNECOLOGY | Admitting: OBSTETRICS & GYNECOLOGY
Payer: COMMERCIAL

## 2022-05-23 DIAGNOSIS — R14.0 ABDOMINAL DISTENTION: ICD-10-CM

## 2022-05-23 PROCEDURE — 76830 TRANSVAGINAL US NON-OB: CPT

## 2022-06-16 ENCOUNTER — MYC REFILL (OUTPATIENT)
Dept: INTERNAL MEDICINE | Facility: CLINIC | Age: 72
End: 2022-06-16
Payer: COMMERCIAL

## 2022-06-16 DIAGNOSIS — F51.01 PRIMARY INSOMNIA: ICD-10-CM

## 2022-06-16 DIAGNOSIS — I10 BENIGN ESSENTIAL HYPERTENSION: ICD-10-CM

## 2022-06-17 RX ORDER — ZOLPIDEM TARTRATE 5 MG/1
TABLET ORAL
Qty: 90 TABLET | Refills: 0 | OUTPATIENT
Start: 2022-06-17

## 2022-06-17 RX ORDER — TRIAMTERENE AND HYDROCHLOROTHIAZIDE 37.5; 25 MG/1; MG/1
CAPSULE ORAL
Qty: 90 CAPSULE | Refills: 0 | Status: SHIPPED | OUTPATIENT
Start: 2022-06-17 | End: 2022-08-04

## 2022-06-17 NOTE — TELEPHONE ENCOUNTER
"Routing refill request to provider for review/approval because:  Controlled substance request    Last Written Prescription Date:  4/11/22  Last Fill Quantity: 90,  # refills: 0   Last office visit provider:  1/5/22     Requested Prescriptions   Pending Prescriptions Disp Refills     triamterene-HCTZ (DYAZIDE) 37.5-25 MG capsule 90 capsule 0       Diuretics (Including Combos) Protocol Passed - 6/17/2022  7:20 AM        Passed - Blood pressure under 140/90 in past 12 months     BP Readings from Last 3 Encounters:   11/10/21 128/70   06/21/21 (!) 155/77   11/23/20 138/80                 Passed - Recent (12 mo) or future (30 days) visit within the authorizing provider's specialty     Patient has had an office visit with the authorizing provider or a provider within the authorizing providers department within the previous 12 mos or has a future within next 30 days. See \"Patient Info\" tab in inbasket, or \"Choose Columns\" in Meds & Orders section of the refill encounter.              Passed - Medication is active on med list        Passed - Patient is age 18 or older        Passed - No active pregancy on record        Passed - Normal serum creatinine on file in past 12 months     Recent Labs   Lab Test 11/10/21  1257   CR 0.66              Passed - Normal serum potassium on file in past 12 months     Recent Labs   Lab Test 11/10/21  1257   POTASSIUM 4.1                    Passed - Normal serum sodium on file in past 12 months     Recent Labs   Lab Test 11/10/21  1257                 Passed - No positive pregnancy test in past 12 months           zolpidem (AMBIEN) 5 MG tablet 90 tablet 0     Sig: TAKE 1 TABLET BY MOUTH ONCE DAILY AT BEDTIME AS NEEDED FOR SLEEP       There is no refill protocol information for this order          Clement Molina RN 06/17/22 7:21 AM  "

## 2022-06-17 NOTE — TELEPHONE ENCOUNTER
"Routing refill request to provider for review/approval because:  Early refill requested.    Last Written Prescription Date:  4/5/22  Last Fill Quantity: 90,  # refills: 0   Last office visit provider:  1/5/22     Requested Prescriptions   Pending Prescriptions Disp Refills     triamterene-HCTZ (DYAZIDE) 37.5-25 MG capsule 90 capsule 0       Diuretics (Including Combos) Protocol Passed - 6/17/2022  7:20 AM        Passed - Blood pressure under 140/90 in past 12 months     BP Readings from Last 3 Encounters:   11/10/21 128/70   06/21/21 (!) 155/77   11/23/20 138/80                 Passed - Recent (12 mo) or future (30 days) visit within the authorizing provider's specialty     Patient has had an office visit with the authorizing provider or a provider within the authorizing providers department within the previous 12 mos or has a future within next 30 days. See \"Patient Info\" tab in inbasket, or \"Choose Columns\" in Meds & Orders section of the refill encounter.              Passed - Medication is active on med list        Passed - Patient is age 18 or older        Passed - No active pregancy on record        Passed - Normal serum creatinine on file in past 12 months     Recent Labs   Lab Test 11/10/21  1257   CR 0.66              Passed - Normal serum potassium on file in past 12 months     Recent Labs   Lab Test 11/10/21  1257   POTASSIUM 4.1                    Passed - Normal serum sodium on file in past 12 months     Recent Labs   Lab Test 11/10/21  1257                 Passed - No positive pregnancy test in past 12 months           zolpidem (AMBIEN) 5 MG tablet 90 tablet 0     Sig: TAKE 1 TABLET BY MOUTH ONCE DAILY AT BEDTIME AS NEEDED FOR SLEEP       There is no refill protocol information for this order          Clement Molina RN 06/17/22 7:20 AM  "

## 2022-07-10 ENCOUNTER — MYC REFILL (OUTPATIENT)
Dept: INTERNAL MEDICINE | Facility: CLINIC | Age: 72
End: 2022-07-10

## 2022-07-10 DIAGNOSIS — F51.01 PRIMARY INSOMNIA: ICD-10-CM

## 2022-07-10 RX ORDER — ZOLPIDEM TARTRATE 5 MG/1
TABLET ORAL
Qty: 90 TABLET | Refills: 0 | Status: SHIPPED | OUTPATIENT
Start: 2022-07-10

## 2022-08-01 DIAGNOSIS — M54.16 LUMBAR RADICULOPATHY: Chronic | ICD-10-CM

## 2022-08-01 RX ORDER — BACLOFEN 10 MG/1
TABLET ORAL
Qty: 180 TABLET | Refills: 1 | Status: SHIPPED | OUTPATIENT
Start: 2022-08-01 | End: 2022-12-13

## 2022-08-01 NOTE — TELEPHONE ENCOUNTER
Refill request for baclofen.  Pt has upcoming appt on 11/8/22.  Medication T'd for review and signature      Annette Watts LPN on 8/1/2022 at 12:49 PM

## 2022-08-04 DIAGNOSIS — I10 BENIGN ESSENTIAL HYPERTENSION: ICD-10-CM

## 2022-08-04 RX ORDER — TRIAMTERENE AND HYDROCHLOROTHIAZIDE 37.5; 25 MG/1; MG/1
1 CAPSULE ORAL DAILY
Qty: 90 CAPSULE | Refills: 0 | Status: SHIPPED | OUTPATIENT
Start: 2022-08-04

## 2022-09-24 ENCOUNTER — HEALTH MAINTENANCE LETTER (OUTPATIENT)
Age: 72
End: 2022-09-24

## 2022-10-13 ENCOUNTER — TRANSFERRED RECORDS (OUTPATIENT)
Dept: HEALTH INFORMATION MANAGEMENT | Facility: CLINIC | Age: 72
End: 2022-10-13

## 2022-12-01 ENCOUNTER — TELEPHONE (OUTPATIENT)
Dept: PODIATRY | Facility: CLINIC | Age: 72
End: 2022-12-01

## 2022-12-01 NOTE — TELEPHONE ENCOUNTER
Writer spoke with pt, she had x-ray done due to bruising of her foot. X-ray results in care everywhere. Pt offered to schedule with Dr. Wayne to discuss results. She is scheduled on 12/13/22.

## 2022-12-01 NOTE — TELEPHONE ENCOUNTER
Patient has had 2 surgeries with Dr. Wayne in the past and was last seen in 2018. She is now will the [facility] and had XR recently that showed one of the devices that was placed by Dr. Wayne is out of place. Writer offered to schedule an appointment to follow up with Dr. Wayne, but patient refused and asked for a call back from a nurse instead; she would like to ask how critical it is that she be seen by someone and if Dr. Wayne can refer her to someone in the [facility] system. [phone]

## 2022-12-13 ENCOUNTER — OFFICE VISIT (OUTPATIENT)
Dept: PODIATRY | Facility: CLINIC | Age: 72
End: 2022-12-13
Payer: COMMERCIAL

## 2022-12-13 ENCOUNTER — HOSPITAL ENCOUNTER (OUTPATIENT)
Dept: GENERAL RADIOLOGY | Facility: HOSPITAL | Age: 72
Discharge: HOME | End: 2022-12-13
Attending: PODIATRIST | Admitting: PODIATRIST
Payer: COMMERCIAL

## 2022-12-13 VITALS — HEART RATE: 88 BPM | WEIGHT: 193 LBS | HEIGHT: 64 IN | OXYGEN SATURATION: 99 % | BODY MASS INDEX: 32.95 KG/M2

## 2022-12-13 DIAGNOSIS — M79.672 LEFT FOOT PAIN: Primary | ICD-10-CM

## 2022-12-13 DIAGNOSIS — M79.672 LEFT FOOT PAIN: ICD-10-CM

## 2022-12-13 DIAGNOSIS — M20.5X2 HALLUX LIMITUS, LEFT: ICD-10-CM

## 2022-12-13 PROCEDURE — 73630 X-RAY EXAM OF FOOT: CPT | Mod: 26 | Performed by: PODIATRIST

## 2022-12-13 PROCEDURE — 73630 X-RAY EXAM OF FOOT: CPT | Mod: LT

## 2022-12-13 PROCEDURE — 99203 OFFICE O/P NEW LOW 30 MIN: CPT | Performed by: PODIATRIST

## 2022-12-13 RX ORDER — BACLOFEN 5 MG/1
TABLET ORAL
COMMUNITY
Start: 2022-11-07

## 2022-12-13 RX ORDER — GABAPENTIN 400 MG/1
400 CAPSULE ORAL 3 TIMES DAILY
COMMUNITY
Start: 2022-11-07

## 2022-12-13 ASSESSMENT — PAIN SCALES - GENERAL: PAINLEVEL: MILD PAIN (2)

## 2022-12-13 NOTE — LETTER
12/13/2022         RE: Maria Elena Sprague  [address] [facility] [address]        Dear Colleague,    Thank you for referring your patient, Maria Elena Sprague, to the [facility]. Please see a copy of my visit note below.    FOOT AND ANKLE SURGERY/PODIATRY CONSULT NOTE         ASSESSMENT:   Hallux limitus left foot      TREATMENT:  X-rays of the left foot were taken today.  The x-rays were read and interpreted by this physician.   I informed the patient that the implant is well-seated.  The first metatarsal phalangeal joint is anatomically aligned.  I do not recommend any additional treatment at this time.  If she develops any significant pain or swelling I would recommend the patient return to clinic for follow-up visit.        HPI: I was asked to see Maria Elena Sprague today for follow-up evaluation of her left foot.  The patient is several years status post first metatarsal phalangeal joint implant arthroplasty of the left foot.  The patient stated that she has not had any significant changes except she did have some bruising around the first metatarsal phalangeal joint several weeks ago.  She stated the bruising has been resolved.  She is able to wear shoes without discomfort.  She denies any recent trauma to her left foot.  She has not had any redness or swelling.  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     Past Medical History:   Diagnosis Date     Arthritis 2000?    neck and lower back     Hyperlipidemia      Hypertension      Obesity      Osteopenia      Sleep apnea     uses CPAP     Stroke (H) 2013    balance issue, pretty much resolved       Social History     Socioeconomic History     Marital status:      Spouse name: Not on file     Number of children: Not on file     Years of education: Not on file     Highest education level: Not on file   Occupational History     Not on file    Tobacco Use     Smoking status: Former     Packs/day: 0.50     Years: 4.00     Pack years: 2.00     Types: Cigarettes     Start date: 1969     Quit date: 1973     Years since quittin.5     Smokeless tobacco: Never     Tobacco comments:     quit about 50 years ago   Substance and Sexual Activity     Alcohol use: Yes     Comment: about 2X a week     Drug use: Never     Sexual activity: Yes     Partners: Male     Birth control/protection: Male Surgical     Comment:    Other Topics Concern     Parent/sibling w/ CABG, MI or angioplasty before 65F 55M? No   Social History Narrative     Not on file     Social Determinants of Health     Financial Resource Strain: Low Risk      Difficulty of Paying Living Expenses: Not hard at all   Food Insecurity: No Food Insecurity     Worried About Running Out of Food in the Last Year: Never true     Ran Out of Food in the Last Year: Never true   Transportation Needs: No Transportation Needs     Lack of Transportation (Medical): No     Lack of Transportation (Non-Medical): No   Physical Activity: Not on file   Stress: Not on file   Social Connections: Not on file   Intimate Partner Violence: Not on file   Housing Stability: Not on file          Allergies   Allergen Reactions     Oxaprozin Anaphylaxis and Swelling     Hands and feet, itching (daypro), trouble breathing  Anaphylaxis reaction to Daypro.       Hydrocodone Itching     Per pt she had reaction when she took this meds the second time.       Flu Virus Vaccine Other (See Comments)     Unknown reaction. Was told by provider in the past not to take flu shot     Itraconazole Hives and Itching          Current Outpatient Medications:      albuterol (PROAIR HFA/PROVENTIL HFA/VENTOLIN HFA) 108 (90 Base) MCG/ACT inhaler, Inhale 2 puffs into the lungs every 6 hours as needed for shortness of breath / dyspnea or wheezing, Disp: 18 g, Rfl: 1     aspirin (ASA) 325 MG EC tablet, Take 325 mg by mouth daily, Disp: , Rfl:       baclofen (LIORESAL) 10 MG tablet, Take 1 tablet by mouth twice daily, Disp: 180 tablet, Rfl: 1     fluticasone (FLONASE) 50 MCG/ACT nasal spray, Spray 1 spray into both nostrils daily, Disp: 16 g, Rfl: 1     gabapentin (NEURONTIN) 300 MG capsule, Take 1 capsule (300 mg) by mouth 4 times daily, Disp: 360 capsule, Rfl: 3     gabapentin (NEURONTIN) 400 MG capsule, Take 400 mg by mouth 3 times daily, Disp: , Rfl:      magnesium oxide 200 MG TABS, Take 400 mg by mouth, Disp: , Rfl:      melatonin 3 MG tablet, Take 3 mg by mouth, Disp: , Rfl:      Multiple Vitamins-Minerals (MULTIVITAMIN ADULT PO), , Disp: , Rfl:      ramelteon (ROZEREM) 8 MG tablet, Take 1 tablet (8 mg) by mouth At Bedtime, Disp: 90 tablet, Rfl: 0     rosuvastatin (CRESTOR) 20 MG tablet, Take 1 tablet by mouth once daily, Disp: 90 tablet, Rfl: 1     triamterene-HCTZ (DYAZIDE) 37.5-25 MG capsule, Take 1 capsule by mouth daily Take daily, Disp: 90 capsule, Rfl: 0     vitamin B-Complex, Take 1 tablet by mouth daily, Disp: , Rfl:      zolpidem (AMBIEN) 5 MG tablet, TAKE 1 TABLET BY MOUTH ONCE DAILY AT BEDTIME AS NEEDED FOR SLEEP, Disp: 90 tablet, Rfl: 0     Family History   Problem Relation Age of Onset     Ovarian Cancer Mother         late 70s     Cerebrovascular Disease Mother         Had 4 from clots     Other Cancer Mother         Ovarian @ 77yo     Osteoporosis Mother      Colon Cancer Father         mid 60s     Substance Abuse Father         when young     Breast Cancer Sister      Diabetes Sister         Type 2 @ 49yo     Obesity Sister      ALS Sister      Osteoporosis Sister      Genetic Disorder Sister         ALS @ 65-?genetic     Cerebrovascular Disease Sister      Breast Cancer Sister 65     Prostate Cancer Brother      Diabetes Brother         Type 2 @ 75 yo     Coronary Artery Disease Brother         At 74 yo     Obesity Brother      Prostate Cancer Brother 56     Colon Cancer Brother         73 yo-rectal     Colon Cancer Brother 72      Skin Cancer Maternal Grandmother      Colon Cancer Maternal Grandfather         83 yo     Stomach Cancer Paternal Grandfather         60s     Pancreatic Cancer Maternal Aunt 82     Lung Cancer Maternal Aunt         80s     Stomach Cancer Maternal Uncle         80s     Skin Cancer Maternal Uncle      Cancer Maternal Uncle         stomach and colon cancer in 60s     Cancer Maternal Uncle         pituitary tumor 50s     Breast Cancer Paternal Aunt         Age in 70's     Breast Cancer Paternal Aunt         Age in 70's     Cancer Paternal Uncle         melanoma and bladder in 80s     Bleeding Disorder Grandchild      Stomach Cancer Other 81        paternal-maternal great-aunt     Other Cancer Other         various cancers     Kidney Cancer Other         paternal side,  at 57     Colon Cancer Other 52        paternal side     Stomach Cancer Other         paternal side,  at 54     Leukemia Other         paternal side, late 50s     Breast Cancer Other         maternal first-cousin, late 40s     Lymphoma Other 74        paternal side, MALT     Lymphoma Other         paternal side,  at 50     Stomach Cancer Other 84        paternal side     Cancer Other         paternal side, breast and stomach cancer     Uterine Cancer Other 78        paternal side     Cancer Other 83        paternal side, prostate and bone cancer     Leukemia Other         paternal side, 80s     Lung Cancer Other         paternal side, 90s     Hypertension Sister      Cerebrovascular Disease Sister      Breast Cancer Sister         At 64 yo     Depression Sister      Prostate Cancer Brother         At 55 yo        Social History     Socioeconomic History     Marital status:      Spouse name: Not on file     Number of children: Not on file     Years of education: Not on file     Highest education level: Not on file   Occupational History     Not on file   Tobacco Use     Smoking status: Former     Packs/day: 0.50     Years: 4.00     Pack  "years: 2.00     Types: Cigarettes     Start date: 1969     Quit date: 1973     Years since quittin.5     Smokeless tobacco: Never     Tobacco comments:     quit about 50 years ago   Substance and Sexual Activity     Alcohol use: Yes     Comment: about 2X a week     Drug use: Never     Sexual activity: Yes     Partners: Male     Birth control/protection: Male Surgical     Comment:    Other Topics Concern     Parent/sibling w/ CABG, MI or angioplasty before 65F 55M? No   Social History Narrative     Not on file     Social Determinants of Health     Financial Resource Strain: Low Risk      Difficulty of Paying Living Expenses: Not hard at all   Food Insecurity: No Food Insecurity     Worried About Running Out of Food in the Last Year: Never true     Ran Out of Food in the Last Year: Never true   Transportation Needs: No Transportation Needs     Lack of Transportation (Medical): No     Lack of Transportation (Non-Medical): No   Physical Activity: Not on file   Stress: Not on file   Social Connections: Not on file   Intimate Partner Violence: Not on file   Housing Stability: Not on file        Review of Systems - Patient denies fever, chills, rash, wound, stiffness, limping, numbness, weakness, heart burn, blood in stool, chest pain with activity, calf pain when walking, shortness of breath with activity, chronic cough, easy bleeding/bruising, swelling of ankles, excessive thirst, fatigue, depression, anxiety.  Patient admits to improving discoloration first metatarsal phalangeal joint left foot.      OBJECTIVE:  Appearance: alert, well appearing, and in no distress.    Pulse 88   Ht 1.626 m (5' 4\")   Wt 87.5 kg (193 lb)   LMP  (LMP Unknown)   SpO2 99%   BMI 33.13 kg/m       Body mass index is 33.13 kg/m .     General appearance: Patient is alert and fully cooperative with history & exam.  No sign of distress is noted during the visit.  Psychiatric: Affect is pleasant & appropriate.  Patient " appears motivated to improve health.  Respiratory: Breathing is regular & unlabored while sitting.  HEENT: Hearing is intact to spoken word.  Speech is clear.  No gross evidence of visual impairment that would impact ambulation.    Vascular: Dorsalis pedis and posterior tibial pulses are palpable. There is no pedal hair growth bilaterally.  CFT < 3 sec from anterior tibial surface to distal digits bilaterally. There is no appreciable edema noted.  Dermatologic: Turgor and texture are within normal limits. No coloration or temperature changes. No primary or secondary lesions noted.  Neurologic: All epicritic and proprioceptive sensations are grossly intact bilaterally.  Musculoskeletal: All active and passive ankle, subtalar, midtarsal, and 1st MPJ range of motion are grossly intact without pain or crepitus, with the exception of none. Manual muscle strength is within normal limits bilaterally. All dorsiflexors, plantarflexors, invertors, evertors are intact bilaterally.  No tenderness present to left first metatarsal phalangeal joint on palpation.  No tenderness to the left first MPJ with range of motion. Calf is soft/non-tender without warmth/induration    Imaging:       No images are attached to the encounter or orders placed in the encounter.     No results found.   No results found.       Maverick Mustafa DPM  [facility] Foot & Ankle Surgery/Podiatry         Again, thank you for allowing me to participate in the care of your patient.        Sincerely,        Maverick Wayne DPM

## 2022-12-13 NOTE — PROGRESS NOTES
FOOT AND ANKLE SURGERY/PODIATRY CONSULT NOTE         ASSESSMENT:   Hallux limitus left foot      TREATMENT:  X-rays of the left foot were taken today.  The x-rays were read and interpreted by this physician.   I informed the patient that the implant is well-seated.  The first metatarsal phalangeal joint is anatomically aligned.  I do not recommend any additional treatment at this time.  If she develops any significant pain or swelling I would recommend the patient return to clinic for follow-up visit.        HPI: I was asked to see Maria Elena Sprague today for follow-up evaluation of her left foot.  The patient is several years status post first metatarsal phalangeal joint implant arthroplasty of the left foot.  The patient stated that she has not had any significant changes except she did have some bruising around the first metatarsal phalangeal joint several weeks ago.  She stated the bruising has been resolved.  She is able to wear shoes without discomfort.  She denies any recent trauma to her left foot.  She has not had any redness or swelling.  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     Past Medical History:   Diagnosis Date     Arthritis ?    neck and lower back     Hyperlipidemia      Hypertension      Obesity      Osteopenia      Sleep apnea     uses CPAP     Stroke (H) 2013    balance issue, pretty much resolved       Social History     Socioeconomic History     Marital status:      Spouse name: Not on file     Number of children: Not on file     Years of education: Not on file     Highest education level: Not on file   Occupational History     Not on file   Tobacco Use     Smoking status: Former     Packs/day: 0.50     Years: 4.00     Pack years: 2.00     Types: Cigarettes     Start date: 1969     Quit date: 1973     Years since quittin.5     Smokeless tobacco: Never     Tobacco  comments:     quit about 50 years ago   Substance and Sexual Activity     Alcohol use: Yes     Comment: about 2X a week     Drug use: Never     Sexual activity: Yes     Partners: Male     Birth control/protection: Male Surgical     Comment:    Other Topics Concern     Parent/sibling w/ CABG, MI or angioplasty before 65F 55M? No   Social History Narrative     Not on file     Social Determinants of Health     Financial Resource Strain: Low Risk      Difficulty of Paying Living Expenses: Not hard at all   Food Insecurity: No Food Insecurity     Worried About Running Out of Food in the Last Year: Never true     Ran Out of Food in the Last Year: Never true   Transportation Needs: No Transportation Needs     Lack of Transportation (Medical): No     Lack of Transportation (Non-Medical): No   Physical Activity: Not on file   Stress: Not on file   Social Connections: Not on file   Intimate Partner Violence: Not on file   Housing Stability: Not on file          Allergies   Allergen Reactions     Oxaprozin Anaphylaxis and Swelling     Hands and feet, itching (daypro), trouble breathing  Anaphylaxis reaction to Daypro.       Hydrocodone Itching     Per pt she had reaction when she took this meds the second time.       Flu Virus Vaccine Other (See Comments)     Unknown reaction. Was told by provider in the past not to take flu shot     Itraconazole Hives and Itching          Current Outpatient Medications:      albuterol (PROAIR HFA/PROVENTIL HFA/VENTOLIN HFA) 108 (90 Base) MCG/ACT inhaler, Inhale 2 puffs into the lungs every 6 hours as needed for shortness of breath / dyspnea or wheezing, Disp: 18 g, Rfl: 1     aspirin (ASA) 325 MG EC tablet, Take 325 mg by mouth daily, Disp: , Rfl:      baclofen (LIORESAL) 10 MG tablet, Take 1 tablet by mouth twice daily, Disp: 180 tablet, Rfl: 1     fluticasone (FLONASE) 50 MCG/ACT nasal spray, Spray 1 spray into both nostrils daily, Disp: 16 g, Rfl: 1     gabapentin (NEURONTIN) 300 MG  capsule, Take 1 capsule (300 mg) by mouth 4 times daily, Disp: 360 capsule, Rfl: 3     gabapentin (NEURONTIN) 400 MG capsule, Take 400 mg by mouth 3 times daily, Disp: , Rfl:      magnesium oxide 200 MG TABS, Take 400 mg by mouth, Disp: , Rfl:      melatonin 3 MG tablet, Take 3 mg by mouth, Disp: , Rfl:      Multiple Vitamins-Minerals (MULTIVITAMIN ADULT PO), , Disp: , Rfl:      ramelteon (ROZEREM) 8 MG tablet, Take 1 tablet (8 mg) by mouth At Bedtime, Disp: 90 tablet, Rfl: 0     rosuvastatin (CRESTOR) 20 MG tablet, Take 1 tablet by mouth once daily, Disp: 90 tablet, Rfl: 1     triamterene-HCTZ (DYAZIDE) 37.5-25 MG capsule, Take 1 capsule by mouth daily Take daily, Disp: 90 capsule, Rfl: 0     vitamin B-Complex, Take 1 tablet by mouth daily, Disp: , Rfl:      zolpidem (AMBIEN) 5 MG tablet, TAKE 1 TABLET BY MOUTH ONCE DAILY AT BEDTIME AS NEEDED FOR SLEEP, Disp: 90 tablet, Rfl: 0     Family History   Problem Relation Age of Onset     Ovarian Cancer Mother         late 70s     Cerebrovascular Disease Mother         Had 4 from clots     Other Cancer Mother         Ovarian @ 79yo     Osteoporosis Mother      Colon Cancer Father         mid 60s     Substance Abuse Father         when young     Breast Cancer Sister      Diabetes Sister         Type 2 @ 49yo     Obesity Sister      ALS Sister      Osteoporosis Sister      Genetic Disorder Sister         ALS @ 65-?genetic     Cerebrovascular Disease Sister      Breast Cancer Sister 65     Prostate Cancer Brother      Diabetes Brother         Type 2 @ 75 yo     Coronary Artery Disease Brother         At 74 yo     Obesity Brother      Prostate Cancer Brother 56     Colon Cancer Brother         71 yo-rectal     Colon Cancer Brother 72     Skin Cancer Maternal Grandmother      Colon Cancer Maternal Grandfather         83 yo     Stomach Cancer Paternal Grandfather         60s     Pancreatic Cancer Maternal Aunt 82     Lung Cancer Maternal Aunt         80s     Stomach Cancer  Maternal Uncle         80s     Skin Cancer Maternal Uncle      Cancer Maternal Uncle         stomach and colon cancer in 60s     Cancer Maternal Uncle         pituitary tumor 50s     Breast Cancer Paternal Aunt         Age in 70's     Breast Cancer Paternal Aunt         Age in 70's     Cancer Paternal Uncle         melanoma and bladder in 80s     Bleeding Disorder Grandchild      Stomach Cancer Other 81        paternal-maternal great-aunt     Other Cancer Other         various cancers     Kidney Cancer Other         paternal side,  at 57     Colon Cancer Other 52        paternal side     Stomach Cancer Other         paternal side,  at 54     Leukemia Other         paternal side, late 50s     Breast Cancer Other         maternal first-cousin, late 40s     Lymphoma Other 74        paternal side, MALT     Lymphoma Other         paternal side,  at 50     Stomach Cancer Other 84        paternal side     Cancer Other         paternal side, breast and stomach cancer     Uterine Cancer Other 78        paternal side     Cancer Other 83        paternal side, prostate and bone cancer     Leukemia Other         paternal side, 80s     Lung Cancer Other         paternal side, 90s     Hypertension Sister      Cerebrovascular Disease Sister      Breast Cancer Sister         At 64 yo     Depression Sister      Prostate Cancer Brother         At 57 yo        Social History     Socioeconomic History     Marital status:      Spouse name: Not on file     Number of children: Not on file     Years of education: Not on file     Highest education level: Not on file   Occupational History     Not on file   Tobacco Use     Smoking status: Former     Packs/day: 0.50     Years: 4.00     Pack years: 2.00     Types: Cigarettes     Start date: 1969     Quit date: 1973     Years since quittin.5     Smokeless tobacco: Never     Tobacco comments:     quit about 50 years ago   Substance and Sexual Activity     Alcohol  "use: Yes     Comment: about 2X a week     Drug use: Never     Sexual activity: Yes     Partners: Male     Birth control/protection: Male Surgical     Comment:    Other Topics Concern     Parent/sibling w/ CABG, MI or angioplasty before 65F 55M? No   Social History Narrative     Not on file     Social Determinants of Health     Financial Resource Strain: Low Risk      Difficulty of Paying Living Expenses: Not hard at all   Food Insecurity: No Food Insecurity     Worried About Running Out of Food in the Last Year: Never true     Ran Out of Food in the Last Year: Never true   Transportation Needs: No Transportation Needs     Lack of Transportation (Medical): No     Lack of Transportation (Non-Medical): No   Physical Activity: Not on file   Stress: Not on file   Social Connections: Not on file   Intimate Partner Violence: Not on file   Housing Stability: Not on file        Review of Systems - Patient denies fever, chills, rash, wound, stiffness, limping, numbness, weakness, heart burn, blood in stool, chest pain with activity, calf pain when walking, shortness of breath with activity, chronic cough, easy bleeding/bruising, swelling of ankles, excessive thirst, fatigue, depression, anxiety.  Patient admits to improving discoloration first metatarsal phalangeal joint left foot.      OBJECTIVE:  Appearance: alert, well appearing, and in no distress.    Pulse 88   Ht 1.626 m (5' 4\")   Wt 87.5 kg (193 lb)   LMP  (LMP Unknown)   SpO2 99%   BMI 33.13 kg/m       Body mass index is 33.13 kg/m .     General appearance: Patient is alert and fully cooperative with history & exam.  No sign of distress is noted during the visit.  Psychiatric: Affect is pleasant & appropriate.  Patient appears motivated to improve health.  Respiratory: Breathing is regular & unlabored while sitting.  HEENT: Hearing is intact to spoken word.  Speech is clear.  No gross evidence of visual impairment that would impact ambulation.    Vascular: " Dorsalis pedis and posterior tibial pulses are palpable. There is no pedal hair growth bilaterally.  CFT < 3 sec from anterior tibial surface to distal digits bilaterally. There is no appreciable edema noted.  Dermatologic: Turgor and texture are within normal limits. No coloration or temperature changes. No primary or secondary lesions noted.  Neurologic: All epicritic and proprioceptive sensations are grossly intact bilaterally.  Musculoskeletal: All active and passive ankle, subtalar, midtarsal, and 1st MPJ range of motion are grossly intact without pain or crepitus, with the exception of none. Manual muscle strength is within normal limits bilaterally. All dorsiflexors, plantarflexors, invertors, evertors are intact bilaterally.  No tenderness present to left first metatarsal phalangeal joint on palpation.  No tenderness to the left first MPJ with range of motion. Calf is soft/non-tender without warmth/induration    Imaging:       No images are attached to the encounter or orders placed in the encounter.     No results found.   No results found.       Maverick Mustafa DPM  [facility] Foot & Ankle Surgery/Podiatry

## 2023-08-05 ENCOUNTER — HEALTH MAINTENANCE LETTER (OUTPATIENT)
Age: 73
End: 2023-08-05

## 2024-03-02 ENCOUNTER — HEALTH MAINTENANCE LETTER (OUTPATIENT)
Age: 74
End: 2024-03-02

## 2024-09-22 ENCOUNTER — HEALTH MAINTENANCE LETTER (OUTPATIENT)
Age: 74
End: 2024-09-22

## 2025-03-15 ENCOUNTER — HEALTH MAINTENANCE LETTER (OUTPATIENT)
Age: 75
End: 2025-03-15